# Patient Record
Sex: MALE | Race: WHITE | NOT HISPANIC OR LATINO | Employment: OTHER | URBAN - METROPOLITAN AREA
[De-identification: names, ages, dates, MRNs, and addresses within clinical notes are randomized per-mention and may not be internally consistent; named-entity substitution may affect disease eponyms.]

---

## 2017-02-09 DIAGNOSIS — E78.5 HYPERLIPIDEMIA ASSOCIATED WITH TYPE 2 DIABETES MELLITUS: ICD-10-CM

## 2017-02-09 DIAGNOSIS — E11.69 HYPERLIPIDEMIA ASSOCIATED WITH TYPE 2 DIABETES MELLITUS: ICD-10-CM

## 2017-02-09 RX ORDER — ATORVASTATIN CALCIUM 20 MG/1
TABLET, FILM COATED ORAL
Qty: 30 TABLET | Refills: 0 | Status: SHIPPED | OUTPATIENT
Start: 2017-02-09 | End: 2017-03-10 | Stop reason: SDUPTHER

## 2017-02-10 NOTE — TELEPHONE ENCOUNTER
Spoke with patient explained he needs to come in to see Dr. Rey before the month supply is out verbal understanding noted

## 2017-03-10 DIAGNOSIS — E11.69 HYPERLIPIDEMIA ASSOCIATED WITH TYPE 2 DIABETES MELLITUS: ICD-10-CM

## 2017-03-10 DIAGNOSIS — E78.5 HYPERLIPIDEMIA ASSOCIATED WITH TYPE 2 DIABETES MELLITUS: ICD-10-CM

## 2017-03-10 RX ORDER — ATORVASTATIN CALCIUM 20 MG/1
TABLET, FILM COATED ORAL
Qty: 30 TABLET | Refills: 2 | Status: SHIPPED | OUTPATIENT
Start: 2017-03-10 | End: 2017-07-14 | Stop reason: SDUPTHER

## 2017-03-27 DIAGNOSIS — L03.119 TYPE 2 DIABETES, CONTROLLED, WITH CELLULITIS OF FOOT: ICD-10-CM

## 2017-03-27 DIAGNOSIS — E11.628 TYPE 2 DIABETES, CONTROLLED, WITH CELLULITIS OF FOOT: ICD-10-CM

## 2017-03-27 RX ORDER — METFORMIN HYDROCHLORIDE 1000 MG/1
TABLET ORAL
Qty: 90 TABLET | Refills: 0 | Status: SHIPPED | OUTPATIENT
Start: 2017-03-27 | End: 2017-05-11 | Stop reason: SDUPTHER

## 2017-05-08 ENCOUNTER — APPOINTMENT (OUTPATIENT)
Dept: LAB | Facility: HOSPITAL | Age: 67
End: 2017-05-08
Attending: INTERNAL MEDICINE
Payer: MEDICARE

## 2017-05-08 ENCOUNTER — CLINICAL SUPPORT (OUTPATIENT)
Dept: FAMILY MEDICINE | Facility: CLINIC | Age: 67
End: 2017-05-08
Payer: MEDICARE

## 2017-05-08 DIAGNOSIS — E78.5 HYPERLIPIDEMIA ASSOCIATED WITH TYPE 2 DIABETES MELLITUS: ICD-10-CM

## 2017-05-08 DIAGNOSIS — E11.59 HYPERTENSION ASSOCIATED WITH DIABETES: ICD-10-CM

## 2017-05-08 DIAGNOSIS — E11.69 HYPERLIPIDEMIA ASSOCIATED WITH TYPE 2 DIABETES MELLITUS: ICD-10-CM

## 2017-05-08 DIAGNOSIS — I15.2 HYPERTENSION ASSOCIATED WITH DIABETES: ICD-10-CM

## 2017-05-08 DIAGNOSIS — E11.9 CONTROLLED TYPE 2 DIABETES MELLITUS WITHOUT COMPLICATION, WITHOUT LONG-TERM CURRENT USE OF INSULIN: ICD-10-CM

## 2017-05-08 LAB
ALBUMIN SERPL BCP-MCNC: 4.3 G/DL
ALP SERPL-CCNC: 43 U/L
ALT SERPL W/O P-5'-P-CCNC: 16 U/L
ANION GAP SERPL CALC-SCNC: 9 MMOL/L
AST SERPL-CCNC: 16 U/L
BASOPHILS # BLD AUTO: 0 K/UL
BASOPHILS NFR BLD: 0.4 %
BILIRUB SERPL-MCNC: 0.4 MG/DL
BUN SERPL-MCNC: 17 MG/DL
CALCIUM SERPL-MCNC: 10.2 MG/DL
CHLORIDE SERPL-SCNC: 101 MMOL/L
CHOLEST/HDLC SERPL: 3 {RATIO}
CO2 SERPL-SCNC: 26 MMOL/L
CREAT SERPL-MCNC: 0.8 MG/DL
DIFFERENTIAL METHOD: NORMAL
EOSINOPHIL # BLD AUTO: 0.2 K/UL
EOSINOPHIL NFR BLD: 2.3 %
ERYTHROCYTE [DISTWIDTH] IN BLOOD BY AUTOMATED COUNT: 13.7 %
EST. GFR  (AFRICAN AMERICAN): >60 ML/MIN/1.73 M^2
EST. GFR  (NON AFRICAN AMERICAN): >60 ML/MIN/1.73 M^2
GLUCOSE SERPL-MCNC: 97 MG/DL
HCT VFR BLD AUTO: 47.9 %
HDL/CHOLESTEROL RATIO: 33.1 %
HDLC SERPL-MCNC: 121 MG/DL
HDLC SERPL-MCNC: 40 MG/DL
HGB BLD-MCNC: 16 G/DL
LDLC SERPL CALC-MCNC: 59.6 MG/DL
LYMPHOCYTES # BLD AUTO: 1.8 K/UL
LYMPHOCYTES NFR BLD: 22.3 %
MCH RBC QN AUTO: 29.3 PG
MCHC RBC AUTO-ENTMCNC: 33.4 %
MCV RBC AUTO: 88 FL
MONOCYTES # BLD AUTO: 0.7 K/UL
MONOCYTES NFR BLD: 8.2 %
NEUTROPHILS # BLD AUTO: 5.4 K/UL
NEUTROPHILS NFR BLD: 66.8 %
NONHDLC SERPL-MCNC: 81 MG/DL
PLATELET # BLD AUTO: 175 K/UL
PMV BLD AUTO: 9.4 FL
POTASSIUM SERPL-SCNC: 4.7 MMOL/L
PROT SERPL-MCNC: 7.4 G/DL
RBC # BLD AUTO: 5.46 M/UL
SODIUM SERPL-SCNC: 136 MMOL/L
TRIGL SERPL-MCNC: 107 MG/DL
WBC # BLD AUTO: 8 K/UL

## 2017-05-08 PROCEDURE — 80061 LIPID PANEL: CPT

## 2017-05-08 PROCEDURE — 83036 HEMOGLOBIN GLYCOSYLATED A1C: CPT

## 2017-05-08 PROCEDURE — 80053 COMPREHEN METABOLIC PANEL: CPT

## 2017-05-08 PROCEDURE — 85025 COMPLETE CBC W/AUTO DIFF WBC: CPT

## 2017-05-09 LAB
ESTIMATED AVG GLUCOSE: 120 MG/DL
HBA1C MFR BLD HPLC: 5.8 %

## 2017-05-11 DIAGNOSIS — E11.628 TYPE 2 DIABETES, CONTROLLED, WITH CELLULITIS OF FOOT: ICD-10-CM

## 2017-05-11 DIAGNOSIS — L03.119 TYPE 2 DIABETES, CONTROLLED, WITH CELLULITIS OF FOOT: ICD-10-CM

## 2017-05-11 RX ORDER — METFORMIN HYDROCHLORIDE 1000 MG/1
TABLET ORAL
Qty: 90 TABLET | Refills: 0 | Status: SHIPPED | OUTPATIENT
Start: 2017-05-11 | End: 2017-07-22 | Stop reason: SDUPTHER

## 2017-05-15 ENCOUNTER — DOCUMENTATION ONLY (OUTPATIENT)
Dept: FAMILY MEDICINE | Facility: CLINIC | Age: 67
End: 2017-05-15

## 2017-05-15 ENCOUNTER — OFFICE VISIT (OUTPATIENT)
Dept: FAMILY MEDICINE | Facility: CLINIC | Age: 67
End: 2017-05-15
Payer: MEDICARE

## 2017-05-15 VITALS
HEART RATE: 69 BPM | DIASTOLIC BLOOD PRESSURE: 74 MMHG | SYSTOLIC BLOOD PRESSURE: 131 MMHG | TEMPERATURE: 99 F | HEIGHT: 71 IN | BODY MASS INDEX: 27.06 KG/M2 | OXYGEN SATURATION: 96 % | WEIGHT: 193.31 LBS

## 2017-05-15 DIAGNOSIS — I15.2 HYPERTENSION ASSOCIATED WITH DIABETES: ICD-10-CM

## 2017-05-15 DIAGNOSIS — E78.5 HYPERLIPIDEMIA ASSOCIATED WITH TYPE 2 DIABETES MELLITUS: ICD-10-CM

## 2017-05-15 DIAGNOSIS — E11.69 HYPERLIPIDEMIA ASSOCIATED WITH TYPE 2 DIABETES MELLITUS: ICD-10-CM

## 2017-05-15 DIAGNOSIS — M54.2 NECK PAIN: ICD-10-CM

## 2017-05-15 DIAGNOSIS — Z72.0 TOBACCO ABUSE: ICD-10-CM

## 2017-05-15 DIAGNOSIS — E11.59 HYPERTENSION ASSOCIATED WITH DIABETES: ICD-10-CM

## 2017-05-15 DIAGNOSIS — E11.9 CONTROLLED TYPE 2 DIABETES MELLITUS WITHOUT COMPLICATION, WITHOUT LONG-TERM CURRENT USE OF INSULIN: Primary | ICD-10-CM

## 2017-05-15 PROCEDURE — 3078F DIAST BP <80 MM HG: CPT | Mod: S$GLB,,, | Performed by: INTERNAL MEDICINE

## 2017-05-15 PROCEDURE — 1159F MED LIST DOCD IN RCRD: CPT | Mod: S$GLB,,, | Performed by: INTERNAL MEDICINE

## 2017-05-15 PROCEDURE — 1125F AMNT PAIN NOTED PAIN PRSNT: CPT | Mod: S$GLB,,, | Performed by: INTERNAL MEDICINE

## 2017-05-15 PROCEDURE — 3075F SYST BP GE 130 - 139MM HG: CPT | Mod: S$GLB,,, | Performed by: INTERNAL MEDICINE

## 2017-05-15 PROCEDURE — 1160F RVW MEDS BY RX/DR IN RCRD: CPT | Mod: S$GLB,,, | Performed by: INTERNAL MEDICINE

## 2017-05-15 PROCEDURE — 3044F HG A1C LEVEL LT 7.0%: CPT | Mod: S$GLB,,, | Performed by: INTERNAL MEDICINE

## 2017-05-15 PROCEDURE — 99499 UNLISTED E&M SERVICE: CPT | Mod: S$GLB,,, | Performed by: INTERNAL MEDICINE

## 2017-05-15 PROCEDURE — 3060F POS MICROALBUMINURIA REV: CPT | Mod: 8P,S$GLB,, | Performed by: INTERNAL MEDICINE

## 2017-05-15 PROCEDURE — 99214 OFFICE O/P EST MOD 30 MIN: CPT | Mod: S$GLB,,, | Performed by: INTERNAL MEDICINE

## 2017-05-15 NOTE — PROGRESS NOTES
Subjective:       Patient ID: Umang Goldberg is a 67 y.o. male.    Chief Complaint: Follow-up    HPI       CHIEF COMPLAINT: Diabetes.  HPI:     ONSET/TIMING:     QUALITY/COURSE:   unchanged..  Controlled: yes.     INTENSITY/SEVERITY: . Measures blood sugar :  3 times per wk .        Lowest recent . Average .     CONTEXT/WHEN: last HgbA1c    Lab Results   Component Value Date    HGBA1C 5.8 05/08/2017      weights:    Wt Readings from Last 1 Encounters:   05/15/17 1312 87.7 kg (193 lb 5.5 oz)         SYMPTOMS/RELATED: . . Possible medication side effects include:     The following symptoms/statements  are present IF IN BOLD, negative otherwise.         MODIFIERS/TREATMENTS: Not_taking_medications:  .  Non-compliance_with_Diabetic_diet  .  No_eye_exam_within_last_year.  Not_practicing_good_foot_care.    REVIEW OF SYMPTOMS: . Weight_gain. Weight_loss. Neuropathy. Recurrent_infections. Skin_ulcers          CHIEF COMPLAINT: Hyperlipidemia. cholesterol screening: no.   HPI:     ONSET:    MODIFIERS/TREATMENTS: . Taking medications: yes. . Non-compliance with following diet: no. .     SYMPTOMS/RELATED:Possible medication side effects include:   Myalgia: no.  .     REVIEW OF SYMPTOMS: past weights:   Wt Readings from Last 1 Encounters:   05/15/17 1312 87.7 kg (193 lb 5.5 oz)                                                     Last lipids: total   Lab Results   Component Value Date    CHOL 121 05/08/2017    CHOL 121 11/07/2016    CHOL 101 (L) 08/08/2016                                                                     HDL   Lab Results   Component Value Date    HDL 40 05/08/2017    HDL 38 (L) 11/07/2016    HDL 41 08/08/2016                                                                     LDL   Lab Results   Component Value Date    LDLCALC 59.6 (L) 05/08/2017    LDLCALC 61.8 (L) 11/07/2016    LDLCALC 51.0 (L) 08/08/2016                                                                     TRIG   Lab Results  "  Component Value Date    TRIG 107 05/08/2017    TRIG 106 11/07/2016    TRIG 45 08/08/2016                                                                           CHIEF COMPLAINT: Hypertension  HPI:     ONSET:      QUALITY/COURSE:   Controlled:  yes     INTENSITY/SEVERITY:  Average blood pressure is 130/80 .     MODIFIERS/TREATMENTS:  Taking medications: yes. .High sodium intake: no. alcohol: no      The following symptoms are positive only if BOLDED, otherwise are negative.      SYMPTOMS/RELATED: Possible medication side effects include:   Depression..  . Cough. . Constipation.    REVIEW OF SYMPTOMS: . Weight_loss . Weight_gain . Leg_cramps .Potency_problems .    TARGET ORGAN DAMAGE:: angina/ prior myocardial infarction, chronic kidney disease, heart failure, left ventricular hypertrophy, peripheral artery disease, prior coronary revascularization, retinopathy, stroke. transient ischemic attack.    Patient continues smoking pack a day.  He is willing to go to smoking cessation    Patient is learning have a nerve ablation done for his neck pain.  Was asking for referral to a neurosurgeon       Review of Systems   Constitutional: Negative for diaphoresis, fatigue and unexpected weight change.   Eyes: Negative for visual disturbance.   Respiratory: Negative for chest tightness and shortness of breath.    Cardiovascular: Negative for chest pain and leg swelling.   Endocrine: Negative for polyphagia and polyuria.   Neurological: Negative for dizziness, syncope, weakness, numbness and headaches.   Psychiatric/Behavioral: Negative for dysphoric mood. The patient is not nervous/anxious.        Objective:      Vitals:    05/15/17 1312   BP: 131/74   Pulse: 69   Temp: 98.6 °F (37 °C)   TempSrc: Oral   SpO2: 96%   Weight: 87.7 kg (193 lb 5.5 oz)   Height: 5' 11" (1.803 m)   PainSc:   4   PainLoc: Neck     Physical Exam   Constitutional: He appears well-developed and well-nourished.   Cardiovascular: Normal rate, regular " rhythm and normal heart sounds.    Pulses:       Dorsalis pedis pulses are 2+ on the right side, and 2+ on the left side.   Pulmonary/Chest: Effort normal and breath sounds normal. No respiratory distress. He has no wheezes.   Abdominal: Soft. Bowel sounds are normal. There is no tenderness.   Musculoskeletal:        Right foot: There is normal range of motion and no deformity.        Left foot: There is normal range of motion and no deformity.   Feet:   Right Foot:   Protective Sensation: 5 sites tested. 5 sites sensed.   Skin Integrity: Negative for ulcer, blister, skin breakdown, erythema, warmth, callus or dry skin.   Left Foot:   Protective Sensation: 5 sites tested. 5 sites sensed.   Skin Integrity: Negative for ulcer, blister, skin breakdown, erythema, warmth, callus or dry skin.         Assessment:       1. Controlled type 2 diabetes mellitus without complication, without long-term current use of insulin    2. Hypertension associated with diabetes    3. Hyperlipidemia associated with type 2 diabetes mellitus    4. Tobacco abuse    5. Neck pain          Plan:    discouraged the patient from going to a neurosurgeon suggested that he follow through with the pain management.    Controlled type 2 diabetes mellitus without complication, without long-term current use of insulin  -     CBC auto differential; Future; Expected date: 5/15/17  -     Hemoglobin A1c; Future; Expected date: 5/15/17  -     Lipid panel; Future; Expected date: 5/15/17    Hypertension associated with diabetes  -     Comprehensive metabolic panel; Future; Expected date: 5/15/17    Hyperlipidemia associated with type 2 diabetes mellitus  -     Microalbumin/creatinine urine ratio; Future; Expected date: 5/15/17    Tobacco abuse  -     Ambulatory referral to Smoking Cessation Program    Neck pain      Return in about 6 months (around 11/15/2017).

## 2017-05-15 NOTE — MR AVS SNAPSHOT
[facility]  [address] [address] [address]  Phone: [phone]  Fax: [phone]                  Umang Goldberg   5/15/2017 1:00 PM   Office Visit    Description:  Male : 1950   Provider:  Jc Rey MD   Department:  [facility]           Reason for Visit     Follow-up           Diagnoses this Visit        Comments    Controlled type 2 diabetes mellitus without complication, without long-term current use of insulin    -  Primary     Hypertension associated with diabetes         Hyperlipidemia associated with type 2 diabetes mellitus         Tobacco abuse         Neck pain                To Do List           Future Appointments        Provider Department Dept Phone    2017 8:10 AM LAB, [facility] [phone]    2017 8:20 AM LAB, [place] [phone]    2017 8:00 AM Jc Rey MD [facility] [phone]      Goals (5 Years of Data)     None      Follow-Up and Disposition     Return in about 6 months (around 11/15/2017).      [facility] On Call     [facility] On Call Nurse Care Line -  Assistance  Unless otherwise directed by your provider, please contact [facility] On-Call, our nurse care line that is available for  assistance.     Registered nurses in the [facility] On Call Center provide: appointment scheduling, clinical advisement, health education, and other advisory services.  Call: [phone] (toll free)               Medications           Message regarding Medications     Verify the changes and/or additions to your medication regime listed below are the same as discussed with your clinician today.  If any of these changes or additions are incorrect, please notify your healthcare provider.             Verify that the below list of medications is an accurate representation of the medications you are currently taking.  If none reported, the  "list may be blank. If incorrect, please contact your healthcare provider. Carry this list with you in case of emergency.           Current Medications     aspirin 81 MG Chew Take 1 tablet (81 mg total) by mouth once daily.    atorvastatin (LIPITOR) 20 MG tablet TAKE 1 TABLET (20 MG TOTAL) BY MOUTH ONCE DAILY.    baclofen (LIORESAL) 10 MG tablet Take 1 tablet (10 mg total) by mouth 3 (three) times daily as needed.    blood sugar diagnostic Strp 1 strip by Misc.(Non-Drug; Combo Route) route once daily.    blood-glucose meter kit Use as instructed    ELIQUIS 5 mg Tab Take 5 mg by mouth 2 (two) times daily.    hydrocodone-acetaminophen 10-325mg (NORCO)  mg Tab Take 1 tablet by mouth 2 (two) times daily.    lancets Misc 1 lancet by Misc.(Non-Drug; Combo Route) route 2 (two) times daily with meals.    lancing device Misc 1 Device by Misc.(Non-Drug; Combo Route) route 2 (two) times daily with meals.    metformin (GLUCOPHAGE) 1000 MG tablet TAKE 1 TABLET (1,000 MG TOTAL) BY MOUTH 2 (TWO) TIMES DAILY.    metoprolol succinate (TOPROL-XL) 25 MG 24 hr tablet Take 25 mg by mouth once daily.    propafenone (RHTHYMOL) 150 MG Tab Take 1 tablet by mouth 2 (two) times daily.    sildenafil (REVATIO) 20 mg Tab 3 by mouth daily as needed           Clinical Reference Information           Your Vitals Were     BP Pulse Temp Height Weight SpO2    131/74 (BP Location: Right arm, Patient Position: Sitting, BP Method: Automatic) 69 98.6 °F (37 °C) (Oral) 5' 11" (1.803 m) 87.7 kg (193 lb 5.5 oz) 96%    BMI                26.97 kg/m2          Blood Pressure          Most Recent Value    BP  131/74      Allergies as of 5/15/2017     Contrast Media      Immunizations Administered on Date of Encounter - 5/15/2017     None      Orders Placed During Today's Visit      Normal Orders This Visit    Ambulatory referral to Smoking Cessation Program     Future Labs/Procedures Expected by Expires    CBC auto differential  5/15/2017 5/16/2018    " Comprehensive metabolic panel  5/15/2017 5/16/2018    Hemoglobin A1c  5/15/2017 5/16/2018    Lipid panel  5/15/2017 5/16/2018    Microalbumin/creatinine urine ratio  5/15/2017 5/15/2018      Instructions    Avoid white carbohydrates.   Eat  a palm sized protein with each meal.       Walk for 10 minutes after you eat.  This will keep your sugars from going high at that time.    Stop smoking    For the neck, gets a smart temp cold pack or 2.  Get a Swiss ball and sit on it and slowly work and doing the exercises.       Smoking Cessation     If you would like to quit smoking:   You may be eligible for free services if you are a [place] resident and started smoking cigarettes before September 1, 1988.  Call the Smoking Cessation [employer] ([facility]) toll free at [phone] or ([phone].   Call 1-800-QUIT-NOW if you do not meet the above criteria.   Contact us via email: [email or web address]   View our website for more information: [email or web address]        Language Assistance Services     ATTENTION: Language assistance services are available, free of charge. Please call [phone].      ATENCIÓN: Si habla español, tiene a bingham disposición servicios gratuitos de asistencia lingüística. Llame al [phone].     CHÚ Ý: N?u b?n nói Ti?ng Vi?t, có các d?ch v? h? tr? ngôn ng? mi?n phí dành cho b?n. G?i s? [phone].         [facility] complies with applicable Federal civil rights laws and does not discriminate on the basis of race, color, national origin, age, disability, or sex.

## 2017-05-15 NOTE — PROGRESS NOTES
Health Maintenance Due   Topic Date Due    Colonoscopy  04/09/2001    Pneumococcal (65+) (1 of 2 - PCV13) 04/09/2016    Foot Exam  05/16/2017    Eye Exam  05/25/2017

## 2017-05-15 NOTE — PATIENT INSTRUCTIONS
Avoid white carbohydrates.   Eat  a palm sized protein with each meal.       Walk for 10 minutes after you eat.  This will keep your sugars from going high at that time.    Stop smoking    For the neck, gets a smart temp cold pack or 2.  Get a Swiss ball and sit on it and slowly work and doing the exercises.

## 2017-05-29 ENCOUNTER — CLINICAL SUPPORT (OUTPATIENT)
Dept: SMOKING CESSATION | Facility: CLINIC | Age: 67
End: 2017-05-29
Payer: COMMERCIAL

## 2017-05-29 DIAGNOSIS — F17.200 NICOTINE DEPENDENCE: Primary | ICD-10-CM

## 2017-05-29 PROCEDURE — 99404 PREV MED CNSL INDIV APPRX 60: CPT | Mod: S$GLB,,,

## 2017-05-29 PROCEDURE — 99999 PR PBB SHADOW E&M-EST. PATIENT-LVL II: CPT | Mod: PBBFAC,,,

## 2017-05-29 RX ORDER — IBUPROFEN 200 MG
1 TABLET ORAL DAILY
Qty: 14 PATCH | Refills: 0 | Status: SHIPPED | OUTPATIENT
Start: 2017-05-29 | End: 2017-10-10

## 2017-05-30 DIAGNOSIS — N52.9 ERECTILE DYSFUNCTION, UNSPECIFIED ERECTILE DYSFUNCTION TYPE: ICD-10-CM

## 2017-05-30 RX ORDER — SILDENAFIL CITRATE 20 MG/1
TABLET ORAL
Qty: 30 TABLET | Refills: 0 | Status: SHIPPED | OUTPATIENT
Start: 2017-05-30 | End: 2017-10-10

## 2017-05-31 DIAGNOSIS — N52.9 ERECTILE DYSFUNCTION, UNSPECIFIED ERECTILE DYSFUNCTION TYPE: ICD-10-CM

## 2017-05-31 RX ORDER — SILDENAFIL CITRATE 20 MG/1
TABLET ORAL
Qty: 30 TABLET | Refills: 0 | Status: SHIPPED | OUTPATIENT
Start: 2017-05-31 | End: 2017-10-10

## 2017-06-01 DIAGNOSIS — N52.9 ERECTILE DYSFUNCTION, UNSPECIFIED ERECTILE DYSFUNCTION TYPE: ICD-10-CM

## 2017-06-02 RX ORDER — SILDENAFIL CITRATE 20 MG/1
TABLET ORAL
Qty: 30 TABLET | Refills: 0 | Status: SHIPPED | OUTPATIENT
Start: 2017-06-02 | End: 2017-10-10

## 2017-07-05 ENCOUNTER — TELEPHONE (OUTPATIENT)
Dept: SMOKING CESSATION | Facility: CLINIC | Age: 67
End: 2017-07-05

## 2017-07-05 NOTE — TELEPHONE ENCOUNTER
I called patient today for tobacco cessation follow up, but I had to leave message. I did leave my name and contact number ([phone]) for a return call.

## 2017-07-12 ENCOUNTER — TELEPHONE (OUTPATIENT)
Dept: SMOKING CESSATION | Facility: CLINIC | Age: 67
End: 2017-07-12

## 2017-07-12 NOTE — TELEPHONE ENCOUNTER
I was returning a call to the patient for follow up. He had left me a voicemail stating that he was going to withdraw from the smoking cessation program at this time. He stated that he would call back to reschedule if he changed his mind in the future. I had to leave a message today and I let him know that his enrollment expiration date was 9/23/17 and offered for him to return to program should choose.

## 2017-07-14 DIAGNOSIS — E11.69 HYPERLIPIDEMIA ASSOCIATED WITH TYPE 2 DIABETES MELLITUS: ICD-10-CM

## 2017-07-14 DIAGNOSIS — E78.5 HYPERLIPIDEMIA ASSOCIATED WITH TYPE 2 DIABETES MELLITUS: ICD-10-CM

## 2017-07-14 RX ORDER — ATORVASTATIN CALCIUM 20 MG/1
20 TABLET, FILM COATED ORAL DAILY
Qty: 90 TABLET | Refills: 3 | Status: SHIPPED | OUTPATIENT
Start: 2017-07-14 | End: 2018-08-20 | Stop reason: SDUPTHER

## 2017-07-14 NOTE — TELEPHONE ENCOUNTER
----- Message from Vicenta Gallagher sent at 7/14/2017 11:11 AM CDT -----  Contact: Kristal FRIED  Patient need a refill on atorvastatin 90day supply please send to [facility], any questions please call back at [phone]    [facility]/pharmacy #[id number] - [place] - [address]  [address] [address]  Phone: [phone] Fax: [phone]

## 2017-07-22 DIAGNOSIS — E11.628 TYPE 2 DIABETES, CONTROLLED, WITH CELLULITIS OF FOOT: ICD-10-CM

## 2017-07-22 DIAGNOSIS — L03.119 TYPE 2 DIABETES, CONTROLLED, WITH CELLULITIS OF FOOT: ICD-10-CM

## 2017-07-24 RX ORDER — METFORMIN HYDROCHLORIDE 1000 MG/1
TABLET ORAL
Qty: 90 TABLET | Refills: 0 | Status: SHIPPED | OUTPATIENT
Start: 2017-07-24 | End: 2017-09-05 | Stop reason: SDUPTHER

## 2017-09-05 DIAGNOSIS — L03.119 TYPE 2 DIABETES, CONTROLLED, WITH CELLULITIS OF FOOT: ICD-10-CM

## 2017-09-05 DIAGNOSIS — E11.628 TYPE 2 DIABETES, CONTROLLED, WITH CELLULITIS OF FOOT: ICD-10-CM

## 2017-09-05 RX ORDER — METFORMIN HYDROCHLORIDE 1000 MG/1
TABLET ORAL
Qty: 90 TABLET | Refills: 0 | Status: SHIPPED | OUTPATIENT
Start: 2017-09-05 | End: 2017-10-18 | Stop reason: SDUPTHER

## 2017-09-07 DIAGNOSIS — Z12.11 COLON CANCER SCREENING: ICD-10-CM

## 2017-10-10 ENCOUNTER — TELEPHONE (OUTPATIENT)
Dept: FAMILY MEDICINE | Facility: CLINIC | Age: 67
End: 2017-10-10

## 2017-10-10 ENCOUNTER — OFFICE VISIT (OUTPATIENT)
Dept: FAMILY MEDICINE | Facility: CLINIC | Age: 67
End: 2017-10-10
Payer: MEDICARE

## 2017-10-10 ENCOUNTER — DOCUMENTATION ONLY (OUTPATIENT)
Dept: FAMILY MEDICINE | Facility: CLINIC | Age: 67
End: 2017-10-10

## 2017-10-10 VITALS
BODY MASS INDEX: 27.38 KG/M2 | WEIGHT: 195.56 LBS | HEART RATE: 71 BPM | OXYGEN SATURATION: 96 % | TEMPERATURE: 99 F | SYSTOLIC BLOOD PRESSURE: 123 MMHG | DIASTOLIC BLOOD PRESSURE: 79 MMHG | RESPIRATION RATE: 16 BRPM | HEIGHT: 71 IN

## 2017-10-10 DIAGNOSIS — C44.92 SQUAMOUS CELL CARCINOMA OF SKIN: ICD-10-CM

## 2017-10-10 DIAGNOSIS — C44.91 BASAL CELL CARCINOMA, UNSPECIFIED SITE: ICD-10-CM

## 2017-10-10 DIAGNOSIS — J20.9 ACUTE BRONCHITIS, UNSPECIFIED ORGANISM: Primary | ICD-10-CM

## 2017-10-10 DIAGNOSIS — E11.9 CONTROLLED TYPE 2 DIABETES MELLITUS WITHOUT COMPLICATION, WITHOUT LONG-TERM CURRENT USE OF INSULIN: ICD-10-CM

## 2017-10-10 DIAGNOSIS — N52.9 ERECTILE DYSFUNCTION, UNSPECIFIED ERECTILE DYSFUNCTION TYPE: ICD-10-CM

## 2017-10-10 PROCEDURE — 99213 OFFICE O/P EST LOW 20 MIN: CPT | Mod: S$GLB,,, | Performed by: INTERNAL MEDICINE

## 2017-10-10 PROCEDURE — 99499 UNLISTED E&M SERVICE: CPT | Mod: S$GLB,,, | Performed by: INTERNAL MEDICINE

## 2017-10-10 RX ORDER — SILDENAFIL CITRATE 20 MG/1
TABLET ORAL
Qty: 30 TABLET | Refills: 0 | Status: SHIPPED | OUTPATIENT
Start: 2017-10-10 | End: 2017-12-26 | Stop reason: SDUPTHER

## 2017-10-10 RX ORDER — BENZONATATE 100 MG/1
100 CAPSULE ORAL 3 TIMES DAILY PRN
Qty: 20 CAPSULE | Refills: 1 | Status: SHIPPED | OUTPATIENT
Start: 2017-10-10 | End: 2017-10-20

## 2017-10-10 RX ORDER — IPRATROPIUM BROMIDE 42 UG/1
2 SPRAY, METERED NASAL 4 TIMES DAILY
Qty: 15 ML | Refills: 0 | Status: SHIPPED | OUTPATIENT
Start: 2017-10-10 | End: 2017-11-05 | Stop reason: SDUPTHER

## 2017-10-10 NOTE — PROGRESS NOTES
Health Maintenance Due   Topic Date Due    Colonoscopy  04/09/2001    Zoster Vaccine  04/09/2011    Pneumococcal (65+) (1 of 2 - PCV13) 04/09/2016    Eye Exam  05/25/2017    Influenza Vaccine  08/01/2017    Urine Microalbumin  11/07/2017

## 2017-10-10 NOTE — PROGRESS NOTES
"Subjective:       Patient ID: Umang Goldberg is a 67 y.o. male.    Chief Complaint: Cough (mucus); Sore Throat; and Diarrhea    HPI  Review of Systems    Objective:      Vitals:    10/10/17 1447   BP: 123/79   Pulse: 71   Resp: 16   Temp: 98.6 °F (37 °C)   TempSrc: Oral   SpO2: 96%   Weight: 88.7 kg (195 lb 8.8 oz)   Height: 5' 11" (1.803 m)   PainSc: 0-No pain     Physical Exam      Assessment:       No diagnosis found.      Plan:     There are no diagnoses linked to this encounter.  No Follow-up on file.      "

## 2017-10-10 NOTE — PATIENT INSTRUCTIONS
Cool mist vaporizor.  Hot fluids. Hot tea with lemon   Check your sugars more frequently when you're sick    For best results take the Tessalon Perles with Robitussin-DM

## 2017-10-10 NOTE — PROGRESS NOTES
"Subjective:       Patient ID: Umang Goldberg is a 67 y.o. male.    Chief Complaint: Cough (mucus); Sore Throat; and Diarrhea    HPI         CHIEF COMPLAINT: Cough(+).  HPI: Only coughs when he lies flat    ONSET/TIMING: .  5 d   ago    DURATION:               Paroxysmal: no .    QUALITY/COURSE:. unchanged    INTENSITY/SEVERITY: Severity is #  2 (10 point scale)      The following symptoms/statements are positive if BOLD, negative otherwise.      CONTEXT/WHEN:  Tobacco_use. Smokers_in_home. Seasonal_pattern. Allergies/Hayfever. Sinusitis. Irritant_Exposure(smoke/dust/fumes). Exposure_to_others_with_similar_symptoms.        Similar_problems_in_past.   PAST TREATMENT OR EVALUATION:   previous PPD. Recent_previous_chest_x-ray. Recent_antibiotics.  Associated Symptoms:     sputum production: scant. copious. Hemoptysis.  Medical History: Past_pulmonary_infections.  Cardiovascular_disease.chronic_lung_disease.  tuberculosis. Asthma. AIDS. Gastroesophageal_reflux_disease .      Review of Systems   Constitutional: Negative for chills, diaphoresis, fever and unexpected weight change.   HENT: Positive for sore throat. Negative for rhinorrhea and sinus pressure.    Respiratory: Positive for cough. Negative for shortness of breath and wheezing.    Cardiovascular: Negative for chest pain.   Gastrointestinal: Positive for diarrhea (2x/d).   Musculoskeletal: Negative for myalgias.       Objective:      Vitals:    10/10/17 1447   BP: 123/79   Pulse: 71   Resp: 16   Temp: 98.6 °F (37 °C)   TempSrc: Oral   SpO2: 96%   Weight: 88.7 kg (195 lb 8.8 oz)   Height: 5' 11" (1.803 m)   PainSc: 0-No pain     Physical Exam   Constitutional: He appears well-developed and well-nourished.   HENT:   Right Ear: External ear normal.   Left Ear: External ear normal.   Mouth/Throat: Oropharynx is clear and moist. No oropharyngeal exudate.   Eyes: Pupils are equal, round, and reactive to light.   Cardiovascular: Normal rate, regular rhythm and normal " heart sounds.  Exam reveals no gallop.    No murmur heard.  Pulmonary/Chest: Effort normal and breath sounds normal. He has no wheezes. He has no rales. He exhibits no tenderness.   Abdominal: Soft. There is no tenderness.   Musculoskeletal: He exhibits no edema.   Lymphadenopathy:     He has no cervical adenopathy.   Skin:   4 mm hard scaly mass on the left scapular area.  3 mm pearly papule on the right temple   Vitals reviewed.        Assessment:       1. Acute bronchitis, unspecified organism    2. Controlled type 2 diabetes mellitus without complication, without long-term current use of insulin    3. Squamous cell carcinoma of skin    4. Basal cell carcinoma, unspecified site          Plan:     Acute bronchitis, unspecified organism  -     ipratropium (ATROVENT) 0.06 % nasal spray; 2 sprays by Nasal route 4 (four) times daily.  Dispense: 15 mL; Refill: 0  -     benzonatate (TESSALON) 100 MG capsule; Take 1 capsule (100 mg total) by mouth 3 (three) times daily as needed for Cough.  Dispense: 20 capsule; Refill: 1    Controlled type 2 diabetes mellitus without complication, without long-term current use of insulin    Squamous cell carcinoma of skin  -     Ambulatory Referral to Dermatology    Basal cell carcinoma, unspecified site  -     Ambulatory Referral to Dermatology      Return for if you are not better return in one week.

## 2017-10-10 NOTE — TELEPHONE ENCOUNTER
----- Message from Amy Huber sent at 10/10/2017  9:43 AM CDT -----  Contact: Farhadn  Patient is requesting antibiotics for a upper respiratory  infection  to [facility] below, contact patient at [phone] to advise.       [facility]/pharmacy #[id number] - [place] - [address]  [address] [address]  Phone: [phone] Fax: [phone]

## 2017-10-18 DIAGNOSIS — E11.628 TYPE 2 DIABETES, CONTROLLED, WITH CELLULITIS OF FOOT: ICD-10-CM

## 2017-10-18 DIAGNOSIS — L03.119 TYPE 2 DIABETES, CONTROLLED, WITH CELLULITIS OF FOOT: ICD-10-CM

## 2017-10-18 RX ORDER — METFORMIN HYDROCHLORIDE 1000 MG/1
TABLET ORAL
Qty: 90 TABLET | Refills: 0 | Status: SHIPPED | OUTPATIENT
Start: 2017-10-18 | End: 2017-11-25 | Stop reason: SDUPTHER

## 2017-10-23 ENCOUNTER — OFFICE VISIT (OUTPATIENT)
Dept: OPTOMETRY | Facility: CLINIC | Age: 67
End: 2017-10-23
Payer: MEDICARE

## 2017-10-23 DIAGNOSIS — H52.7 REFRACTIVE ERROR: ICD-10-CM

## 2017-10-23 DIAGNOSIS — Z96.1 PSEUDOPHAKIA: ICD-10-CM

## 2017-10-23 DIAGNOSIS — E11.9 DIABETES MELLITUS WITHOUT OPHTHALMIC MANIFESTATIONS: Primary | ICD-10-CM

## 2017-10-23 PROCEDURE — 92014 COMPRE OPH EXAM EST PT 1/>: CPT | Mod: S$GLB,,, | Performed by: OPTOMETRIST

## 2017-10-23 PROCEDURE — 99999 PR PBB SHADOW E&M-EST. PATIENT-LVL I: CPT | Mod: PBBFAC,,, | Performed by: OPTOMETRIST

## 2017-10-23 PROCEDURE — 99499 UNLISTED E&M SERVICE: CPT | Mod: S$PBB,,, | Performed by: OPTOMETRIST

## 2017-10-23 NOTE — PROGRESS NOTES
HPI     Presenting Complaint: Pt here today for yearly diabetic eye exam.     Hemoglobin A1C       Date                     Value               Ref Range             Status                05/08/2017               5.8                 4.5 - 6.2 %           Final                 11/07/2016               5.8                 4.5 - 6.2 %           Final                 08/08/2016               5.7                 4.5 - 6.2 %           Final              Pt states distance vision has been stable. Uses OTC readers for small   print     (+) Pt sates eyes are sensitive to light and night driving, Has to wear   sunglasses at night because lights from cars are to bright.     (+) Dry eyes    (-) headaches  (-) diplopia   (-) flashes / (+) hx of floaters both eyes       Last edited by Andrey Lucas, OD on 10/23/2017  2:50 PM. (History)            Assessment /Plan     For exam results, see Encounter Report.    Diabetes mellitus without ophthalmic manifestations    Pseudophakia    Refractive error      DM type 2 w/o ocular retinopathy OU. Discussed possible ocular affects of uncontrolled blood sugar with patient. Recommended continued strong blood sugar control and continued care with PCP. Monitor yearly.     S/p cataract extraction OU with good results. Pt happy with uncorrected distance vision OU, happy with near vision with OTC readers. Denies refraction. Return prn for spec Rx.     Pt sensitive to glare at night, states better with yellow tinted lenses. Return if no relief.       RTC in 1 year for comprehensive eye exam, or sooner prn.

## 2017-11-05 DIAGNOSIS — J20.9 ACUTE BRONCHITIS, UNSPECIFIED ORGANISM: ICD-10-CM

## 2017-11-06 ENCOUNTER — CLINICAL SUPPORT (OUTPATIENT)
Dept: FAMILY MEDICINE | Facility: CLINIC | Age: 67
End: 2017-11-06
Payer: MEDICARE

## 2017-11-06 DIAGNOSIS — E78.5 HYPERLIPIDEMIA ASSOCIATED WITH TYPE 2 DIABETES MELLITUS: ICD-10-CM

## 2017-11-06 DIAGNOSIS — E11.69 HYPERLIPIDEMIA ASSOCIATED WITH TYPE 2 DIABETES MELLITUS: ICD-10-CM

## 2017-11-06 DIAGNOSIS — E11.9 CONTROLLED TYPE 2 DIABETES MELLITUS WITHOUT COMPLICATION, WITHOUT LONG-TERM CURRENT USE OF INSULIN: ICD-10-CM

## 2017-11-06 DIAGNOSIS — E11.59 HYPERTENSION ASSOCIATED WITH DIABETES: ICD-10-CM

## 2017-11-06 DIAGNOSIS — I15.2 HYPERTENSION ASSOCIATED WITH DIABETES: ICD-10-CM

## 2017-11-06 LAB
ALBUMIN SERPL BCP-MCNC: 3.9 G/DL
ALP SERPL-CCNC: 51 U/L
ALT SERPL W/O P-5'-P-CCNC: 12 U/L
ANION GAP SERPL CALC-SCNC: 10 MMOL/L
AST SERPL-CCNC: 16 U/L
BASOPHILS # BLD AUTO: 0 K/UL
BASOPHILS NFR BLD: 0.6 %
BILIRUB SERPL-MCNC: 0.4 MG/DL
BUN SERPL-MCNC: 15 MG/DL
CALCIUM SERPL-MCNC: 9.4 MG/DL
CHLORIDE SERPL-SCNC: 101 MMOL/L
CHOLEST SERPL-MCNC: 114 MG/DL
CHOLEST/HDLC SERPL: 2.8 {RATIO}
CO2 SERPL-SCNC: 27 MMOL/L
CREAT SERPL-MCNC: 0.8 MG/DL
CREAT UR-MCNC: 63 MG/DL
DIFFERENTIAL METHOD: ABNORMAL
EOSINOPHIL # BLD AUTO: 0.2 K/UL
EOSINOPHIL NFR BLD: 3.1 %
ERYTHROCYTE [DISTWIDTH] IN BLOOD BY AUTOMATED COUNT: 13.4 %
EST. GFR  (AFRICAN AMERICAN): >60 ML/MIN/1.73 M^2
EST. GFR  (NON AFRICAN AMERICAN): >60 ML/MIN/1.73 M^2
ESTIMATED AVG GLUCOSE: 105 MG/DL
GLUCOSE SERPL-MCNC: 113 MG/DL
HBA1C MFR BLD HPLC: 5.3 %
HCT VFR BLD AUTO: 43.3 %
HDLC SERPL-MCNC: 41 MG/DL
HDLC SERPL: 36 %
HGB BLD-MCNC: 14.9 G/DL
LDLC SERPL CALC-MCNC: 49.4 MG/DL
LYMPHOCYTES # BLD AUTO: 1.7 K/UL
LYMPHOCYTES NFR BLD: 25 %
MCH RBC QN AUTO: 30.1 PG
MCHC RBC AUTO-ENTMCNC: 34.4 G/DL
MCV RBC AUTO: 87 FL
MICROALBUMIN UR DL<=1MG/L-MCNC: 9 UG/ML
MICROALBUMIN/CREATININE RATIO: 14.3 UG/MG
MONOCYTES # BLD AUTO: 0.7 K/UL
MONOCYTES NFR BLD: 9.5 %
NEUTROPHILS # BLD AUTO: 4.3 K/UL
NEUTROPHILS NFR BLD: 61.8 %
NONHDLC SERPL-MCNC: 73 MG/DL
PLATELET # BLD AUTO: 164 K/UL
PMV BLD AUTO: 8.9 FL
POTASSIUM SERPL-SCNC: 4.6 MMOL/L
PROT SERPL-MCNC: 6.9 G/DL
RBC # BLD AUTO: 4.95 M/UL
SODIUM SERPL-SCNC: 138 MMOL/L
TRIGL SERPL-MCNC: 118 MG/DL
WBC # BLD AUTO: 6.9 K/UL

## 2017-11-06 PROCEDURE — 80061 LIPID PANEL: CPT

## 2017-11-06 PROCEDURE — 82570 ASSAY OF URINE CREATININE: CPT

## 2017-11-06 PROCEDURE — 80053 COMPREHEN METABOLIC PANEL: CPT

## 2017-11-06 PROCEDURE — 83036 HEMOGLOBIN GLYCOSYLATED A1C: CPT

## 2017-11-06 PROCEDURE — 85025 COMPLETE CBC W/AUTO DIFF WBC: CPT

## 2017-11-06 RX ORDER — IPRATROPIUM BROMIDE 42 UG/1
2 SPRAY, METERED NASAL 4 TIMES DAILY
Qty: 15 ML | Refills: 3 | Status: SHIPPED | OUTPATIENT
Start: 2017-11-06 | End: 2022-11-04 | Stop reason: ALTCHOICE

## 2017-11-13 ENCOUNTER — OFFICE VISIT (OUTPATIENT)
Dept: FAMILY MEDICINE | Facility: CLINIC | Age: 67
End: 2017-11-13
Payer: MEDICARE

## 2017-11-13 VITALS
HEART RATE: 91 BPM | HEIGHT: 71 IN | WEIGHT: 197.06 LBS | OXYGEN SATURATION: 96 % | RESPIRATION RATE: 16 BRPM | DIASTOLIC BLOOD PRESSURE: 79 MMHG | TEMPERATURE: 98 F | BODY MASS INDEX: 27.59 KG/M2 | SYSTOLIC BLOOD PRESSURE: 129 MMHG

## 2017-11-13 DIAGNOSIS — R53.83 MALAISE AND FATIGUE: ICD-10-CM

## 2017-11-13 DIAGNOSIS — E11.9 CONTROLLED TYPE 2 DIABETES MELLITUS WITHOUT COMPLICATION, WITHOUT LONG-TERM CURRENT USE OF INSULIN: ICD-10-CM

## 2017-11-13 DIAGNOSIS — E78.5 HYPERLIPIDEMIA, UNSPECIFIED HYPERLIPIDEMIA TYPE: ICD-10-CM

## 2017-11-13 DIAGNOSIS — F32.9 REACTIVE DEPRESSION: Primary | ICD-10-CM

## 2017-11-13 DIAGNOSIS — Z23 NEEDS FLU SHOT: ICD-10-CM

## 2017-11-13 DIAGNOSIS — R53.81 MALAISE AND FATIGUE: ICD-10-CM

## 2017-11-13 PROCEDURE — 90662 IIV NO PRSV INCREASED AG IM: CPT | Mod: S$GLB,,, | Performed by: INTERNAL MEDICINE

## 2017-11-13 PROCEDURE — 99214 OFFICE O/P EST MOD 30 MIN: CPT | Mod: S$GLB,,, | Performed by: INTERNAL MEDICINE

## 2017-11-13 PROCEDURE — G0008 ADMIN INFLUENZA VIRUS VAC: HCPCS | Mod: S$GLB,,, | Performed by: INTERNAL MEDICINE

## 2017-11-13 PROCEDURE — 99499 UNLISTED E&M SERVICE: CPT | Mod: S$GLB,,, | Performed by: INTERNAL MEDICINE

## 2017-11-13 RX ORDER — FLUOXETINE HYDROCHLORIDE 20 MG/1
20 CAPSULE ORAL DAILY
Qty: 30 CAPSULE | Refills: 11 | Status: SHIPPED | OUTPATIENT
Start: 2017-11-13 | End: 2018-12-21

## 2017-11-13 NOTE — PROGRESS NOTES
Subjective:       Patient ID: Umang Goldberg is a 67 y.o. male.    Chief Complaint: Diabetes (labs) and Fatigue    HPI         CHIEF COMPLAINT: Fatigue(+).  HPI: grieving his mother.     ONSET/TIMING: Onset    2 wks  ago. Sudden: .no . .Similar problems in past? yes     DURATION:       QUALITY/COURSE:  worse    INTENSITY/SEVERITY:.Severity is #      5  (10 point scale)    SYMPTOMS/RELATED:  Possible medication side effects include:     The following symptoms/statements are positive if BOLD, negative otherwise.        CONTEXT/WHEN:.--Fatigued_after_good_night's_rest. Worse_in_evenings.  Worse_after_taking_a_medication.. Similar_problems.    Exposure_to_others_with_similar_symptoms.    MODIFIERS/TREATMENTS:    Exercise.    meds:     REVIEW OF SYMPTOMS:  Anorexia. Depression. Stress. Fever. Headache. Neck_Pain. Lymphadenopathy. Sore_Throat. Rhinorrhea. Coryza. Cough. Chest_Pain. Abdomen_Pain. Nausea. Diarrhea. Urinary_Problems. Rash. Tick_Bites. Weight-gain.  Weight-loss.   Arthralgias . Loss-of-concentration. Loss-of-interests. Disordered-sleep. Cold-intolerance.  Heat-intolerance.  polyuria. polydipsia.          CHIEF COMPLAINT: Diabetes.  HPI:     ONSET/TIMING:     QUALITY/COURSE:   unchanged..      INTENSITY/SEVERITY: . Measures blood sugar :1times per wk.        Lowest recent BS 88. Average .     CONTEXT/WHEN: last HgbA1c    Lab Results   Component Value Date    HGBA1C 5.3 11/06/2017      weights:    Wt Readings from Last 1 Encounters:   11/13/17 0810 89.4 kg (197 lb 1.5 oz)         SYMPTOMS/RELATED: . . Possible medication side effects include:     The following symptoms/statements  are present IF IN BOLD, negative otherwise.         MODIFIERS/TREATMENTS: Not_taking_medications:  .  Non-compliance_with_Diabetic_diet  .  No_eye_exam_within_last_year.  Not_practicing_good_foot_care.    REVIEW OF SYMPTOMS: . Weight_gain. Weight_loss. Neuropathy. Recurrent_infections. Skin_ulcers      CHIEF COMPLAINT:  "Hyperlipidemia. cholesterol screening: no.   HPI:     ONSET:    MODIFIERS/TREATMENTS: . Taking medications: yes. . Non-compliance with following diet: no. .     SYMPTOMS/RELATED:Possible medication side effects include:   Myalgia: no.  .     REVIEW OF SYMPTOMS: past weights:   Wt Readings from Last 1 Encounters:   11/13/17 0810 89.4 kg (197 lb 1.5 oz)                                                     Last lipids: total   Lab Results   Component Value Date    CHOL 114 (L) 11/06/2017    CHOL 121 05/08/2017    CHOL 121 11/07/2016                                                                     HDL   Lab Results   Component Value Date    HDL 41 11/06/2017    HDL 40 05/08/2017    HDL 38 (L) 11/07/2016                                                                     LDL   Lab Results   Component Value Date    LDLCALC 49.4 (L) 11/06/2017    LDLCALC 59.6 (L) 05/08/2017    LDLCALC 61.8 (L) 11/07/2016                                                                     TRIG   Lab Results   Component Value Date    TRIG 118 11/06/2017    TRIG 107 05/08/2017    TRIG 106 11/07/2016                                                                         Review of Systems   Constitutional: Negative for diaphoresis, fatigue and unexpected weight change.   Eyes: Negative for visual disturbance.   Respiratory: Negative for chest tightness and shortness of breath.    Cardiovascular: Negative for chest pain and leg swelling.   Gastrointestinal: Positive for diarrhea.   Endocrine: Negative for polyphagia and polyuria.   Neurological: Negative for dizziness, syncope, weakness, numbness and headaches.   Psychiatric/Behavioral: Negative for dysphoric mood. The patient is not nervous/anxious.        Objective:      Vitals:    11/13/17 0810   BP: 129/79   Pulse: 91   Resp: 16   Temp: 98.2 °F (36.8 °C)   TempSrc: Oral   SpO2: 96%   Weight: 89.4 kg (197 lb 1.5 oz)   Height: 5' 11" (1.803 m)   PainSc: 0-No pain     Physical Exam "   Constitutional: He appears well-developed and well-nourished.   Eyes: Pupils are equal, round, and reactive to light.   Cardiovascular: Normal rate, regular rhythm and normal heart sounds.    Pulmonary/Chest: Effort normal. He has wheezes (Right lung only).   Abdominal: Soft. There is no tenderness.   Neurological: He is alert.   Psychiatric: He has a normal mood and affect. His behavior is normal. Thought content normal.   Nursing note and vitals reviewed.        Assessment:       1. Reactive depression    2. Controlled type 2 diabetes mellitus without complication, without long-term current use of insulin    3. Hyperlipidemia, unspecified hyperlipidemia type    4. Malaise and fatigue          Plan:     Reactive depression  -     FLUoxetine (PROZAC) 20 MG capsule; Take 1 capsule (20 mg total) by mouth once daily.  Dispense: 30 capsule; Refill: 11    Controlled type 2 diabetes mellitus without complication, without long-term current use of insulin    Hyperlipidemia, unspecified hyperlipidemia type    Malaise and fatigue  -     TSH; Future; Expected date: 11/13/2017  -     X-Ray Chest PA And Lateral; Future; Expected date: 11/13/2017  -     US Abdomen Complete; Future; Expected date: 11/13/2017      Return in about 6 weeks (around 12/25/2017).

## 2017-11-13 NOTE — PATIENT INSTRUCTIONS
Exercise regularly will help your  Depression.    Counseling from friends or your /.      Avoid white carbohydrates.   Eat  a palm sized protein with each meal.       Walk for 10 minutes after you eat.  This will keep your sugars from going high at that time.    Call us if you have any suicidal thoughts

## 2017-11-14 ENCOUNTER — HOSPITAL ENCOUNTER (OUTPATIENT)
Dept: RADIOLOGY | Facility: CLINIC | Age: 67
Discharge: HOME | End: 2017-11-14
Attending: INTERNAL MEDICINE
Payer: MEDICARE

## 2017-11-14 DIAGNOSIS — R53.83 MALAISE AND FATIGUE: ICD-10-CM

## 2017-11-14 DIAGNOSIS — R53.81 MALAISE AND FATIGUE: ICD-10-CM

## 2017-11-14 PROCEDURE — 71020 XR CHEST PA AND LATERAL: CPT | Mod: 26,,, | Performed by: RADIOLOGY

## 2017-11-14 PROCEDURE — 76700 US EXAM ABDOM COMPLETE: CPT | Mod: 26,,, | Performed by: RADIOLOGY

## 2017-11-14 PROCEDURE — 76700 US EXAM ABDOM COMPLETE: CPT | Mod: TC,PO

## 2017-11-14 PROCEDURE — 71020 XR CHEST PA AND LATERAL: CPT | Mod: TC,PO

## 2017-11-17 ENCOUNTER — LAB VISIT (OUTPATIENT)
Dept: LAB | Facility: HOSPITAL | Age: 67
End: 2017-11-17
Attending: INTERNAL MEDICINE
Payer: MEDICARE

## 2017-11-17 DIAGNOSIS — Z12.11 COLON CANCER SCREENING: ICD-10-CM

## 2017-11-17 PROCEDURE — 82274 ASSAY TEST FOR BLOOD FECAL: CPT

## 2017-11-20 LAB — HEMOCCULT STL QL IA: NEGATIVE

## 2017-11-25 DIAGNOSIS — E11.628 TYPE 2 DIABETES, CONTROLLED, WITH CELLULITIS OF FOOT: ICD-10-CM

## 2017-11-25 DIAGNOSIS — L03.119 TYPE 2 DIABETES, CONTROLLED, WITH CELLULITIS OF FOOT: ICD-10-CM

## 2017-11-27 RX ORDER — METFORMIN HYDROCHLORIDE 1000 MG/1
TABLET ORAL
Qty: 90 TABLET | Refills: 0 | Status: SHIPPED | OUTPATIENT
Start: 2017-11-27 | End: 2017-12-28 | Stop reason: SDUPTHER

## 2017-11-30 ENCOUNTER — TELEPHONE (OUTPATIENT)
Dept: FAMILY MEDICINE | Facility: CLINIC | Age: 67
End: 2017-11-30

## 2017-11-30 NOTE — TELEPHONE ENCOUNTER
----- Message from Jaleesa Cavazos sent at 11/30/2017  9:38 AM CST -----  Contact: self  Missed a call from office   Please call back [phone] ([email or web address])

## 2017-12-14 ENCOUNTER — TELEPHONE (OUTPATIENT)
Dept: FAMILY MEDICINE | Facility: CLINIC | Age: 67
End: 2017-12-14

## 2017-12-14 NOTE — TELEPHONE ENCOUNTER
----- Message from Foreign Abbasi sent at 12/14/2017 10:24 AM CST -----  Contact: same  Patient called in and stated he thinks he missed a call from office but not sure why they were calling??  Patient call back number is [phone]

## 2017-12-15 ENCOUNTER — INITIAL CONSULT (OUTPATIENT)
Dept: DERMATOLOGY | Facility: CLINIC | Age: 67
End: 2017-12-15
Payer: MEDICARE

## 2017-12-15 VITALS — BODY MASS INDEX: 27.58 KG/M2 | WEIGHT: 197 LBS | HEIGHT: 71 IN

## 2017-12-15 DIAGNOSIS — L72.0 MILIA: ICD-10-CM

## 2017-12-15 DIAGNOSIS — D48.9 NEOPLASM OF UNCERTAIN BEHAVIOR: Primary | ICD-10-CM

## 2017-12-15 DIAGNOSIS — L57.0 ACTINIC KERATOSES: ICD-10-CM

## 2017-12-15 DIAGNOSIS — L82.1 SEBORRHEIC KERATOSES: ICD-10-CM

## 2017-12-15 DIAGNOSIS — L81.4 SOLAR LENTIGO: ICD-10-CM

## 2017-12-15 PROCEDURE — 99202 OFFICE O/P NEW SF 15 MIN: CPT | Mod: 25,S$GLB,, | Performed by: DERMATOLOGY

## 2017-12-15 PROCEDURE — 17003 DESTRUCT PREMALG LES 2-14: CPT | Mod: S$GLB,,, | Performed by: DERMATOLOGY

## 2017-12-15 PROCEDURE — 88305 TISSUE EXAM BY PATHOLOGIST: CPT | Performed by: PATHOLOGY

## 2017-12-15 PROCEDURE — 17000 DESTRUCT PREMALG LESION: CPT | Mod: S$GLB,,, | Performed by: DERMATOLOGY

## 2017-12-15 PROCEDURE — 11100 PR BIOPSY OF SKIN LESION: CPT | Mod: 59,S$GLB,, | Performed by: DERMATOLOGY

## 2017-12-15 PROCEDURE — 99999 PR PBB SHADOW E&M-EST. PATIENT-LVL III: CPT | Mod: PBBFAC,,, | Performed by: DERMATOLOGY

## 2017-12-15 NOTE — LETTER
December 15, 2017      Jc Rey MD  [address]  [place] [address]           [place] [facility]  [address]  [place] [address]  Phone: [phone]          Patient: Umang Goldberg   MR Number: [id number]   YOB: 1950   Date of Visit: 12/15/2017       Dear Dr. Jc Rey:    Thank you for referring Umang Goldberg to me for evaluation. Attached you will find relevant portions of my assessment and plan of care.    If you have questions, please do not hesitate to call me. I look forward to following Umang Goldberg along with you.    Sincerely,    Africa Contreras MD    Enclosure  CC:  No Recipients    If you would like to receive this communication electronically, please contact [email or web address] or [phone] to request more information on [employer] Link access.    For providers and/or their staff who would like to refer a patient to [facility], please contact us through our one-stop-shop provider referral line, [facility], at [phone].    If you feel you have received this communication in error or would no longer like to receive these types of communications, please e-mail [email or web address]

## 2017-12-15 NOTE — PROGRESS NOTES
Subjective:       Patient ID:  Umang Goldberg is a 67 y.o. male who presents for   Chief Complaint   Patient presents with    Spot     Back     Lesion     R temple     Initial visit  No personal h/o skin cancer  + FH psoriasis  Outdoor employment , now retired        Spot  - Initial  Affected locations: back  Duration: 3 months  Signs and Symptoms: sore, raised.  Severity: mild  Timing: constant  Aggravated by: nothing  Relieving factors/Treatments tried: nothing    Lesion  - Initial  Affected locations: R temple.  Duration: years.  Signs / symptoms: growing (raised)  Severity: mild  Timing: constant  Aggravated by: nothing        Review of Systems   Constitutional: Negative for fever, chills, weight loss, weight gain, fatigue, night sweats and malaise.   Skin: Positive for wears hat. Negative for daily sunscreen use and activity-related sunscreen use.   Hematologic/Lymphatic: Does not bruise/bleed easily.        Objective:    Physical Exam   Constitutional: He appears well-developed and well-nourished. No distress.   Neurological: He is alert and oriented to person, place, and time. He is not disoriented.   Psychiatric: He has a normal mood and affect.   Skin:   Areas Examined (abnormalities noted in diagram):   Scalp / Hair Palpated and Inspected  Head / Face Inspection Performed  Neck Inspection Performed  Chest / Axilla Inspection Performed  Back Inspection Performed  RUE Inspected  LUE Inspection Performed                   Diagram Legend     Erythematous scaling macule/papule c/w actinic keratosis       Vascular papule c/w angioma      Pigmented verrucoid papule/plaque c/w seborrheic keratosis      Yellow umbilicated papule c/w sebaceous hyperplasia      Irregularly shaped tan macule c/w lentigo     1-2 mm smooth white papules consistent with Milia      Movable subcutaneous cyst with punctum c/w epidermal inclusion cyst      Subcutaneous movable cyst c/w pilar cyst      Firm pink to brown papule c/w  dermatofibroma      Pedunculated fleshy papule(s) c/w skin tag(s)      Evenly pigmented macule c/w junctional nevus     Mildly variegated pigmented, slightly irregular-bordered macule c/w mildly atypical nevus      Flesh colored to evenly pigmented papule c/w intradermal nevus       Pink pearly papule/plaque c/w basal cell carcinoma      Erythematous hyperkeratotic cursted plaque c/w SCC      Surgical scar with no sign of skin cancer recurrence      Open and closed comedones      Inflammatory papules and pustules      Verrucoid papule consistent consistent with wart     Erythematous eczematous patches and plaques     Dystrophic onycholytic nail with subungual debris c/w onychomycosis     Umbilicated papule    Erythematous-base heme-crusted tan verrucoid plaque consistent with inflamed seborrheic keratosis     Erythematous Silvery Scaling Plaque c/w Psoriasis     See annotation          Assessment / Plan:      Pathology Orders:     Normal Orders This Visit    Tissue Specimen To Pathology, Dermatology     Questions:    Directional Terms:  Other(comment)    Clinical information:  Cutaneous horn overlyingl pink scaly papule, r/o SCC    Specific Site:  left upper back        Neoplasm of uncertain behavior  -     Tissue Specimen To Pathology, Dermatology  Shave biopsy procedure note:    Shave biopsy performed after verbal consent including risk of infection, scar, recurrence, need for additional treatment of site. Area prepped with alcohol, anesthetized with approximately 1.0cc of 1% lidocaine with epinephrine. Lesional tissue shaved with razor blade. Hemostasis achieved with application of aluminum chloride followed by hyfrecation. No complications. Dressing applied. Wound care explained.    Actinic keratoses  Cryosurgery Procedure Note    Verbal consent from the patient is obtained and the patient is aware of the precancerous quality and need for treatment of these lesions. Liquid nitrogen cryosurgery is applied to the  2 actinic keratoses, as detailed in the physical exam, to produce a freeze injury. The patient is aware that blisters may form and is instructed on wound care with gentle cleansing and use of vaseline ointment to keep moist until healed. The patient is supplied a handout on cryosurgery and is instructed to call if lesions do not completely resolve.    Seborrheic keratoses  These are benign inherited growths without a malignant potential. Reassurance given to patient. No treatment is necessary.     Solar lentigo  This is a benign hyperpigmented sun induced lesion. Daily sun protection will reduce the number of new lesions. Treatment of these benign lesions are considered cosmetic.    Milia/superficial EIC, R temple  Reassurance    Patient instructed in importance in daily sun protection of at least spf 30. Sun avoidance and topical protection discussed.   Recommend Elta MD (physician office) COTZ sensitive (available online) for daily use on face and neck.  Patient encouraged to wear hat for all outdoor exposure.   Also discussed sun protective clothing.             Return in about 1 year (around 12/15/2018), or if symptoms worsen or fail to improve.

## 2017-12-15 NOTE — PATIENT INSTRUCTIONS
Shave Biopsy Wound Care    Your doctor has performed a shave biopsy today.  A band aid and vaseline ointment has been placed over the site.  This should remain in place for 24 hours.  It is recommended that you keep the area dry for the first 24 hours.  After 24 hours, you may remove the band aid and wash the area with warm soap and water and apply Vaseline jelly.  Many patients prefer to use Neosporin or Bacitracin ointment.  This is acceptable; however, know that you can develop an allergy to this medication even if you have used it safely for years.  It is important to keep the area moist.  Letting it dry out and get air slows healing time, and will worsen the scar.  Band aid is optional after first 24 hours.      If you notice increasing redness, tenderness, pain, or yellow drainage at the biopsy site, please notify your doctor.  These are signs of an infection.    If your biopsy site is bleeding, apply firm pressure for 15 minutes straight.  Repeat for another 15 minutes, if it is still bleeding.   If the surgical site continues to bleed, then please contact your doctor.       [facility]  [place] [facility]  [address] [address]  Dept: [phone]

## 2017-12-22 ENCOUNTER — DOCUMENTATION ONLY (OUTPATIENT)
Dept: FAMILY MEDICINE | Facility: CLINIC | Age: 67
End: 2017-12-22

## 2017-12-22 NOTE — PROGRESS NOTES
Health Maintenance Due   Topic Date Due    Zoster Vaccine  04/09/2011    Pneumococcal (65+) (1 of 2 - PCV13) 04/09/2016

## 2017-12-26 ENCOUNTER — OFFICE VISIT (OUTPATIENT)
Dept: FAMILY MEDICINE | Facility: CLINIC | Age: 67
End: 2017-12-26
Payer: MEDICARE

## 2017-12-26 VITALS
WEIGHT: 199.06 LBS | OXYGEN SATURATION: 94 % | SYSTOLIC BLOOD PRESSURE: 123 MMHG | HEART RATE: 72 BPM | DIASTOLIC BLOOD PRESSURE: 79 MMHG | HEIGHT: 71 IN | BODY MASS INDEX: 27.87 KG/M2 | TEMPERATURE: 99 F | RESPIRATION RATE: 16 BRPM

## 2017-12-26 DIAGNOSIS — E11.9 CONTROLLED TYPE 2 DIABETES MELLITUS WITHOUT COMPLICATION, WITHOUT LONG-TERM CURRENT USE OF INSULIN: ICD-10-CM

## 2017-12-26 DIAGNOSIS — I48.0 PAROXYSMAL ATRIAL FIBRILLATION: ICD-10-CM

## 2017-12-26 DIAGNOSIS — N52.9 ERECTILE DYSFUNCTION, UNSPECIFIED ERECTILE DYSFUNCTION TYPE: ICD-10-CM

## 2017-12-26 DIAGNOSIS — Z72.0 TOBACCO ABUSE: ICD-10-CM

## 2017-12-26 DIAGNOSIS — F41.8 ANXIOUS DEPRESSION: Primary | ICD-10-CM

## 2017-12-26 PROCEDURE — 99499 UNLISTED E&M SERVICE: CPT | Mod: S$GLB,,, | Performed by: INTERNAL MEDICINE

## 2017-12-26 PROCEDURE — 99214 OFFICE O/P EST MOD 30 MIN: CPT | Mod: S$GLB,,, | Performed by: INTERNAL MEDICINE

## 2017-12-26 RX ORDER — SILDENAFIL CITRATE 20 MG/1
TABLET ORAL
Qty: 30 TABLET | Refills: 11 | Status: SHIPPED | OUTPATIENT
Start: 2017-12-26 | End: 2018-12-21 | Stop reason: SDUPTHER

## 2017-12-26 RX ORDER — SILDENAFIL CITRATE 20 MG/1
TABLET ORAL
Qty: 30 TABLET | Refills: 11 | Status: SHIPPED | OUTPATIENT
Start: 2017-12-26 | End: 2017-12-26 | Stop reason: SDUPTHER

## 2017-12-26 NOTE — PATIENT INSTRUCTIONS
Await the right spot the sildenafil check the right spot with a sildenafil use for everything I dysthymia just in place

## 2017-12-26 NOTE — PROGRESS NOTES
"Subjective:       Patient ID: Uamng Goldberg is a 67 y.o. male.    Chief Complaint: Follow-up (labs and test) and Fatigue    HPI         CHIEF COMPLAINT: Depression: yes.  . Anxiety: yes.   HPI:     ONSET/TIMIN mo  ago.  Similar problems in past: .yes.   . # of episodes  of panic per week      0. .    DURATION:  constant    QUALITY/COURSE: better    INTENSITY/SEVERITY: .Severity is # 2 (10 point scale)    CONTEXT/WHEN:  Postpartum: no..  Personal loss: mother . ..  Stress: yes..    MODIFIERS/TREATMENTS: . Taking medications: no.  Compliance with taking prescribed medications: no.  alcohol abuse: no ...  Drug abuse: no .  Chronic disease: no.      The following symptoms are positive if BOLD, negative otherwise.        SYMPTOMS/RELATED: Possible medication side effects include:  dry mouth, decreased libido, impotence, GI disturbance, headache, insomnia, weight gain and weight loss.    REVIEW OF SYSTEMS: Sadness . Weakness . Fatigue . Lack_of _enjoyment_in_life . Sleep_disturbances . Anorexia .  Increased_appetite .  Irritability . Crying_spells .  Guilt .  Lost_concentration . Suicidal_ideation .   During panic attacks:  Chest_pain  . Shortness_of_breath  . dizziness . tingling . palpitations .    Patient has atrial fibrillation is well controlled.  He wishes to switch to an [facility] cardiologist.    Patient says sildenafil is working well for his erectile dysfunction.    Patient is tried to smoking cessation program and failed.  He complained of too much irritability.  He's not ready to quit smoking now.  Review of Systems    Objective:      Vitals:    17 0805   BP: 123/79   Pulse: 72   Resp: 16   Temp: 98.6 °F (37 °C)   TempSrc: Oral   SpO2: (!) 94%   Weight: 90.3 kg (199 lb 1.2 oz)   Height: 5' 11" (1.803 m)   PainSc: 0-No pain     Physical Exam   Constitutional: He appears well-developed and well-nourished.   Eyes: Pupils are equal, round, and reactive to light.   Cardiovascular: Normal rate, regular " rhythm and normal heart sounds.    Pulmonary/Chest: Effort normal and breath sounds normal.   Abdominal: Soft. There is no tenderness.   Neurological: He is alert.   Psychiatric: He has a normal mood and affect. His behavior is normal. Thought content normal.   Nursing note and vitals reviewed.        Assessment:       1. Anxious depression    2. Paroxysmal atrial fibrillation    3. Erectile dysfunction, unspecified erectile dysfunction type    4. Tobacco abuse    5. Controlled type 2 diabetes mellitus without complication, without long-term current use of insulin          Plan:     Anxious depression    Paroxysmal atrial fibrillation  -     Ambulatory Referral to Cardiology    Erectile dysfunction, unspecified erectile dysfunction type  -     Discontinue: sildenafil, antihypertensive, (REVATIO) 20 mg Tab; TAKE THREE TABLETS BY MOUTH ONCE DAILY AS NEEDED  Dispense: 30 tablet; Refill: 11  -     sildenafil, antihypertensive, (REVATIO) 20 mg Tab; TAKE THREE TABLETS BY MOUTH ONCE DAILY AS NEEDED  Dispense: 30 tablet; Refill: 11    Tobacco abuse    Controlled type 2 diabetes mellitus without complication, without long-term current use of insulin  -     Comprehensive metabolic panel; Future; Expected date: 12/26/2017  -     Hemoglobin A1c; Future; Expected date: 12/26/2017  -     Lipid panel; Future; Expected date: 12/26/2017  -     Microalbumin/creatinine urine ratio; Future      Return in about 3 months (around 3/26/2018).

## 2017-12-28 DIAGNOSIS — E11.628 TYPE 2 DIABETES, CONTROLLED, WITH CELLULITIS OF FOOT: ICD-10-CM

## 2017-12-28 DIAGNOSIS — L03.119 TYPE 2 DIABETES, CONTROLLED, WITH CELLULITIS OF FOOT: ICD-10-CM

## 2017-12-28 RX ORDER — METFORMIN HYDROCHLORIDE 1000 MG/1
TABLET ORAL
Qty: 90 TABLET | Refills: 0 | Status: SHIPPED | OUTPATIENT
Start: 2017-12-28 | End: 2018-04-04 | Stop reason: SDUPTHER

## 2018-02-06 ENCOUNTER — OFFICE VISIT (OUTPATIENT)
Dept: CARDIOLOGY | Facility: CLINIC | Age: 68
End: 2018-02-06
Payer: MEDICARE

## 2018-02-06 VITALS
OXYGEN SATURATION: 95 % | DIASTOLIC BLOOD PRESSURE: 74 MMHG | BODY MASS INDEX: 27.78 KG/M2 | WEIGHT: 198.44 LBS | HEART RATE: 67 BPM | HEIGHT: 71 IN | SYSTOLIC BLOOD PRESSURE: 150 MMHG

## 2018-02-06 DIAGNOSIS — I48.0 PAROXYSMAL ATRIAL FIBRILLATION: ICD-10-CM

## 2018-02-06 DIAGNOSIS — I48.0 PAROXYSMAL ATRIAL FIBRILLATION: Primary | ICD-10-CM

## 2018-02-06 DIAGNOSIS — Z72.0 TOBACCO ABUSE: Primary | ICD-10-CM

## 2018-02-06 PROCEDURE — 93000 ELECTROCARDIOGRAM COMPLETE: CPT | Mod: S$GLB,,, | Performed by: INTERNAL MEDICINE

## 2018-02-06 PROCEDURE — 99204 OFFICE O/P NEW MOD 45 MIN: CPT | Mod: S$GLB,,, | Performed by: INTERNAL MEDICINE

## 2018-02-06 PROCEDURE — 3008F BODY MASS INDEX DOCD: CPT | Mod: S$GLB,,, | Performed by: INTERNAL MEDICINE

## 2018-02-06 PROCEDURE — 1126F AMNT PAIN NOTED NONE PRSNT: CPT | Mod: S$GLB,,, | Performed by: INTERNAL MEDICINE

## 2018-02-06 PROCEDURE — 99999 PR PBB SHADOW E&M-EST. PATIENT-LVL III: CPT | Mod: PBBFAC,,, | Performed by: INTERNAL MEDICINE

## 2018-02-06 PROCEDURE — 1159F MED LIST DOCD IN RCRD: CPT | Mod: S$GLB,,, | Performed by: INTERNAL MEDICINE

## 2018-02-06 RX ORDER — PROPAFENONE HYDROCHLORIDE 150 MG/1
150 TABLET, COATED ORAL 2 TIMES DAILY
Qty: 180 TABLET | Refills: 3 | Status: SHIPPED | OUTPATIENT
Start: 2018-02-06 | End: 2018-05-10 | Stop reason: ALTCHOICE

## 2018-02-06 RX ORDER — METOPROLOL SUCCINATE 25 MG/1
25 TABLET, EXTENDED RELEASE ORAL DAILY
Qty: 90 TABLET | Refills: 3 | Status: SHIPPED | OUTPATIENT
Start: 2018-02-06 | End: 2018-12-19 | Stop reason: SDUPTHER

## 2018-02-06 RX ORDER — APIXABAN 5 MG/1
5 TABLET, FILM COATED ORAL 2 TIMES DAILY
Qty: 180 TABLET | Refills: 3 | Status: SHIPPED | OUTPATIENT
Start: 2018-02-06 | End: 2018-12-14 | Stop reason: SDUPTHER

## 2018-02-06 NOTE — LETTER
February 6, 2018      Jc Rey MD  [address]  [place] [address]           [place]  [address]  [place] [address]  Phone: [phone]          Patient: Umang Goldberg   MR Number: [id number]   YOB: 1950   Date of Visit: 2/6/2018       Dear Dr. Jc Rey:    Thank you for referring Umang Goldberg to me for evaluation. Attached you will find relevant portions of my assessment and plan of care.    If you have questions, please do not hesitate to call me. I look forward to following Umang Goldberg along with you.    Sincerely,    Skip Velazquez MD    Enclosure  CC:  No Recipients    If you would like to receive this communication electronically, please contact [email or web address] or [phone] to request more information on [employer] Link access.    For providers and/or their staff who would like to refer a patient to [facility], please contact us through our one-stop-shop provider referral line, [facility], at [phone].    If you feel you have received this communication in error or would no longer like to receive these types of communications, please e-mail [email or web address]

## 2018-02-06 NOTE — PROGRESS NOTES
[facility]    CC: Establish care  Chief Complaint   Patient presents with    Consult    Establish Care       Patient ID: Umang Goldberg is a 67 y.o. male with a past medical history of paroxysmal atrial fibrillation, diabetes   HPI  The patient was under care of another provider.  He is known to have paroxysmal atrial fibrillation currently on Rythmol and metoprolol.  Denies any chest pain, shortness of breath, orthopnea, PND, palpitation, syncope or presyncope  He recently had an echo and possibly a stress test at his prior cardiologists office.    Past Medical History:   Diagnosis Date    A-fib 1 year    Diabetes mellitus      Past Surgical History:   Procedure Laterality Date    BACK SURGERY      EYE SURGERY      HERNIA REPAIR       Social History     Social History    Marital status:      Spouse name: N/A    Number of children: N/A    Years of education: N/A     Occupational History    Not on file.     Social History Main Topics    Smoking status: Current Every Day Smoker    Smokeless tobacco: Never Used    Alcohol use Yes    Drug use: Yes     Types: Hydrocodone    Sexual activity: Not on file     Other Topics Concern    Not on file     Social History Narrative    No narrative on file     Family History   Problem Relation Age of Onset    Skin cancer Father     Psoriasis Daughter     Glaucoma Neg Hx     Macular degeneration Neg Hx     Retinal detachment Neg Hx     Melanoma Neg Hx     Lupus Neg Hx     Eczema Neg Hx        Review of patient's allergies indicates:   Allergen Reactions    Contrast media        Medication List with Changes/Refills   Current Medications    ASPIRIN 81 MG CHEW    Take 1 tablet (81 mg total) by mouth once daily.    ATORVASTATIN (LIPITOR) 20 MG TABLET    Take 1 tablet (20 mg total) by mouth once daily.    BLOOD SUGAR DIAGNOSTIC STRP    1 strip by Misc.(Non-Drug; Combo Route) route once daily.    BLOOD-GLUCOSE METER KIT    Use as instructed     "FLUOXETINE (PROZAC) 20 MG CAPSULE    Take 1 capsule (20 mg total) by mouth once daily.    IPRATROPIUM (ATROVENT) 0.06 % NASAL SPRAY    2 SPRAYS BY NASAL ROUTE 4 (FOUR) TIMES DAILY.    LANCETS MISC    1 lancet by Misc.(Non-Drug; Combo Route) route 2 (two) times daily with meals.    LANCING DEVICE MISC    1 Device by Misc.(Non-Drug; Combo Route) route 2 (two) times daily with meals.    METFORMIN (GLUCOPHAGE) 1000 MG TABLET    TAKE 1 TABLET (1,000 MG TOTAL) BY MOUTH 2 (TWO) TIMES DAILY.    SILDENAFIL, ANTIHYPERTENSIVE, (REVATIO) 20 MG TAB    TAKE THREE TABLETS BY MOUTH ONCE DAILY AS NEEDED   Changed and/or Refilled Medications    Modified Medication Previous Medication    ELIQUIS 5 MG TAB ELIQUIS 5 mg Tab       Take 1 tablet (5 mg total) by mouth 2 (two) times daily.    Take 5 mg by mouth 2 (two) times daily.    METOPROLOL SUCCINATE (TOPROL-XL) 25 MG 24 HR TABLET metoprolol succinate (TOPROL-XL) 25 MG 24 hr tablet       Take 1 tablet (25 mg total) by mouth once daily.    Take 25 mg by mouth once daily.    PROPAFENONE (RHTHYMOL) 150 MG TAB propafenone (RHTHYMOL) 150 MG Tab       Take 1 tablet (150 mg total) by mouth 2 (two) times daily.    Take 1 tablet by mouth 2 (two) times daily.       Review of Systems  Constitution: Denies chills, fever, and sweats.  HENT: Denies headaches or blurry vision.  Cardiovascular: Denies chest pain or irregular heart beat.  Respiratory: Denies cough or shortness of breath.  Gastrointestinal: Denies abdominal pain, nausea, or vomiting.  Musculoskeletal: Denies muscle cramps.  Neurological: Denies dizziness or focal weakness.  Psychiatric/Behavioral: Normal mental status.  Hematologic/Lymphatic: Denies bleeding problem or easy bruising/bleeding.  Skin: Denies rash or suspicious lesions    Physical Examination  BP (!) 150/74 (BP Location: Left arm)   Pulse 67   Ht 5' 11" (1.803 m)   Wt 90 kg (198 lb 6.6 oz)   SpO2 95%   BMI 27.67 kg/m²     Constitutional: No acute distress, " conversant  HEENT: Sclera anicteric, Pupils equal, round and reactive to light, extraocular motions intact, Oropharynx clear  Neck: No JVD, no carotid bruits  Cardiovascular: regular rate and rhythm, no murmur, rubs or gallops, normal S1/S2  Pulmonary: Clear to auscultation bilaterally  Abdominal: Abdomen soft, nontender, nondistended, positive bowel sounds  Extremities: No lower extremity edema,   Pulses:  Carotid pulses are 2+ on the right side, and 2+ on the left side.  Radial pulses are 2+ on the right side, and 2+ on the left side.     Skin: No ecchymosis, erythema, or ulcers  Psych: Alert and oriented x 3, appropriate affect  Neuro: CNII-XII intact, no focal deficits    Labs:  Most Recent Data  CBC:   Lab Results   Component Value Date    WBC 6.90 11/06/2017    HGB 14.9 11/06/2017    HCT 43.3 11/06/2017     11/06/2017    MCV 87 11/06/2017    RDW 13.4 11/06/2017     BMP:   Lab Results   Component Value Date     11/06/2017    K 4.6 11/06/2017     11/06/2017    CO2 27 11/06/2017    BUN 15 11/06/2017    CREATININE 0.8 11/06/2017     (H) 11/06/2017    CALCIUM 9.4 11/06/2017     LFTS;   Lab Results   Component Value Date    PROT 6.9 11/06/2017    ALBUMIN 3.9 11/06/2017    BILITOT 0.4 11/06/2017    AST 16 11/06/2017    ALKPHOS 51 (L) 11/06/2017    ALT 12 11/06/2017     COAGS: No results found for: INR, PROTIME, PTT  FLP:   Lab Results   Component Value Date    CHOL 114 (L) 11/06/2017    HDL 41 11/06/2017    LDLCALC 49.4 (L) 11/06/2017    TRIG 118 11/06/2017    CHOLHDL 36.0 11/06/2017     CARDIAC: No results found for: TROPONINI, CKMB, BNP    Imaging:    EKG: Normal sinus rhythm.      I have personally reviewed these images and echo data    Assessment/Plan:  Umang was seen today for consult and establish care.    Diagnoses and all orders for this visit:    Tobacco abuse    Paroxysmal atrial fibrillation  -     EKG 12-lead    Other orders  -     propafenone (RHTHYMOL) 150 MG Tab; Take 1 tablet  (150 mg total) by mouth 2 (two) times daily.  -     metoprolol succinate (TOPROL-XL) 25 MG 24 hr tablet; Take 1 tablet (25 mg total) by mouth once daily.  -     ELIQUIS 5 mg Tab; Take 1 tablet (5 mg total) by mouth 2 (two) times daily.      CHADSVAS- 3. HASBLED-1  I have requested all his medical records from his prior cardiologist'S office.  No change in his current medications were done.  We will continue him on his current anticoagulation.    Emphasized on the need of quitting smoking and I offered him smoking cessation.  Patient currently is trying to quit and will let me know if any further help as needed    Follow-up in about 6 months (around 8/6/2018).      Total duration of face to face visit time 45 minutes.  Total time spent counseling greater than fifty percent of total visit time.  Counseling included discussion regarding imaging findings, diagnosis, possibilities, treatment options, risks and benefits.  The patient had many questions regarding the options and long-term effect which were all answered to my best ability.      Skip Velazquez MD,MRCP,RPVI,FACC,FSCAI.  Interventional Cardiology   Phone [phone]

## 2018-03-20 ENCOUNTER — CLINICAL SUPPORT (OUTPATIENT)
Dept: FAMILY MEDICINE | Facility: CLINIC | Age: 68
End: 2018-03-20
Payer: MEDICARE

## 2018-03-20 DIAGNOSIS — E11.9 CONTROLLED TYPE 2 DIABETES MELLITUS WITHOUT COMPLICATION, WITHOUT LONG-TERM CURRENT USE OF INSULIN: ICD-10-CM

## 2018-03-20 LAB
CHOLEST SERPL-MCNC: 124 MG/DL
CHOLEST/HDLC SERPL: 3.4 {RATIO}
CREAT UR-MCNC: 90 MG/DL
ESTIMATED AVG GLUCOSE: 105 MG/DL
HBA1C MFR BLD HPLC: 5.3 %
HDLC SERPL-MCNC: 37 MG/DL
HDLC SERPL: 29.8 %
LDLC SERPL CALC-MCNC: 64.6 MG/DL
MICROALBUMIN UR DL<=1MG/L-MCNC: 14 UG/ML
MICROALBUMIN/CREATININE RATIO: 15.6 UG/MG
NONHDLC SERPL-MCNC: 87 MG/DL
TRIGL SERPL-MCNC: 112 MG/DL

## 2018-03-20 PROCEDURE — 83036 HEMOGLOBIN GLYCOSYLATED A1C: CPT

## 2018-03-20 PROCEDURE — 82043 UR ALBUMIN QUANTITATIVE: CPT

## 2018-03-20 PROCEDURE — 80061 LIPID PANEL: CPT

## 2018-03-26 ENCOUNTER — DOCUMENTATION ONLY (OUTPATIENT)
Dept: FAMILY MEDICINE | Facility: CLINIC | Age: 68
End: 2018-03-26

## 2018-03-26 NOTE — PROGRESS NOTES
Health Maintenance Due   Topic Date Due    Zoster Vaccine  04/09/2011    Pneumococcal (65+) (1 of 2 - PCV13) 04/09/2016    Foot Exam  05/15/2018

## 2018-03-27 ENCOUNTER — OFFICE VISIT (OUTPATIENT)
Dept: FAMILY MEDICINE | Facility: CLINIC | Age: 68
End: 2018-03-27
Payer: MEDICARE

## 2018-03-27 VITALS
BODY MASS INDEX: 27.69 KG/M2 | OXYGEN SATURATION: 96 % | WEIGHT: 197.75 LBS | DIASTOLIC BLOOD PRESSURE: 70 MMHG | HEIGHT: 71 IN | RESPIRATION RATE: 16 BRPM | TEMPERATURE: 98 F | HEART RATE: 69 BPM | SYSTOLIC BLOOD PRESSURE: 124 MMHG

## 2018-03-27 DIAGNOSIS — E11.8 CONTROLLED TYPE 2 DIABETES MELLITUS WITH COMPLICATION, WITHOUT LONG-TERM CURRENT USE OF INSULIN: Primary | ICD-10-CM

## 2018-03-27 DIAGNOSIS — E11.69 HYPERLIPIDEMIA ASSOCIATED WITH TYPE 2 DIABETES MELLITUS: ICD-10-CM

## 2018-03-27 DIAGNOSIS — I48.0 PAROXYSMAL ATRIAL FIBRILLATION: ICD-10-CM

## 2018-03-27 DIAGNOSIS — E78.5 HYPERLIPIDEMIA ASSOCIATED WITH TYPE 2 DIABETES MELLITUS: ICD-10-CM

## 2018-03-27 PROCEDURE — 3074F SYST BP LT 130 MM HG: CPT | Mod: CPTII,S$GLB,, | Performed by: INTERNAL MEDICINE

## 2018-03-27 PROCEDURE — 99214 OFFICE O/P EST MOD 30 MIN: CPT | Mod: S$GLB,,, | Performed by: INTERNAL MEDICINE

## 2018-03-27 PROCEDURE — 99499 UNLISTED E&M SERVICE: CPT | Mod: S$GLB,,, | Performed by: INTERNAL MEDICINE

## 2018-03-27 PROCEDURE — 3078F DIAST BP <80 MM HG: CPT | Mod: CPTII,S$GLB,, | Performed by: INTERNAL MEDICINE

## 2018-03-27 PROCEDURE — 3044F HG A1C LEVEL LT 7.0%: CPT | Mod: CPTII,S$GLB,, | Performed by: INTERNAL MEDICINE

## 2018-03-27 NOTE — PROGRESS NOTES
Subjective:       Patient ID: Umang Goldberg is a 67 y.o. male.    Chief Complaint: Hyperlipidemia (labs)    HPI       CHIEF COMPLAINT: Hyperlipidemia. cholesterol screening: no.   HPI:     ONSET:    MODIFIERS/TREATMENTS: . Taking medications: yes. . Non-compliance with following diet: no. .     SYMPTOMS/RELATED:Possible medication side effects include:   Myalgia: no.  .     REVIEW OF SYMPTOMS: past weights:   Wt Readings from Last 1 Encounters:   03/27/18 0808 89.7 kg (197 lb 12 oz)                                                     Last lipids: total   Lab Results   Component Value Date    CHOL 124 03/20/2018    CHOL 114 (L) 11/06/2017    CHOL 121 05/08/2017                                                                     HDL   Lab Results   Component Value Date    HDL 37 (L) 03/20/2018    HDL 41 11/06/2017    HDL 40 05/08/2017                                                                     LDL   Lab Results   Component Value Date    LDLCALC 64.6 03/20/2018    LDLCALC 49.4 (L) 11/06/2017    LDLCALC 59.6 (L) 05/08/2017                                                                     TRIG   Lab Results   Component Value Date    TRIG 112 03/20/2018    TRIG 118 11/06/2017    TRIG 107 05/08/2017     Atrial Fibrillation         No palpitations.  He is on Eliquis.  Recently his cardiologist took him off aspirin.  He's had no bleeding.      CHIEF COMPLAINT: Diabetes.  HPI:     ONSET/TIMING:     QUALITY/COURSE:   unchanged..      INTENSITY/SEVERITY: . Measures blood sugar :  1x/wk       Lowest recent . Average .     CONTEXT/WHEN: last HgbA1c    Lab Results   Component Value Date    HGBA1C 5.3 03/20/2018      weights:    Wt Readings from Last 1 Encounters:   03/27/18 0808 89.7 kg (197 lb 12 oz)         SYMPTOMS/RELATED: . . Possible medication side effects include:     The following symptoms/statements  are present IF IN BOLD, negative otherwise.         MODIFIERS/TREATMENTS: Not_taking_medications:  .   "Non-compliance_with_Diabetic_diet  .  No_eye_exam_within_last_year.  Not_practicing_good_foot_care.    REVIEW OF SYMPTOMS: . Weight_gain. Weight_loss. Neuropathy. Recurrent_infections. Skin_ulcers                                                               Review of Systems    Objective:      Vitals:    03/27/18 0808   BP: 124/70   Pulse: 69   Resp: 16   Temp: 98.2 °F (36.8 °C)   TempSrc: Oral   SpO2: 96%   Weight: 89.7 kg (197 lb 12 oz)   Height: 5' 11" (1.803 m)   PainSc: 0-No pain     Physical Exam   Constitutional: He appears well-developed and well-nourished.   Cardiovascular: Normal rate, regular rhythm and normal heart sounds.    Pulmonary/Chest: Effort normal and breath sounds normal.   Abdominal: Soft. There is no tenderness.   Neurological: He is alert.   Skin:   Feet okay   Psychiatric: He has a normal mood and affect. His behavior is normal. Thought content normal.   Nursing note and vitals reviewed.        Assessment:       No diagnosis found.      Plan:     There are no diagnoses linked to this encounter.  No Follow-up on file.      "

## 2018-03-27 NOTE — PATIENT INSTRUCTIONS
Avoid white carbohydrates.   Eat  a palm sized protein with each meal.       Walk for 10 minutes after you eat.  This will keep your sugars from going high at that time.    You do not need to check your sugars.

## 2018-04-04 DIAGNOSIS — L03.119 TYPE 2 DIABETES, CONTROLLED, WITH CELLULITIS OF FOOT: ICD-10-CM

## 2018-04-04 DIAGNOSIS — E11.628 TYPE 2 DIABETES, CONTROLLED, WITH CELLULITIS OF FOOT: ICD-10-CM

## 2018-04-04 RX ORDER — METFORMIN HYDROCHLORIDE 1000 MG/1
TABLET ORAL
Qty: 90 TABLET | Refills: 1 | Status: SHIPPED | OUTPATIENT
Start: 2018-04-04 | End: 2018-06-30 | Stop reason: SDUPTHER

## 2018-04-25 ENCOUNTER — TELEPHONE (OUTPATIENT)
Dept: SMOKING CESSATION | Facility: CLINIC | Age: 68
End: 2018-04-25

## 2018-05-01 ENCOUNTER — TELEPHONE (OUTPATIENT)
Dept: SMOKING CESSATION | Facility: CLINIC | Age: 68
End: 2018-05-01

## 2018-05-10 ENCOUNTER — TELEPHONE (OUTPATIENT)
Dept: CARDIOLOGY | Facility: CLINIC | Age: 68
End: 2018-05-10

## 2018-05-10 RX ORDER — FLECAINIDE ACETATE 50 MG/1
100 TABLET ORAL EVERY 12 HOURS
Qty: 120 TABLET | Refills: 11 | Status: SHIPPED | OUTPATIENT
Start: 2018-05-10 | End: 2018-08-10 | Stop reason: ALTCHOICE

## 2018-05-10 NOTE — TELEPHONE ENCOUNTER
Mr. Goldberg came into the office to let Dr. Velazquez know that his pharmacy told him his propafenone HCL 150mg is on back order until late June, and he only has one pill left.

## 2018-05-29 ENCOUNTER — TELEPHONE (OUTPATIENT)
Dept: FAMILY MEDICINE | Facility: CLINIC | Age: 68
End: 2018-05-29

## 2018-05-29 NOTE — TELEPHONE ENCOUNTER
----- Message from Rad Champion sent at 5/29/2018  8:46 AM CDT -----  Contact: Patient  Type:  Patient Returning Call    Who Called:  Umang Santana Left Message for Patient:  Not sure  Does the patient know what this is regarding?:  No voicemail left  Best Call Back Number:  [phone]  Additional Information:  n/a

## 2018-06-20 ENCOUNTER — TELEPHONE (OUTPATIENT)
Dept: SMOKING CESSATION | Facility: CLINIC | Age: 68
End: 2018-06-20

## 2018-06-20 NOTE — TELEPHONE ENCOUNTER
3rd attempt left message regarding smoking cessation quit 1 episode phone follow up. Will complete and resolve smart form for 3 attempts.

## 2018-06-21 ENCOUNTER — OFFICE VISIT (OUTPATIENT)
Dept: DERMATOLOGY | Facility: CLINIC | Age: 68
End: 2018-06-21
Payer: MEDICARE

## 2018-06-21 VITALS — HEIGHT: 71 IN | BODY MASS INDEX: 27.58 KG/M2 | WEIGHT: 197 LBS

## 2018-06-21 DIAGNOSIS — L82.1 SEBORRHEIC KERATOSES: ICD-10-CM

## 2018-06-21 DIAGNOSIS — Z85.828 HISTORY OF SKIN CANCER: ICD-10-CM

## 2018-06-21 DIAGNOSIS — L81.4 SOLAR LENTIGO: ICD-10-CM

## 2018-06-21 DIAGNOSIS — L57.0 ACTINIC KERATOSES: Primary | ICD-10-CM

## 2018-06-21 DIAGNOSIS — D18.01 CHERRY ANGIOMA: ICD-10-CM

## 2018-06-21 PROCEDURE — 99213 OFFICE O/P EST LOW 20 MIN: CPT | Mod: 25,S$GLB,, | Performed by: DERMATOLOGY

## 2018-06-21 PROCEDURE — 99999 PR PBB SHADOW E&M-EST. PATIENT-LVL III: CPT | Mod: PBBFAC,,, | Performed by: DERMATOLOGY

## 2018-06-21 PROCEDURE — 17000 DESTRUCT PREMALG LESION: CPT | Mod: S$GLB,,, | Performed by: DERMATOLOGY

## 2018-06-21 PROCEDURE — 17003 DESTRUCT PREMALG LES 2-14: CPT | Mod: S$GLB,,, | Performed by: DERMATOLOGY

## 2018-06-21 RX ORDER — PROPAFENONE HYDROCHLORIDE 150 MG/1
TABLET, COATED ORAL
COMMUNITY
Start: 2018-06-16 | End: 2018-08-10 | Stop reason: SDUPTHER

## 2018-06-21 NOTE — PROGRESS NOTES
Subjective:       Patient ID:  Umang Goldberg is a 68 y.o. male who presents for   Chief Complaint   Patient presents with    Spot     back, L arm     Patient last seen 12/2017 ; bx lesion on back  FINAL PATHOLOGIC DIAGNOSIS  1. Skin, left back, shave biopsy:  - HYPERTROPHIC ACTINIC KERATOSIS WITH FOCAL TRANSITION TO SQUAMOUS CELL CARCINOMA IN  SITU.  - MARGINS ARE NEGATIVE FOR FULL THICKNESS SQUAMOUS ATYPIA IN THE PLANES OF SECTION.  Here for reevaluation and new complaint    + FH psoriasis  Outdoor employment , now retired  Recreational exposure, cuts his grass          Spot  - Initial  Affected locations: left arm and back  Duration: months.  Signs / symptoms: itching and scaling  Severity: mild  Timing: constant  Aggravated by: nothing  Relieving factors/Treatments tried: nothing        Review of Systems   Constitutional: Negative for fever, chills, weight loss, weight gain, fatigue, night sweats and malaise.   Skin: Positive for wears hat. Negative for daily sunscreen use and activity-related sunscreen use.   Hematologic/Lymphatic: Does not bruise/bleed easily.        Objective:    Physical Exam   Constitutional: He appears well-developed and well-nourished. No distress.   Neurological: He is alert and oriented to person, place, and time. He is not disoriented.   Psychiatric: He has a normal mood and affect.   Skin:   Areas Examined (abnormalities noted in diagram):   Scalp / Hair Palpated and Inspected  Head / Face Inspection Performed  Neck Inspection Performed  Chest / Axilla Inspection Performed  Back Inspection Performed  RUE Inspected  LUE Inspection Performed                       Diagram Legend     Erythematous scaling macule/papule c/w actinic keratosis       Vascular papule c/w angioma      Pigmented verrucoid papule/plaque c/w seborrheic keratosis      Yellow umbilicated papule c/w sebaceous hyperplasia      Irregularly shaped tan macule c/w lentigo     1-2 mm smooth white papules consistent  with Milia      Movable subcutaneous cyst with punctum c/w epidermal inclusion cyst      Subcutaneous movable cyst c/w pilar cyst      Firm pink to brown papule c/w dermatofibroma      Pedunculated fleshy papule(s) c/w skin tag(s)      Evenly pigmented macule c/w junctional nevus     Mildly variegated pigmented, slightly irregular-bordered macule c/w mildly atypical nevus      Flesh colored to evenly pigmented papule c/w intradermal nevus       Pink pearly papule/plaque c/w basal cell carcinoma      Erythematous hyperkeratotic cursted plaque c/w SCC      Surgical scar with no sign of skin cancer recurrence      Open and closed comedones      Inflammatory papules and pustules      Verrucoid papule consistent consistent with wart     Erythematous eczematous patches and plaques     Dystrophic onycholytic nail with subungual debris c/w onychomycosis     Umbilicated papule    Erythematous-base heme-crusted tan verrucoid plaque consistent with inflamed seborrheic keratosis     Erythematous Silvery Scaling Plaque c/w Psoriasis     See annotation      Assessment / Plan:        Actinic keratoses  Cryosurgery Procedure Note    Verbal consent from the patient is obtained and the patient is aware of the precancerous quality and need for treatment of these lesions. Liquid nitrogen cryosurgery is applied to the 3 actinic keratoses, as detailed in the physical exam, to produce a freeze injury. The patient is aware that blisters may form and is instructed on wound care with gentle cleansing and use of vaseline ointment to keep moist until healed. The patient is supplied a handout on cryosurgery and is instructed to call if lesions do not completely resolve.    Seborrheic keratoses  These are benign inherited growths without a malignant potential. Reassurance given to patient. No treatment is necessary.     On lesion inflamed on midline back, patient declines cryotherapy    Solar lentigo  This is a benign hyperpigmented sun induced  lesion. Daily sun protection will reduce the number of new lesions. Treatment of these benign lesions are considered cosmetic.    Cherry angioma  This is a benign vascular lesion. Reassurance given. No treatment required.     History of skin cancer  Area of previous NMSC (AK/SCCIS transition R upper back) examined. Site well healed with no signs of recurrence.  Upper body skin examination performed today including at least 9 points as noted in physical examination. No lesions suspicious for malignancy noted.    Patient instructed in importance in daily sun protection of at least spf 30. Sun avoidance and topical protection discussed.   Recommend Elta MD (physician office) COTZ sensitive (available online) for daily use on face and neck.  Patient encouraged to wear hat for all outdoor exposure.   Also discussed sun protective clothing.             No Follow-up on file.

## 2018-06-21 NOTE — PATIENT INSTRUCTIONS

## 2018-06-30 DIAGNOSIS — E11.628 TYPE 2 DIABETES, CONTROLLED, WITH CELLULITIS OF FOOT: ICD-10-CM

## 2018-06-30 DIAGNOSIS — L03.119 TYPE 2 DIABETES, CONTROLLED, WITH CELLULITIS OF FOOT: ICD-10-CM

## 2018-07-02 RX ORDER — METFORMIN HYDROCHLORIDE 1000 MG/1
TABLET ORAL
Qty: 90 TABLET | Refills: 0 | Status: SHIPPED | OUTPATIENT
Start: 2018-07-02 | End: 2018-08-13 | Stop reason: SDUPTHER

## 2018-07-17 ENCOUNTER — TELEPHONE (OUTPATIENT)
Dept: FAMILY MEDICINE | Facility: CLINIC | Age: 68
End: 2018-07-17

## 2018-07-17 DIAGNOSIS — Z72.0 TOBACCO ABUSE: Primary | ICD-10-CM

## 2018-07-17 NOTE — TELEPHONE ENCOUNTER
----- Message from Erica Kaur sent at 7/17/2018 12:52 PM CDT -----  Contact: self   Patient need to speak with a nurse regarding getting into a smoking cessation class, please call back at [phone] (home)

## 2018-07-30 ENCOUNTER — CLINICAL SUPPORT (OUTPATIENT)
Dept: SMOKING CESSATION | Facility: CLINIC | Age: 68
End: 2018-07-30
Payer: COMMERCIAL

## 2018-07-30 DIAGNOSIS — F17.200 NICOTINE DEPENDENCE: Primary | ICD-10-CM

## 2018-07-30 PROCEDURE — 99404 PREV MED CNSL INDIV APPRX 60: CPT | Mod: S$GLB,,,

## 2018-07-30 PROCEDURE — 99999 PR PBB SHADOW E&M-EST. PATIENT-LVL I: CPT | Mod: PBBFAC,,,

## 2018-07-30 NOTE — Clinical Note
Patient returns to program for a Quit 2.  He is smokingabout 20-30 cigarettes per day. He is uncertain at this time of his confidence to quit.  He wanted to try Chantix to aid his quit and when I informed him of the $80 co-pay he said never mind, I will try to quit on my own. I offered the information for the [employer] Patient assistant Program and he stood up and said no thank you I don't want to waste your time., I'll just do this on my own. I offered counseling and coaching as well and he declined.

## 2018-08-10 ENCOUNTER — OFFICE VISIT (OUTPATIENT)
Dept: CARDIOLOGY | Facility: CLINIC | Age: 68
End: 2018-08-10
Payer: MEDICARE

## 2018-08-10 VITALS
HEIGHT: 71 IN | DIASTOLIC BLOOD PRESSURE: 76 MMHG | BODY MASS INDEX: 28.77 KG/M2 | HEART RATE: 70 BPM | SYSTOLIC BLOOD PRESSURE: 140 MMHG | WEIGHT: 205.5 LBS | OXYGEN SATURATION: 93 %

## 2018-08-10 DIAGNOSIS — I10 ESSENTIAL HYPERTENSION: ICD-10-CM

## 2018-08-10 DIAGNOSIS — E11.9 CONTROLLED TYPE 2 DIABETES MELLITUS WITHOUT COMPLICATION, WITHOUT LONG-TERM CURRENT USE OF INSULIN: Primary | ICD-10-CM

## 2018-08-10 DIAGNOSIS — I48.0 PAROXYSMAL ATRIAL FIBRILLATION: ICD-10-CM

## 2018-08-10 DIAGNOSIS — Z72.0 TOBACCO ABUSE: ICD-10-CM

## 2018-08-10 PROCEDURE — 93000 ELECTROCARDIOGRAM COMPLETE: CPT | Mod: S$GLB,,, | Performed by: INTERNAL MEDICINE

## 2018-08-10 PROCEDURE — 3078F DIAST BP <80 MM HG: CPT | Mod: CPTII,S$GLB,, | Performed by: INTERNAL MEDICINE

## 2018-08-10 PROCEDURE — 99499 UNLISTED E&M SERVICE: CPT | Mod: S$GLB,,, | Performed by: INTERNAL MEDICINE

## 2018-08-10 PROCEDURE — 99214 OFFICE O/P EST MOD 30 MIN: CPT | Mod: S$GLB,,, | Performed by: INTERNAL MEDICINE

## 2018-08-10 PROCEDURE — 3044F HG A1C LEVEL LT 7.0%: CPT | Mod: CPTII,S$GLB,, | Performed by: INTERNAL MEDICINE

## 2018-08-10 PROCEDURE — 3077F SYST BP >= 140 MM HG: CPT | Mod: CPTII,S$GLB,, | Performed by: INTERNAL MEDICINE

## 2018-08-10 PROCEDURE — 99999 PR PBB SHADOW E&M-EST. PATIENT-LVL III: CPT | Mod: PBBFAC,,, | Performed by: INTERNAL MEDICINE

## 2018-08-10 RX ORDER — PROPAFENONE HYDROCHLORIDE 150 MG/1
150 TABLET, COATED ORAL 2 TIMES DAILY
Qty: 90 TABLET | Refills: 3 | Status: SHIPPED | OUTPATIENT
Start: 2018-08-10 | End: 2019-05-08 | Stop reason: SDUPTHER

## 2018-08-10 NOTE — PROGRESS NOTES
[facility]    CC:  Follow-up appointment  Chief Complaint   Patient presents with    Follow-up     6 mon       Patient ID: Umang Goldberg is a 68 y.o. male with a past medical history of paroxysmal atrial fibrillation, diabetes, hypertension  HPI  Patient is feeling well.  Denies any excessive shortness of breath or chest pains.  I got the records of his stress test and echocardiography from the outside hospital    Past Medical History:   Diagnosis Date    A-fib 1 year    Diabetes mellitus      Past Surgical History:   Procedure Laterality Date    BACK SURGERY      EYE SURGERY      HERNIA REPAIR       Social History     Social History    Marital status:      Spouse name: N/A    Number of children: N/A    Years of education: N/A     Occupational History    Not on file.     Social History Main Topics    Smoking status: Current Every Day Smoker    Smokeless tobacco: Never Used    Alcohol use Yes    Drug use: Yes     Types: Hydrocodone    Sexual activity: Not on file     Other Topics Concern    Not on file     Social History Narrative    No narrative on file     Family History   Problem Relation Age of Onset    Skin cancer Father     Psoriasis Daughter     Glaucoma Neg Hx     Macular degeneration Neg Hx     Retinal detachment Neg Hx     Melanoma Neg Hx     Lupus Neg Hx     Eczema Neg Hx        Review of patient's allergies indicates:   Allergen Reactions    Contrast media        Medication List with Changes/Refills   Current Medications    ATORVASTATIN (LIPITOR) 20 MG TABLET    Take 1 tablet (20 mg total) by mouth once daily.    BLOOD SUGAR DIAGNOSTIC STRP    1 strip by Misc.(Non-Drug; Combo Route) route once daily.    BLOOD-GLUCOSE METER KIT    Use as instructed    ELIQUIS 5 MG TAB    Take 1 tablet (5 mg total) by mouth 2 (two) times daily.    FLUOXETINE (PROZAC) 20 MG CAPSULE    Take 1 capsule (20 mg total) by mouth once daily.    IPRATROPIUM (ATROVENT) 0.06 % NASAL SPRAY   "  2 SPRAYS BY NASAL ROUTE 4 (FOUR) TIMES DAILY.    LANCETS MISC    1 lancet by Misc.(Non-Drug; Combo Route) route 2 (two) times daily with meals.    LANCING DEVICE MISC    1 Device by Misc.(Non-Drug; Combo Route) route 2 (two) times daily with meals.    METFORMIN (GLUCOPHAGE) 1000 MG TABLET    TAKE 1 TABLET BY MOUTH TWICE A DAY    METOPROLOL SUCCINATE (TOPROL-XL) 25 MG 24 HR TABLET    Take 1 tablet (25 mg total) by mouth once daily.    SILDENAFIL, ANTIHYPERTENSIVE, (REVATIO) 20 MG TAB    TAKE THREE TABLETS BY MOUTH ONCE DAILY AS NEEDED   Changed and/or Refilled Medications    Modified Medication Previous Medication    PROPAFENONE (RHTHYMOL) 150 MG TAB propafenone (RHTHYMOL) 150 MG Tab       Take 1 tablet (150 mg total) by mouth 2 (two) times daily.       Discontinued Medications    FLECAINIDE (TAMBOCOR) 50 MG TAB    Take 2 tablets (100 mg total) by mouth every 12 (twelve) hours.       Review of Systems  Constitution: Denies chills, fever, and sweats.  HENT: Denies headaches or blurry vision.  Cardiovascular: Denies chest pain or irregular heart beat.  Respiratory: Denies cough or shortness of breath.  Gastrointestinal: Denies abdominal pain, nausea, or vomiting.  Musculoskeletal: Denies muscle cramps.  Neurological: Denies dizziness or focal weakness.  Psychiatric/Behavioral: Normal mental status.  Hematologic/Lymphatic: Denies bleeding problem or easy bruising/bleeding.  Skin: Denies rash or suspicious lesions    Physical Examination  BP (!) 140/76 (BP Location: Right arm, Patient Position: Sitting)   Pulse 70   Ht 5' 11" (1.803 m)   Wt 93.2 kg (205 lb 7.5 oz)   SpO2 (!) 93%   BMI 28.66 kg/m²     Constitutional: No acute distress, conversant  HEENT: Sclera anicteric, Pupils equal, round and reactive to light, extraocular motions intact, Oropharynx clear  Neck: No JVD, no carotid bruits  Cardiovascular: regular rate and rhythm, no murmur, rubs or gallops, normal S1/S2  Pulmonary: Clear to auscultation " bilaterally  Abdominal: Abdomen soft, nontender, nondistended, positive bowel sounds  Extremities: No lower extremity edema,   Pulses:  Carotid pulses are 2+ on the right side, and 2+ on the left side.  Radial pulses are 2+ on the right side, and 2+ on the left side.     Skin: No ecchymosis, erythema, or ulcers  Psych: Alert and oriented x 3, appropriate affect  Neuro: CNII-XII intact, no focal deficits    Labs:  Most Recent Data  CBC:   Lab Results   Component Value Date    WBC 6.90 11/06/2017    HGB 14.9 11/06/2017    HCT 43.3 11/06/2017     11/06/2017    MCV 87 11/06/2017    RDW 13.4 11/06/2017     BMP:   Lab Results   Component Value Date     11/06/2017    K 4.6 11/06/2017     11/06/2017    CO2 27 11/06/2017    BUN 15 11/06/2017    CREATININE 0.8 11/06/2017     (H) 11/06/2017    CALCIUM 9.4 11/06/2017     LFTS;   Lab Results   Component Value Date    PROT 6.9 11/06/2017    ALBUMIN 3.9 11/06/2017    BILITOT 0.4 11/06/2017    AST 16 11/06/2017    ALKPHOS 51 (L) 11/06/2017    ALT 12 11/06/2017     COAGS: No results found for: INR, PROTIME, PTT  FLP:   Lab Results   Component Value Date    CHOL 124 03/20/2018    HDL 37 (L) 03/20/2018    LDLCALC 64.6 03/20/2018    TRIG 112 03/20/2018    CHOLHDL 29.8 03/20/2018     CARDIAC: No results found for: TROPONINI, CKMB, BNP    Imaging:    EKG:  Sinus rhythm.    Echo:  Mild concentric LVH.  Normal ejection fraction.  Mildly dilated right ventricle trace MR Trace AI    Stress Testing:  Regadenoson nuclear stress test done in September 2016- negative for ischemia.  I have personally reviewed these images and echo data    Assessment/Plan:  Umang was seen today for follow-up.    Diagnoses and all orders for this visit:    Controlled type 2 diabetes mellitus without complication, without long-term current use of insulin    Paroxysmal atrial fibrillation  -     EKG 12-lead  -     CBC Without Differential; Future    Tobacco abuse    Essential  hypertension    Other orders  -     propafenone (RHTHYMOL) 150 MG Tab; Take 1 tablet (150 mg total) by mouth 2 (two) times daily.        Patient continues to be on Eliquis.  He is on anti rhythmic along with a beta-blocker.  Recommendation regarding smoking cessation provided.  Recommendation regarding healthy lifestyle, diet and exercise given to the patient    Follow-up in about 1 year (around 8/10/2019).          Total duration of face to face visit time 30 minutes.  Total time spent counseling greater than fifty percent of total visit time.  Counseling included discussion regarding imaging findings, diagnosis, possibilities, treatment options, risks and benefits.  The patient had many questions regarding the options and long-term effect which were all answered to my best ability.      Skip Velazquez MD,MRCP,RPVI,FACC,FSCAI.  Interventional Cardiology   Phone [phone]

## 2018-08-13 DIAGNOSIS — L03.119 TYPE 2 DIABETES, CONTROLLED, WITH CELLULITIS OF FOOT: ICD-10-CM

## 2018-08-13 DIAGNOSIS — E11.628 TYPE 2 DIABETES, CONTROLLED, WITH CELLULITIS OF FOOT: ICD-10-CM

## 2018-08-13 RX ORDER — METFORMIN HYDROCHLORIDE 1000 MG/1
TABLET ORAL
Qty: 90 TABLET | Refills: 0 | Status: SHIPPED | OUTPATIENT
Start: 2018-08-13 | End: 2018-10-01 | Stop reason: SDUPTHER

## 2018-08-20 DIAGNOSIS — E11.69 HYPERLIPIDEMIA ASSOCIATED WITH TYPE 2 DIABETES MELLITUS: ICD-10-CM

## 2018-08-20 DIAGNOSIS — E78.5 HYPERLIPIDEMIA ASSOCIATED WITH TYPE 2 DIABETES MELLITUS: ICD-10-CM

## 2018-08-20 RX ORDER — ATORVASTATIN CALCIUM 20 MG/1
20 TABLET, FILM COATED ORAL DAILY
Qty: 90 TABLET | Refills: 3 | Status: SHIPPED | OUTPATIENT
Start: 2018-08-20 | End: 2019-08-01 | Stop reason: SDUPTHER

## 2018-09-17 ENCOUNTER — CLINICAL SUPPORT (OUTPATIENT)
Dept: FAMILY MEDICINE | Facility: CLINIC | Age: 68
End: 2018-09-17
Payer: MEDICARE

## 2018-09-17 DIAGNOSIS — E78.5 HYPERLIPIDEMIA ASSOCIATED WITH TYPE 2 DIABETES MELLITUS: ICD-10-CM

## 2018-09-17 DIAGNOSIS — E11.8 CONTROLLED TYPE 2 DIABETES MELLITUS WITH COMPLICATION, WITHOUT LONG-TERM CURRENT USE OF INSULIN: ICD-10-CM

## 2018-09-17 DIAGNOSIS — E11.69 HYPERLIPIDEMIA ASSOCIATED WITH TYPE 2 DIABETES MELLITUS: ICD-10-CM

## 2018-09-17 LAB
ALBUMIN SERPL BCP-MCNC: 4 G/DL
ALBUMIN/CREAT UR: 112.3 UG/MG
ALP SERPL-CCNC: 51 U/L
ALT SERPL W/O P-5'-P-CCNC: 18 U/L
ANION GAP SERPL CALC-SCNC: 14 MMOL/L
AST SERPL-CCNC: 14 U/L
BILIRUB SERPL-MCNC: 0.6 MG/DL
BUN SERPL-MCNC: 16 MG/DL
CALCIUM SERPL-MCNC: 9.7 MG/DL
CHLORIDE SERPL-SCNC: 101 MMOL/L
CHOLEST SERPL-MCNC: 121 MG/DL
CHOLEST/HDLC SERPL: 2.9 {RATIO}
CO2 SERPL-SCNC: 24 MMOL/L
CREAT SERPL-MCNC: 0.9 MG/DL
CREAT UR-MCNC: 253 MG/DL
EST. GFR  (AFRICAN AMERICAN): >60 ML/MIN/1.73 M^2
EST. GFR  (NON AFRICAN AMERICAN): >60 ML/MIN/1.73 M^2
ESTIMATED AVG GLUCOSE: 108 MG/DL
GLUCOSE SERPL-MCNC: 126 MG/DL
HBA1C MFR BLD HPLC: 5.4 %
HDLC SERPL-MCNC: 42 MG/DL
HDLC SERPL: 34.7 %
LDLC SERPL CALC-MCNC: 49.6 MG/DL
MICROALBUMIN UR DL<=1MG/L-MCNC: 284 UG/ML
NONHDLC SERPL-MCNC: 79 MG/DL
POTASSIUM SERPL-SCNC: 4.5 MMOL/L
PROT SERPL-MCNC: 7 G/DL
SODIUM SERPL-SCNC: 139 MMOL/L
TRIGL SERPL-MCNC: 147 MG/DL

## 2018-09-17 PROCEDURE — 80061 LIPID PANEL: CPT

## 2018-09-17 PROCEDURE — 83036 HEMOGLOBIN GLYCOSYLATED A1C: CPT

## 2018-09-17 PROCEDURE — 82043 UR ALBUMIN QUANTITATIVE: CPT

## 2018-09-17 PROCEDURE — 80053 COMPREHEN METABOLIC PANEL: CPT

## 2018-09-24 ENCOUNTER — DOCUMENTATION ONLY (OUTPATIENT)
Dept: FAMILY MEDICINE | Facility: CLINIC | Age: 68
End: 2018-09-24

## 2018-09-24 ENCOUNTER — OFFICE VISIT (OUTPATIENT)
Dept: FAMILY MEDICINE | Facility: CLINIC | Age: 68
End: 2018-09-24
Payer: MEDICARE

## 2018-09-24 VITALS
HEART RATE: 68 BPM | DIASTOLIC BLOOD PRESSURE: 70 MMHG | WEIGHT: 211.19 LBS | HEIGHT: 71 IN | TEMPERATURE: 98 F | OXYGEN SATURATION: 95 % | BODY MASS INDEX: 29.56 KG/M2 | SYSTOLIC BLOOD PRESSURE: 126 MMHG | RESPIRATION RATE: 16 BRPM

## 2018-09-24 DIAGNOSIS — Z72.0 TOBACCO ABUSE: ICD-10-CM

## 2018-09-24 DIAGNOSIS — R60.0 BILATERAL LEG EDEMA: ICD-10-CM

## 2018-09-24 DIAGNOSIS — E11.21 WELL CONTROLLED TYPE 2 DIABETES MELLITUS WITH NEPHROPATHY: ICD-10-CM

## 2018-09-24 DIAGNOSIS — E11.69 HYPERLIPIDEMIA ASSOCIATED WITH TYPE 2 DIABETES MELLITUS: ICD-10-CM

## 2018-09-24 DIAGNOSIS — R53.83 MALAISE AND FATIGUE: Primary | ICD-10-CM

## 2018-09-24 DIAGNOSIS — Z23 NEEDS FLU SHOT: ICD-10-CM

## 2018-09-24 DIAGNOSIS — R53.81 MALAISE AND FATIGUE: Primary | ICD-10-CM

## 2018-09-24 DIAGNOSIS — F51.12 INSUFFICIENT SLEEP SYNDROME: ICD-10-CM

## 2018-09-24 DIAGNOSIS — E78.5 HYPERLIPIDEMIA ASSOCIATED WITH TYPE 2 DIABETES MELLITUS: ICD-10-CM

## 2018-09-24 PROCEDURE — 3288F FALL RISK ASSESSMENT DOCD: CPT | Mod: CPTII,S$GLB,, | Performed by: INTERNAL MEDICINE

## 2018-09-24 PROCEDURE — 3078F DIAST BP <80 MM HG: CPT | Mod: CPTII,S$GLB,, | Performed by: INTERNAL MEDICINE

## 2018-09-24 PROCEDURE — 3044F HG A1C LEVEL LT 7.0%: CPT | Mod: CPTII,S$GLB,, | Performed by: INTERNAL MEDICINE

## 2018-09-24 PROCEDURE — 3074F SYST BP LT 130 MM HG: CPT | Mod: CPTII,S$GLB,, | Performed by: INTERNAL MEDICINE

## 2018-09-24 PROCEDURE — 1100F PTFALLS ASSESS-DOCD GE2>/YR: CPT | Mod: CPTII,S$GLB,, | Performed by: INTERNAL MEDICINE

## 2018-09-24 PROCEDURE — 99214 OFFICE O/P EST MOD 30 MIN: CPT | Mod: S$GLB,,, | Performed by: INTERNAL MEDICINE

## 2018-09-24 PROCEDURE — 90662 IIV NO PRSV INCREASED AG IM: CPT | Mod: S$GLB,,, | Performed by: INTERNAL MEDICINE

## 2018-09-24 PROCEDURE — G0008 ADMIN INFLUENZA VIRUS VAC: HCPCS | Mod: S$GLB,,, | Performed by: INTERNAL MEDICINE

## 2018-09-24 RX ORDER — FUROSEMIDE 20 MG/1
20 TABLET ORAL DAILY PRN
Qty: 15 TABLET | Refills: 11 | Status: SHIPPED | OUTPATIENT
Start: 2018-09-24 | End: 2020-08-04 | Stop reason: SDUPTHER

## 2018-09-24 RX ORDER — LISINOPRIL 5 MG/1
5 TABLET ORAL DAILY
Qty: 90 TABLET | Refills: 3 | Status: SHIPPED | OUTPATIENT
Start: 2018-09-24 | End: 2019-09-03 | Stop reason: SDUPTHER

## 2018-09-24 RX ORDER — BUPROPION HYDROCHLORIDE 75 MG/1
75 TABLET ORAL 2 TIMES DAILY
Qty: 60 TABLET | Refills: 11 | Status: SHIPPED | OUTPATIENT
Start: 2018-09-24 | End: 2018-12-21

## 2018-09-24 NOTE — PROGRESS NOTES
Subjective:       Patient ID: Umang Goldberg is a 68 y.o. male.    Chief Complaint: Diabetes (labs) and Foot Swelling    HPI     Wants to quit smoking can't afford Chantix    CHIEF COMPLAINT: Fatigue(+).  HPI:  Napping.  History of atrial fibrillation.    ONSET/TIMING: Onset   3 mo   ago. Sudden: .no . .Similar problems in past? yes     DURATION:       QUALITY/COURSE:  worse    INTENSITY/SEVERITY:.Severity is #       7 (10 point scale)    SYMPTOMS/RELATED:  Possible medication side effects include:     The following symptoms/statements are positive if BOLD, negative otherwise.        CONTEXT/WHEN:.--Fatigued_after_good_night's_rest. Worse_in_evenings.  Worse_after_taking_a_medication.. Similar_problems.    Exposure_to_others_with_similar_symptoms.    MODIFIERS/TREATMENTS:    Exercise.    meds:     REVIEW OF SYMPTOMS:  Anorexia. Depression. Stress. Fever. Headache. Neck_Pain. Lymphadenopathy. Sore_Throat. Rhinorrhea. Coryza. Cough. Chest_Pain. Abdomen_Pain. Nausea. Diarrhea. Urinary_Problems. Rash. Tick_Bites. Weight-gain.  Weight-loss.   Arthralgias . Loss-of-concentration. Loss-of-interests. Disordered-sleep. Cold-intolerance.  Heat-intolerance.  polyuria. polydipsia.    CHIEF COMPLAINT: leg edema.  HPI: no immobilization. On eliquis    ONSET/TIMING: Onset     1 wk    ago. Sudden: no..Trauma: no.. Similar problems in past: 3 mo ago    DURATION: .Intermittent    QUALITY/COURSE:  better. .  Pain: no.    LOCATION: Right: yes   Left: yes, was worse    INTENSITY/SEVERITY:  Severity    5    (on a 1-10 scale).    CONTEXT/WHEN:  Similar problems: yes  . Late in day: yes.  Weights:   Wt Readings from Last 1 Encounters:   09/24/18 0810 95.8 kg (211 lb 3.2 oz)         MODIFIERS/TREATMENTS: Taking medications ? yes..   Compliant with taking prescribed medications ? yes.   Medication not effective now ? yes  .Prior ultrasound: no  Immobilization: no.     SYMPTOMS/RELATED:  Possible medication side effects include:     The  following are positive if BOLD, negative otherwise.      REVIEW OF SYSTEMS :    congestive heart_failure . Liver_disease . Alcohol_abuse . blumeia . Licorice_use . . . drugs: steroids . clonidine . aldomet . MAO_inh . Ca_challel_blockers . alpha-blocker . hydralazine . minoxidil . NSAIDS.         CHIEF COMPLAINT: Diabetes.  HPI:     ONSET/TIMING:     QUALITY/COURSE:   unchanged..      INTENSITY/SEVERITY: . Measures blood sugar :  0       CONTEXT/WHEN: last HgbA1c    Lab Results   Component Value Date    HGBA1C 5.4 09/17/2018      weights:    Wt Readings from Last 1 Encounters:   09/24/18 0810 95.8 kg (211 lb 3.2 oz)         SYMPTOMS/RELATED: . . Possible medication side effects include:     The following symptoms/statements  are present IF IN BOLD, negative otherwise.         MODIFIERS/TREATMENTS: Not_taking_medications:  .  Non-compliance_with_Diabetic_diet  .  No_eye_exam_within_last_year.  Not_practicing_good_foot_care.    REVIEW OF SYMPTOMS: . Weight_gain. Weight_loss. Neuropathy. Recurrent_infections. Skin_ulcers      CHIEF COMPLAINT: Hyperlipidemia. cholesterol screening: no.   HPI:     ONSET:    MODIFIERS/TREATMENTS: . Taking medications: yes. . Non-compliance with following diet: no. .     SYMPTOMS/RELATED:Possible medication side effects include:   Myalgia: no.  .     REVIEW OF SYMPTOMS: past weights:   Wt Readings from Last 1 Encounters:   09/24/18 0810 95.8 kg (211 lb 3.2 oz)                                                     Last lipids: total   Lab Results   Component Value Date    CHOL 121 09/17/2018    CHOL 124 03/20/2018    CHOL 114 (L) 11/06/2017                                                                     HDL   Lab Results   Component Value Date    HDL 42 09/17/2018    HDL 37 (L) 03/20/2018    HDL 41 11/06/2017                                                                     LDL   Lab Results   Component Value Date    LDLCALC 49.6 (L) 09/17/2018    LDLCALC 64.6 03/20/2018    LDLCALC 49.4  "(L) 11/06/2017                                                                     TRIG   Lab Results   Component Value Date    TRIG 147 09/17/2018    TRIG 112 03/20/2018    TRIG 118 11/06/2017                                                                         Review of Systems   Constitutional: Negative for diaphoresis, fatigue and unexpected weight change.   Respiratory: Negative for chest tightness and shortness of breath.    Cardiovascular: Positive for leg swelling. Negative for chest pain.   Endocrine: Negative for polyphagia and polyuria.   Neurological: Negative for numbness.         Objective:      Vitals:    09/24/18 0810   BP: 126/70   Pulse: 68   Resp: 16   Temp: 97.7 °F (36.5 °C)   TempSrc: Oral   SpO2: 95%   Weight: 95.8 kg (211 lb 3.2 oz)   Height: 5' 11" (1.803 m)   PainSc: 0-No pain     Physical Exam   Constitutional: He appears well-developed and well-nourished.   Cardiovascular: Normal rate, regular rhythm and normal heart sounds.   Pulmonary/Chest: Effort normal and breath sounds normal. No respiratory distress. He has no wheezes.   Abdominal: Soft. Bowel sounds are normal. There is no tenderness.   Musculoskeletal:        Right foot: There is normal range of motion and no deformity.        Left foot: There is normal range of motion and no deformity.   Feet:   Right Foot:   Protective Sensation: 5 sites tested. 5 sites sensed.   Skin Integrity: Negative for ulcer, blister, skin breakdown, erythema, warmth, callus or dry skin.   Left Foot:   Protective Sensation: 5 sites tested. 5 sites sensed.   Skin Integrity: Negative for ulcer, blister, skin breakdown, erythema, warmth, callus or dry skin.       albumin creatinine ratio 128  Assessment:       1. Malaise and fatigue    2. Bilateral leg edema    3. 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 Hyperlipidemia associated with type 2 diabetes mellitus    5. Needs flu shot    6. Insufficient sleep syndrome     7. Tobacco abuse "          Plan:       Malaise and fatigue  -     CBC auto differential; Future; Expected date: 09/24/2018  -     Comprehensive metabolic panel; Future; Expected date: 09/24/2018  -     TSH; Future; Expected date: 09/24/2018  -     Polysomnography 4 or more parameters; Future  -     buPROPion (WELLBUTRIN) 75 MG tablet; Take 1 tablet (75 mg total) by mouth 2 (two) times daily.  Dispense: 60 tablet; Refill: 11    Bilateral leg edema  -     Brain natriuretic peptide; Future; Expected date: 09/24/2018  -     D dimer, quantitative; Future; Expected date: 09/24/2018  -     furosemide (LASIX) 20 MG tablet; Take 1 tablet (20 mg total) by mouth daily as needed.  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  Dispense: 90 tablet; Refill: 3    Hyperlipidemia associated with type 2 diabetes mellitus  -     Lipid panel; Future; Expected date: 09/24/2018    Needs flu shot  -     Influenza - High Dose (65+) (PF) (IM)    Insufficient sleep syndrome   -     Polysomnography 4 or more parameters; Future    Tobacco abuse  -     buPROPion (WELLBUTRIN) 75 MG tablet; Take 1 tablet (75 mg total) by mouth 2 (two) times daily.  Dispense: 60 tablet; Refill: 11      Follow-up in about 3 months (around 12/24/2018) for if you are not better return in one month.

## 2018-09-24 NOTE — PROGRESS NOTES
Health Maintenance Due   Topic Date Due    Zoster Vaccine  04/09/2011    Pneumococcal (65+) (1 of 2 - PCV13) 04/09/2016    Foot Exam  05/15/2018    Influenza Vaccine  08/01/2018    Eye Exam  10/23/2018

## 2018-09-24 NOTE — PATIENT INSTRUCTIONS
Lose 5 pounds.  Suggest [place] diet    Avoid white carbohydrates.   Eat  a palm sized protein with each meal.       Walk for 10 minutes after you eat.  This will keep your sugars from going high at that time.    Do not take naps    Stop smoking

## 2018-10-01 DIAGNOSIS — L03.119 TYPE 2 DIABETES, CONTROLLED, WITH CELLULITIS OF FOOT: ICD-10-CM

## 2018-10-01 DIAGNOSIS — E11.628 TYPE 2 DIABETES, CONTROLLED, WITH CELLULITIS OF FOOT: ICD-10-CM

## 2018-10-01 RX ORDER — METFORMIN HYDROCHLORIDE 1000 MG/1
TABLET ORAL
Qty: 90 TABLET | Refills: 0 | Status: SHIPPED | OUTPATIENT
Start: 2018-10-01 | End: 2018-11-11 | Stop reason: SDUPTHER

## 2018-11-11 DIAGNOSIS — E11.628 TYPE 2 DIABETES, CONTROLLED, WITH CELLULITIS OF FOOT: ICD-10-CM

## 2018-11-11 DIAGNOSIS — L03.119 TYPE 2 DIABETES, CONTROLLED, WITH CELLULITIS OF FOOT: ICD-10-CM

## 2018-11-12 RX ORDER — METFORMIN HYDROCHLORIDE 1000 MG/1
TABLET ORAL
Qty: 90 TABLET | Refills: 0 | Status: SHIPPED | OUTPATIENT
Start: 2018-11-12 | End: 2018-12-26 | Stop reason: SDUPTHER

## 2018-11-12 NOTE — TELEPHONE ENCOUNTER
----- Message from Annabella Hearn sent at 11/12/2018  9:03 AM CST -----  Contact: Umang  Type:  RX Refill Request    Who Called:  patient  Refill or New Rx:  refill  RX Name and Strength:  metFORMIN (GLUCOPHAGE) 1000 MG tablet  How is the patient currently taking it? (ex. 1XDay):   TAKE 1 TABLET BY MOUTH TWICE A DAY  Is this a 30 day or 90 day RX:  90  Preferred Pharmacy with phone number:    [facility]pharm[facility] #[id number] - [place] - [address]  [address] [place] [address]  Phone: [phone] Fax: [phone]  Local or Mail Order:  local  Ordering Provider:  Dr Jc Rey  [facility] Call Back Number:  [phone]  Additional Information:  rajesh

## 2018-11-29 DIAGNOSIS — E11.9 TYPE 2 DIABETES MELLITUS WITHOUT COMPLICATION, UNSPECIFIED WHETHER LONG TERM INSULIN USE: ICD-10-CM

## 2018-12-07 DIAGNOSIS — Z12.11 COLON CANCER SCREENING: Primary | ICD-10-CM

## 2018-12-14 ENCOUNTER — CLINICAL SUPPORT (OUTPATIENT)
Dept: FAMILY MEDICINE | Facility: CLINIC | Age: 68
End: 2018-12-14
Payer: MEDICARE

## 2018-12-14 DIAGNOSIS — R53.83 MALAISE AND FATIGUE: ICD-10-CM

## 2018-12-14 DIAGNOSIS — E11.69 HYPERLIPIDEMIA ASSOCIATED WITH TYPE 2 DIABETES MELLITUS: ICD-10-CM

## 2018-12-14 DIAGNOSIS — E78.5 HYPERLIPIDEMIA ASSOCIATED WITH TYPE 2 DIABETES MELLITUS: ICD-10-CM

## 2018-12-14 DIAGNOSIS — R60.0 BILATERAL LEG EDEMA: ICD-10-CM

## 2018-12-14 DIAGNOSIS — E11.21 WELL CONTROLLED TYPE 2 DIABETES MELLITUS WITH NEPHROPATHY: ICD-10-CM

## 2018-12-14 DIAGNOSIS — R53.81 MALAISE AND FATIGUE: ICD-10-CM

## 2018-12-14 DIAGNOSIS — E11.9 CONTROLLED TYPE 2 DIABETES MELLITUS WITHOUT COMPLICATION, WITHOUT LONG-TERM CURRENT USE OF INSULIN: ICD-10-CM

## 2018-12-16 RX ORDER — APIXABAN 5 MG/1
5 TABLET, FILM COATED ORAL 2 TIMES DAILY
Qty: 180 TABLET | Refills: 2 | Status: SHIPPED | OUTPATIENT
Start: 2018-12-16 | End: 2018-12-19 | Stop reason: SDUPTHER

## 2018-12-17 ENCOUNTER — APPOINTMENT (OUTPATIENT)
Dept: LAB | Facility: HOSPITAL | Age: 68
End: 2018-12-17
Attending: INTERNAL MEDICINE
Payer: MEDICARE

## 2018-12-17 DIAGNOSIS — R53.83 LETHARGIC: ICD-10-CM

## 2018-12-17 DIAGNOSIS — E11.00 TYPE 2 DIABETES MELLITUS WITH HYPEROSMOLARITY WITHOUT COMA, WITHOUT LONG-TERM CURRENT USE OF INSULIN: Primary | ICD-10-CM

## 2018-12-17 DIAGNOSIS — R60.0 LOCALIZED EDEMA: ICD-10-CM

## 2018-12-17 DIAGNOSIS — E78.2 MIXED HYPERLIPIDEMIA: ICD-10-CM

## 2018-12-17 DIAGNOSIS — R53.81 MALAISE: Primary | ICD-10-CM

## 2018-12-19 RX ORDER — METOPROLOL SUCCINATE 25 MG/1
25 TABLET, EXTENDED RELEASE ORAL DAILY
Qty: 90 TABLET | Refills: 3 | Status: SHIPPED | OUTPATIENT
Start: 2018-12-19

## 2018-12-21 ENCOUNTER — OFFICE VISIT (OUTPATIENT)
Dept: FAMILY MEDICINE | Facility: CLINIC | Age: 68
End: 2018-12-21
Payer: MEDICARE

## 2018-12-21 VITALS
WEIGHT: 211.63 LBS | SYSTOLIC BLOOD PRESSURE: 150 MMHG | HEIGHT: 71 IN | HEART RATE: 74 BPM | RESPIRATION RATE: 14 BRPM | BODY MASS INDEX: 29.63 KG/M2 | TEMPERATURE: 98 F | DIASTOLIC BLOOD PRESSURE: 82 MMHG | OXYGEN SATURATION: 96 %

## 2018-12-21 DIAGNOSIS — R25.2 LEG CRAMPS: ICD-10-CM

## 2018-12-21 DIAGNOSIS — N52.9 ERECTILE DYSFUNCTION, UNSPECIFIED ERECTILE DYSFUNCTION TYPE: ICD-10-CM

## 2018-12-21 DIAGNOSIS — I15.2 HYPERTENSION ASSOCIATED WITH DIABETES: ICD-10-CM

## 2018-12-21 DIAGNOSIS — E11.59 HYPERTENSION ASSOCIATED WITH DIABETES: ICD-10-CM

## 2018-12-21 DIAGNOSIS — R53.81 MALAISE AND FATIGUE: Primary | ICD-10-CM

## 2018-12-21 DIAGNOSIS — Z00.00 HEALTHCARE MAINTENANCE: ICD-10-CM

## 2018-12-21 DIAGNOSIS — E11.40 TYPE 2 DIABETES, CONTROLLED, WITH NEUROPATHY: ICD-10-CM

## 2018-12-21 DIAGNOSIS — R53.83 MALAISE AND FATIGUE: Primary | ICD-10-CM

## 2018-12-21 DIAGNOSIS — E87.1 HYPONATREMIA: ICD-10-CM

## 2018-12-21 PROCEDURE — 3288F FALL RISK ASSESSMENT DOCD: CPT | Mod: CPTII,S$GLB,, | Performed by: INTERNAL MEDICINE

## 2018-12-21 PROCEDURE — 99213 OFFICE O/P EST LOW 20 MIN: CPT | Mod: S$GLB,,, | Performed by: INTERNAL MEDICINE

## 2018-12-21 PROCEDURE — 1100F PTFALLS ASSESS-DOCD GE2>/YR: CPT | Mod: CPTII,S$GLB,, | Performed by: INTERNAL MEDICINE

## 2018-12-21 PROCEDURE — 3079F DIAST BP 80-89 MM HG: CPT | Mod: CPTII,S$GLB,, | Performed by: INTERNAL MEDICINE

## 2018-12-21 PROCEDURE — 3044F HG A1C LEVEL LT 7.0%: CPT | Mod: CPTII,S$GLB,, | Performed by: INTERNAL MEDICINE

## 2018-12-21 PROCEDURE — 3077F SYST BP >= 140 MM HG: CPT | Mod: CPTII,S$GLB,, | Performed by: INTERNAL MEDICINE

## 2018-12-21 PROCEDURE — 99499 UNLISTED E&M SERVICE: CPT | Mod: S$GLB,,, | Performed by: INTERNAL MEDICINE

## 2018-12-21 RX ORDER — SILDENAFIL CITRATE 20 MG/1
TABLET ORAL
Qty: 30 TABLET | Refills: 11 | Status: SHIPPED | OUTPATIENT
Start: 2018-12-21 | End: 2019-02-04 | Stop reason: SDUPTHER

## 2018-12-21 RX ORDER — LANOLIN ALCOHOL/MO/W.PET/CERES
400 CREAM (GRAM) TOPICAL DAILY
Refills: 0 | COMMUNITY
Start: 2018-12-21 | End: 2022-11-04 | Stop reason: ALTCHOICE

## 2018-12-21 NOTE — PROGRESS NOTES
Subjective:       Patient ID: Umang Goldberg is a 68 y.o. male.    Chief Complaint: Fatigue (follow up)    HPI         CHIEF COMPLAINT: Fatigue(+).  HPI: wellbutrin helped but wife said he was irritable and so he just stopped it. Taking more time to sleep.     ONSET/TIMING: Onset    6 mo  ago. Sudden: .no . .Similar problems in past? yes     DURATION:       QUALITY/COURSE:      INTENSITY/SEVERITY:.Severity is #       1 (10 point scale)    SYMPTOMS/RELATED:  Possible medication side effects include:     The following symptoms/statements are positive if BOLD, negative otherwise.        CONTEXT/WHEN:.--Fatigued_after_good_night's_rest. Worse_in_evenings.  Worse_after_taking_a_medication.. Similar_problems.    Exposure_to_others_with_similar_symptoms.    MODIFIERS/TREATMENTS:    Exercise.    meds:     REVIEW OF SYMPTOMS:  Anorexia. Depression. Stress. Fever. Headache. Neck_Pain. Lymphadenopathy. Sore_Throat. Rhinorrhea. Coryza. Cough. Chest_Pain. Abdomen_Pain. Nausea. Diarrhea. Urinary_Problems. Rash. Tick_Bites. Weight-gain.  Weight-loss.   Arthralgias . Loss-of-concentration. Loss-of-interests. Disordered-sleep. Cold-intolerance.  Heat-intolerance.  polyuria. polydipsia.                  CHIEF COMPLAINT: Hypertension  HPI: didn't take his meds today.     ONSET:      QUALITY/COURSE:   Unchanged.     INTENSITY/SEVERITY:  Average blood pressure is ? .     MODIFIERS/TREATMENTS:  Taking medications: yes. .High sodium intake: no. alcohol: no      The following symptoms are positive only if BOLDED, otherwise are negative.      SYMPTOMS/RELATED: Possible medication side effects include:   Depression..  . Cough. . Constipation.    REVIEW OF SYMPTOMS: . Weight_loss . Weight_gain . Leg_cramps 2x/wk.  .Potency_problems .    TARGET ORGAN DAMAGE:: angina/ prior myocardial infarction, chronic kidney disease, heart failure, left ventricular hypertrophy, peripheral artery disease, prior coronary revascularization, retinopathy, stroke.  "transient ischemic attack.        Review of Systems   Eyes: Negative for visual disturbance.   Respiratory: Negative for chest tightness and shortness of breath.    Cardiovascular: Negative for chest pain and leg swelling.   Neurological: Negative for dizziness, syncope, weakness and headaches.   Psychiatric/Behavioral: Negative for dysphoric mood. The patient is not nervous/anxious.          Objective:      Vitals:    12/21/18 0804   BP: (!) 150/82   Pulse: 74   Resp: 14   Temp: 98.1 °F (36.7 °C)   TempSrc: Axillary   SpO2: 96%   Weight: 96 kg (211 lb 10.3 oz)   Height: 5' 11" (1.803 m)   PainSc: 0-No pain     Physical Exam   Constitutional: He appears well-developed and well-nourished.   Cardiovascular: Normal rate, regular rhythm and normal heart sounds.   Pulmonary/Chest: Effort normal and breath sounds normal.   Abdominal: Soft. There is no tenderness.   Neurological: He is alert.   Psychiatric: He has a normal mood and affect. His behavior is normal. Thought content normal.   Nursing note and vitals reviewed.        Assessment:       1. Malaise and fatigue    2. Erectile dysfunction, unspecified erectile dysfunction type    3. Hypertension associated with diabetes    4. Type 2 diabetes, controlled, with neuropathy    5. Healthcare maintenance    6. Leg cramps    7. Hyponatremia          Plan:       Malaise and fatigue    Erectile dysfunction, unspecified erectile dysfunction type  -     sildenafil (REVATIO) 20 mg Tab; TAKE THREE TABLETS BY MOUTH ONCE DAILY AS NEEDED  Dispense: 30 tablet; Refill: 11    Hypertension associated with diabetes  -     Comprehensive metabolic panel; Future; Expected date: 12/21/2018    Type 2 diabetes, controlled, with neuropathy  -     Comprehensive metabolic panel; Future; Expected date: 12/21/2018  -     Microalbumin/creatinine urine ratio; Future; Expected date: 12/21/2018  -     Hemoglobin A1c; Future; Expected date: 12/21/2018  -     Lipid panel; Future; Expected date: " 12/21/2018    Healthcare maintenance  -     Fecal Immunochemical Test (iFOBT); Future; Expected date: 01/04/2019  -     Ambulatory referral to Optometry    Leg cramps  -     Comprehensive metabolic panel; Future; Expected date: 12/21/2018  -     magnesium oxide (MAG-OX) 400 mg (241.3 mg magnesium) tablet; Take 1 tablet (400 mg total) by mouth once daily.; Refill: 0    Hyponatremia  -     Comprehensive metabolic panel; Future; Expected date: 12/21/2018      Follow-up in about 3 months (around 3/21/2019).

## 2018-12-21 NOTE — PATIENT INSTRUCTIONS
Lose 5 pounds.  Suggest [place] diet    Avoid white carbohydrates.   Eat  a palm sized protein with each meal.       Walk for 10 minutes after you eat.  This will keep your sugars from going high at that time.    Do not drink extra water other than according to thirst.    For your blood pressure check do not smoke or drink coffee before hand.  Do take your medicine however.

## 2018-12-26 DIAGNOSIS — E11.628 TYPE 2 DIABETES, CONTROLLED, WITH CELLULITIS OF FOOT: ICD-10-CM

## 2018-12-26 DIAGNOSIS — L03.119 TYPE 2 DIABETES, CONTROLLED, WITH CELLULITIS OF FOOT: ICD-10-CM

## 2018-12-26 RX ORDER — METFORMIN HYDROCHLORIDE 1000 MG/1
TABLET ORAL
Qty: 90 TABLET | Refills: 0 | Status: SHIPPED | OUTPATIENT
Start: 2018-12-26 | End: 2019-02-07 | Stop reason: SDUPTHER

## 2018-12-28 ENCOUNTER — CLINICAL SUPPORT (OUTPATIENT)
Dept: FAMILY MEDICINE | Facility: CLINIC | Age: 68
End: 2018-12-28
Payer: MEDICARE

## 2018-12-28 VITALS — HEART RATE: 64 BPM | DIASTOLIC BLOOD PRESSURE: 68 MMHG | SYSTOLIC BLOOD PRESSURE: 134 MMHG

## 2018-12-28 NOTE — PROGRESS NOTES
Last BP was 12/21/18 150/82. Last dose of BP meds at 715am today. Patient does not have a monitor to check blood pressure at home. Today's BP is 134/68 with a pulse of 64.

## 2019-01-22 ENCOUNTER — INITIAL CONSULT (OUTPATIENT)
Dept: OPTOMETRY | Facility: CLINIC | Age: 69
End: 2019-01-22
Payer: MEDICARE

## 2019-01-22 DIAGNOSIS — H52.7 REFRACTIVE ERROR: ICD-10-CM

## 2019-01-22 DIAGNOSIS — Z96.1 PSEUDOPHAKIA: ICD-10-CM

## 2019-01-22 DIAGNOSIS — E11.9 DIABETES MELLITUS WITHOUT OPHTHALMIC MANIFESTATIONS: Primary | ICD-10-CM

## 2019-01-22 PROCEDURE — 92014 PR EYE EXAM, EST PATIENT,COMPREHESV: ICD-10-PCS | Mod: S$GLB,,, | Performed by: OPTOMETRIST

## 2019-01-22 PROCEDURE — 99999 PR PBB SHADOW E&M-EST. PATIENT-LVL II: ICD-10-PCS | Mod: PBBFAC,,, | Performed by: OPTOMETRIST

## 2019-01-22 PROCEDURE — 99999 PR PBB SHADOW E&M-EST. PATIENT-LVL II: CPT | Mod: PBBFAC,,, | Performed by: OPTOMETRIST

## 2019-01-22 PROCEDURE — 92014 COMPRE OPH EXAM EST PT 1/>: CPT | Mod: S$GLB,,, | Performed by: OPTOMETRIST

## 2019-01-22 NOTE — PROGRESS NOTES
HPI     Presenting Complaint: Pt here for yearly diabetic eye exam . Pt states   vision has been stable.     Lab Results       Component                Value               Date                       HGBA1C                   5.4                 09/17/2018                 HGBA1C                   5.3                 03/20/2018                 HGBA1C                   5.3                 11/06/2017              Ophthalmic medication / drops: None    (-) headaches  (-) diplopia   (-) flashes / (-) floaters      Last edited by Andrey Lucas, OD on 1/22/2019 10:37 AM. (History)            Assessment /Plan     For exam results, see Encounter Report.    Diabetes mellitus without ophthalmic manifestations    Pseudophakia    Refractive error      DM type 2 w/o ocular retinopathy OU. Discussed possible ocular affects of uncontrolled blood sugar with patient. Recommended continued strong blood sugar control and continued care with PCP. Monitor yearly.     S/p cataract extraction OU with good results. Pt happy with uncorrected distance vision and otc readers prn for near. Denies refraction. Return prn for spec rx.      RTC in 1 year for comprehensive eye exam, or sooner prn.

## 2019-01-22 NOTE — LETTER
January 22, 2019      Jc Rey MD  [address]  [place] [address]           [facility] - Optometry  [address] [place] [address] Drive [address]  [facility] [address]  Phone: [phone]          Patient: Umang Goldberg   MR Number: [id number]   YOB: 1950   Date of Visit: 1/22/2019       Dear Dr. Jc Rey:    Thank you for referring Umang Goldberg to me for evaluation. Attached you will find relevant portions of my assessment and plan of care.    If you have questions, please do not hesitate to call me. I look forward to following Umang Goldberg along with you.    Sincerely,    Andrey Lucas, OD    Enclosure  CC:  No Recipients    If you would like to receive this communication electronically, please contact [email or web address] or [phone] to request more information on [facility] access.    For providers and/or their staff who would like to refer a patient to [facility], please contact us through our one-stop-shop provider referral line, Kana Maria, at [phone].    If you feel you have received this communication in error or would no longer like to receive these types of communications, please e-mail [email or web address]

## 2019-01-29 ENCOUNTER — CLINICAL SUPPORT (OUTPATIENT)
Dept: SMOKING CESSATION | Facility: CLINIC | Age: 69
End: 2019-01-29
Payer: COMMERCIAL

## 2019-01-29 DIAGNOSIS — F17.200 NICOTINE DEPENDENCE: Primary | ICD-10-CM

## 2019-01-29 PROCEDURE — 99407 PR TOBACCO USE CESSATION INTENSIVE >10 MINUTES: ICD-10-PCS | Mod: S$GLB,,, | Performed by: INTERNAL MEDICINE

## 2019-01-29 PROCEDURE — 99407 BEHAV CHNG SMOKING > 10 MIN: CPT | Mod: S$GLB,,, | Performed by: INTERNAL MEDICINE

## 2019-01-29 NOTE — PROGRESS NOTES
Spoke with patient today in regard to smoking cessation progress for 3/6 month telephone follow up, he states not tobacco free. Patient states not interested in the program due to Chantix not being offered for free through the program. Informed patient of benefit period, future follow up, and contact information if any further help or support is needed. Will complete smart form for 3 and 6 month follow up on Quit attempt #2.

## 2019-02-04 DIAGNOSIS — N52.9 ERECTILE DYSFUNCTION, UNSPECIFIED ERECTILE DYSFUNCTION TYPE: ICD-10-CM

## 2019-02-04 RX ORDER — SILDENAFIL CITRATE 20 MG/1
TABLET ORAL
Qty: 30 TABLET | Refills: 11 | Status: SHIPPED | OUTPATIENT
Start: 2019-02-04 | End: 2019-06-24 | Stop reason: SDUPTHER

## 2019-02-04 NOTE — TELEPHONE ENCOUNTER
----- Message from Annabella Hearn sent at 2019  1:21 PM CST -----  Contact: Umang  Type:  RX Refill Request    Who Called:  patient  Refill or New Rx:  Refill   RX Name and Strength:  sildenafil (REVATIO) 20 mg Tab  How is the patient currently taking it? (ex. 1XDay):  TAKE THREE TABLETS BY MOUTH ONCE DAILY AS NEEDED  Is this a 30 day or 90 day RX:  30  Preferred Pharmacy with phone number:    [facility] [id number] [facility] [place] - [address] [place] [address]  [place] [address]  Phone: [phone] Fax: [phone]  Local or Mail Order:  local  Ordering Provider:  Dr Jc Rey  [facility] Call Back Number:  [phone]  Additional Information:   Calling as pharmacy is telling him the Rx is . Please call patient when sent to pharmacy. Thanks!

## 2019-02-07 DIAGNOSIS — L03.119 TYPE 2 DIABETES, CONTROLLED, WITH CELLULITIS OF FOOT: ICD-10-CM

## 2019-02-07 DIAGNOSIS — E11.628 TYPE 2 DIABETES, CONTROLLED, WITH CELLULITIS OF FOOT: ICD-10-CM

## 2019-02-07 RX ORDER — METFORMIN HYDROCHLORIDE 1000 MG/1
TABLET ORAL
Qty: 90 TABLET | Refills: 0 | Status: SHIPPED | OUTPATIENT
Start: 2019-02-07 | End: 2019-03-26 | Stop reason: SDUPTHER

## 2019-02-11 PROCEDURE — 82274 ASSAY TEST FOR BLOOD FECAL: CPT

## 2019-02-14 ENCOUNTER — LAB VISIT (OUTPATIENT)
Dept: LAB | Facility: HOSPITAL | Age: 69
End: 2019-02-14
Attending: INTERNAL MEDICINE
Payer: MEDICARE

## 2019-02-14 DIAGNOSIS — Z00.00 HEALTHCARE MAINTENANCE: ICD-10-CM

## 2019-02-14 LAB — HEMOCCULT STL QL IA: NEGATIVE

## 2019-03-15 ENCOUNTER — CLINICAL SUPPORT (OUTPATIENT)
Dept: FAMILY MEDICINE | Facility: CLINIC | Age: 69
End: 2019-03-15
Payer: MEDICARE

## 2019-03-15 DIAGNOSIS — E11.40 TYPE 2 DIABETES, CONTROLLED, WITH NEUROPATHY: ICD-10-CM

## 2019-03-15 DIAGNOSIS — R25.2 LEG CRAMPS: ICD-10-CM

## 2019-03-15 DIAGNOSIS — E11.59 HYPERTENSION ASSOCIATED WITH DIABETES: ICD-10-CM

## 2019-03-15 DIAGNOSIS — I15.2 HYPERTENSION ASSOCIATED WITH DIABETES: ICD-10-CM

## 2019-03-15 DIAGNOSIS — E87.1 HYPONATREMIA: ICD-10-CM

## 2019-03-15 LAB
ALBUMIN SERPL BCP-MCNC: 4 G/DL
ALP SERPL-CCNC: 56 U/L
ALT SERPL W/O P-5'-P-CCNC: 21 U/L
ANION GAP SERPL CALC-SCNC: 9 MMOL/L
AST SERPL-CCNC: 18 U/L
BILIRUB SERPL-MCNC: 0.5 MG/DL
BUN SERPL-MCNC: 10 MG/DL
CALCIUM SERPL-MCNC: 9.6 MG/DL
CHLORIDE SERPL-SCNC: 98 MMOL/L
CHOLEST SERPL-MCNC: 124 MG/DL
CHOLEST/HDLC SERPL: 3.2 {RATIO}
CO2 SERPL-SCNC: 27 MMOL/L
CREAT SERPL-MCNC: 0.9 MG/DL
EST. GFR  (AFRICAN AMERICAN): >60 ML/MIN/1.73 M^2
EST. GFR  (NON AFRICAN AMERICAN): >60 ML/MIN/1.73 M^2
GLUCOSE SERPL-MCNC: 114 MG/DL
HDLC SERPL-MCNC: 39 MG/DL
HDLC SERPL: 31.5 %
LDLC SERPL CALC-MCNC: 54.8 MG/DL
NONHDLC SERPL-MCNC: 85 MG/DL
POTASSIUM SERPL-SCNC: 4.8 MMOL/L
PROT SERPL-MCNC: 6.9 G/DL
SODIUM SERPL-SCNC: 134 MMOL/L
TRIGL SERPL-MCNC: 151 MG/DL

## 2019-03-15 PROCEDURE — 80061 LIPID PANEL: CPT

## 2019-03-15 PROCEDURE — 80053 COMPREHEN METABOLIC PANEL: CPT

## 2019-03-15 PROCEDURE — 82043 UR ALBUMIN QUANTITATIVE: CPT

## 2019-03-15 PROCEDURE — 83036 HEMOGLOBIN GLYCOSYLATED A1C: CPT

## 2019-03-15 NOTE — PROGRESS NOTES
Identified patient by name and .  Specimen collected with 23g butterfly using aseptic technqiue. Patient tolerated well.

## 2019-03-16 LAB
ALBUMIN/CREAT UR: 56.9 UG/MG
CREAT UR-MCNC: 65 MG/DL
ESTIMATED AVG GLUCOSE: 108 MG/DL
HBA1C MFR BLD HPLC: 5.4 %
MICROALBUMIN UR DL<=1MG/L-MCNC: 37 UG/ML

## 2019-03-18 NOTE — PROGRESS NOTES
Labs are normal except her sodium remains just slightly low.  Drink water according to thirst.  Do not try to eat drink extra unless you are  in the heat.

## 2019-03-22 ENCOUNTER — OFFICE VISIT (OUTPATIENT)
Dept: FAMILY MEDICINE | Facility: CLINIC | Age: 69
End: 2019-03-22
Payer: MEDICARE

## 2019-03-22 VITALS
HEIGHT: 71 IN | WEIGHT: 214.5 LBS | RESPIRATION RATE: 16 BRPM | BODY MASS INDEX: 30.03 KG/M2 | SYSTOLIC BLOOD PRESSURE: 134 MMHG | DIASTOLIC BLOOD PRESSURE: 72 MMHG | OXYGEN SATURATION: 95 % | HEART RATE: 74 BPM

## 2019-03-22 DIAGNOSIS — G47.33 OBSTRUCTIVE SLEEP APNEA: ICD-10-CM

## 2019-03-22 DIAGNOSIS — E11.21 WELL CONTROLLED TYPE 2 DIABETES MELLITUS WITH NEPHROPATHY: Primary | ICD-10-CM

## 2019-03-22 DIAGNOSIS — Z72.0 TOBACCO ABUSE: ICD-10-CM

## 2019-03-22 DIAGNOSIS — E11.69 HYPERLIPIDEMIA ASSOCIATED WITH TYPE 2 DIABETES MELLITUS: ICD-10-CM

## 2019-03-22 DIAGNOSIS — R49.0 HOARSENESS OF VOICE: ICD-10-CM

## 2019-03-22 DIAGNOSIS — I15.2 HYPERTENSION ASSOCIATED WITH DIABETES: ICD-10-CM

## 2019-03-22 DIAGNOSIS — E78.5 HYPERLIPIDEMIA ASSOCIATED WITH TYPE 2 DIABETES MELLITUS: ICD-10-CM

## 2019-03-22 DIAGNOSIS — E11.59 HYPERTENSION ASSOCIATED WITH DIABETES: ICD-10-CM

## 2019-03-22 PROCEDURE — 99214 PR OFFICE/OUTPT VISIT, EST, LEVL IV, 30-39 MIN: ICD-10-PCS | Mod: S$GLB,,, | Performed by: INTERNAL MEDICINE

## 2019-03-22 PROCEDURE — 3075F PR MOST RECENT SYSTOLIC BLOOD PRESS GE 130-139MM HG: ICD-10-PCS | Mod: CPTII,S$GLB,, | Performed by: INTERNAL MEDICINE

## 2019-03-22 PROCEDURE — 3078F PR MOST RECENT DIASTOLIC BLOOD PRESSURE < 80 MM HG: ICD-10-PCS | Mod: CPTII,S$GLB,, | Performed by: INTERNAL MEDICINE

## 2019-03-22 PROCEDURE — 3044F PR MOST RECENT HEMOGLOBIN A1C LEVEL <7.0%: ICD-10-PCS | Mod: CPTII,S$GLB,, | Performed by: INTERNAL MEDICINE

## 2019-03-22 PROCEDURE — 99499 UNLISTED E&M SERVICE: CPT | Mod: S$GLB,,, | Performed by: INTERNAL MEDICINE

## 2019-03-22 PROCEDURE — 1101F PT FALLS ASSESS-DOCD LE1/YR: CPT | Mod: CPTII,S$GLB,, | Performed by: INTERNAL MEDICINE

## 2019-03-22 PROCEDURE — 3044F HG A1C LEVEL LT 7.0%: CPT | Mod: CPTII,S$GLB,, | Performed by: INTERNAL MEDICINE

## 2019-03-22 PROCEDURE — 99499 RISK ADDL DX/OHS AUDIT: ICD-10-PCS | Mod: S$GLB,,, | Performed by: INTERNAL MEDICINE

## 2019-03-22 PROCEDURE — 1101F PR PT FALLS ASSESS DOC 0-1 FALLS W/OUT INJ PAST YR: ICD-10-PCS | Mod: CPTII,S$GLB,, | Performed by: INTERNAL MEDICINE

## 2019-03-22 PROCEDURE — 3078F DIAST BP <80 MM HG: CPT | Mod: CPTII,S$GLB,, | Performed by: INTERNAL MEDICINE

## 2019-03-22 PROCEDURE — 3075F SYST BP GE 130 - 139MM HG: CPT | Mod: CPTII,S$GLB,, | Performed by: INTERNAL MEDICINE

## 2019-03-22 PROCEDURE — 99214 OFFICE O/P EST MOD 30 MIN: CPT | Mod: S$GLB,,, | Performed by: INTERNAL MEDICINE

## 2019-03-22 RX ORDER — VARENICLINE TARTRATE 0.5 (11)-1
KIT ORAL
Qty: 1 PACKAGE | Refills: 0 | Status: SHIPPED | OUTPATIENT
Start: 2019-03-22 | End: 2019-04-23 | Stop reason: SDUPTHER

## 2019-03-22 RX ORDER — VARENICLINE TARTRATE 1 MG/1
1 TABLET, FILM COATED ORAL 2 TIMES DAILY
Qty: 60 TABLET | Refills: 5 | Status: SHIPPED | OUTPATIENT
Start: 2019-03-22 | End: 2019-04-21

## 2019-03-22 NOTE — PATIENT INSTRUCTIONS
Lose 5 pounds.  Suggest [place] diet    Avoid white carbohydrates.   Eat  a palm sized protein with each meal.       Walk for 10 minutes after you eat.  This will keep your sugars from going high at that time.    Quit smoking after you  used the starter pack and when use start the regular dose of Chantix

## 2019-03-22 NOTE — PROGRESS NOTES
Subjective:       Patient ID: Umang Goldberg is a 68 y.o. male.    Chief Complaint: Hyperlipidemia (labs)    HPI     Snoring at night, bothersome.  Falls asleep while watching television.     History paroxysmal atrial fibrillation on Eliquis.  No bleeding.    CHIEF COMPLAINT: Hypertension  HPI:     ONSET:      QUALITY/COURSE:   Unchanged.     INTENSITY/SEVERITY:  Average blood pressure is 125/70.     MODIFIERS/TREATMENTS:  Taking medications: yes. .High sodium intake: no. alcohol: no      The following symptoms are positive only if BOLDED, otherwise are negative.      SYMPTOMS/RELATED: Possible medication side effects include:   Depression..  . Cough. . Constipation.    REVIEW OF SYMPTOMS: . Weight_loss . Weight_gain . Leg_cramps .Potency_problems .    TARGET ORGAN DAMAGE:: angina/ prior myocardial infarction, chronic kidney disease, heart failure, left ventricular hypertrophy, peripheral artery disease, prior coronary revascularization, retinopathy, stroke. transient ischemic attack.        CHIEF COMPLAINT: Diabetes.  HPI:     ONSET/TIMING:     QUALITY/COURSE:   unchanged..      INTENSITY/SEVERITY: . Measures blood sugar :  3 times per day.        Lowest recent . Average .     CONTEXT/WHEN: last HgbA1c    Lab Results   Component Value Date    HGBA1C 5.4 03/15/2019      weights:    Wt Readings from Last 1 Encounters:   03/22/19 0811 97.3 kg (214 lb 8.1 oz)         SYMPTOMS/RELATED: . . Possible medication side effects include:     The following symptoms/statements  are present IF IN BOLD, negative otherwise.         MODIFIERS/TREATMENTS: Not_taking_medications:  .  Non-compliance_with_Diabetic_diet  .  No_eye_exam_within_last_year.  Not_practicing_good_foot_care.    REVIEW OF SYMPTOMS: . Weight_gain. Weight_loss. Neuropathy. Recurrent_infections. Skin_ulcers      CHIEF COMPLAINT: Hyperlipidemia. cholesterol screening: no.   HPI:     ONSET:    MODIFIERS/TREATMENTS: . Taking medications: yes. .  "Non-compliance with following diet: no. .     SYMPTOMS/RELATED:Possible medication side effects include:   Myalgia: no.  .     REVIEW OF SYMPTOMS: past weights:   Wt Readings from Last 1 Encounters:   03/22/19 0811 97.3 kg (214 lb 8.1 oz)                                                     Last lipids: total   Lab Results   Component Value Date    CHOL 124 03/15/2019    CHOL 109 (L) 12/17/2018    CHOL 121 09/17/2018                                                                     HDL   Lab Results   Component Value Date    HDL 39 (L) 03/15/2019    HDL 38 (L) 12/17/2018    HDL 42 09/17/2018                                                                     LDL   Lab Results   Component Value Date    LDLCALC 54.8 (L) 03/15/2019    LDLCALC 36.8 (L) 12/17/2018    LDLCALC 49.6 (L) 09/17/2018                                                                     TRIG   Lab Results   Component Value Date    TRIG 151 (H) 03/15/2019    TRIG 171 (H) 12/17/2018    TRIG 147 09/17/2018                                                                     scatchy throat for 1 wk.  Hx of polyps on larynx 20 y ago.      Review of Systems   Constitutional: Negative for diaphoresis, fatigue and unexpected weight change.   Eyes: Negative for visual disturbance.   Respiratory: Negative for chest tightness and shortness of breath.    Cardiovascular: Negative for chest pain and leg swelling.   Endocrine: Negative for polyphagia and polyuria.   Neurological: Negative for dizziness, syncope, weakness, numbness and headaches.   Psychiatric/Behavioral: Negative for dysphoric mood. The patient is not nervous/anxious.          Objective:      Vitals:    03/22/19 0811   BP: 134/72   Pulse: 74   Resp: 16   SpO2: 95%   Weight: 97.3 kg (214 lb 8.1 oz)   Height: 5' 11" (1.803 m)   PainSc: 0-No pain     Physical Exam   Constitutional: He appears well-developed and well-nourished.   Cardiovascular: Normal rate, regular rhythm and normal heart sounds. "   Pulmonary/Chest: Effort normal and breath sounds normal.   Abdominal: Soft. There is no tenderness.   Neurological: He is alert.   Psychiatric: He has a normal mood and affect. His behavior is normal. Thought content normal.   Nursing note and vitals reviewed.      alb/cr ratio 59  Assessment:       No diagnosis found.      Plan:       There are no diagnoses linked to this encounter.  No Follow-up on file.

## 2019-03-26 DIAGNOSIS — L03.119 TYPE 2 DIABETES, CONTROLLED, WITH CELLULITIS OF FOOT: ICD-10-CM

## 2019-03-26 DIAGNOSIS — E11.628 TYPE 2 DIABETES, CONTROLLED, WITH CELLULITIS OF FOOT: ICD-10-CM

## 2019-03-26 NOTE — TELEPHONE ENCOUNTER
----- Message from Sharon Huff sent at 3/26/2019 10:48 AM CDT -----  Contact: Patient  Type: Needs Medical Advice    Who Called:  Mr. Umang Pena Call Back Number:   Additional Information: Patient is calling to inform Dr. Rey of the Medicaid program's number where he can get the PA done for his varenicline (CHANTIX) 1 mg Tab. Number is  [phone].Please advise.

## 2019-03-28 ENCOUNTER — TELEPHONE (OUTPATIENT)
Dept: FAMILY MEDICINE | Facility: CLINIC | Age: 69
End: 2019-03-28

## 2019-03-28 NOTE — TELEPHONE ENCOUNTER
----- Message from Екатерина Pichardo sent at 3/28/2019  1:50 PM CDT -----  Contact: [facility]  RX Chantix and have questions about prior authorization for it and please advise [phone] and fax # [phone]

## 2019-04-01 ENCOUNTER — TELEPHONE (OUTPATIENT)
Dept: FAMILY MEDICINE | Facility: CLINIC | Age: 69
End: 2019-04-01

## 2019-04-01 RX ORDER — METFORMIN HYDROCHLORIDE 1000 MG/1
TABLET ORAL
Qty: 90 TABLET | Refills: 0 | Status: SHIPPED | OUTPATIENT
Start: 2019-04-01 | End: 2019-05-15 | Stop reason: SDUPTHER

## 2019-04-23 DIAGNOSIS — Z72.0 TOBACCO ABUSE: ICD-10-CM

## 2019-04-23 RX ORDER — VARENICLINE TARTRATE 0.5 (11)-1
KIT ORAL
Qty: 1 PACKAGE | Refills: 0 | Status: SHIPPED | OUTPATIENT
Start: 2019-04-23 | End: 2020-06-18

## 2019-05-08 RX ORDER — PROPAFENONE HYDROCHLORIDE 150 MG/1
150 TABLET, COATED ORAL 2 TIMES DAILY
Qty: 180 TABLET | Refills: 0 | Status: SHIPPED | OUTPATIENT
Start: 2019-05-08 | End: 2019-08-01 | Stop reason: SDUPTHER

## 2019-05-15 DIAGNOSIS — E11.628 TYPE 2 DIABETES, CONTROLLED, WITH CELLULITIS OF FOOT: ICD-10-CM

## 2019-05-15 DIAGNOSIS — L03.119 TYPE 2 DIABETES, CONTROLLED, WITH CELLULITIS OF FOOT: ICD-10-CM

## 2019-05-15 RX ORDER — METFORMIN HYDROCHLORIDE 1000 MG/1
TABLET ORAL
Qty: 90 TABLET | Refills: 0 | Status: SHIPPED | OUTPATIENT
Start: 2019-05-15 | End: 2019-06-28 | Stop reason: SDUPTHER

## 2019-05-27 ENCOUNTER — OFFICE VISIT (OUTPATIENT)
Dept: PULMONOLOGY | Facility: CLINIC | Age: 69
End: 2019-05-27
Payer: MEDICARE

## 2019-05-27 VITALS
HEART RATE: 65 BPM | BODY MASS INDEX: 29.02 KG/M2 | WEIGHT: 207.25 LBS | OXYGEN SATURATION: 97 % | DIASTOLIC BLOOD PRESSURE: 73 MMHG | SYSTOLIC BLOOD PRESSURE: 140 MMHG | HEIGHT: 71 IN

## 2019-05-27 DIAGNOSIS — Z77.090 ASBESTOS EXPOSURE: ICD-10-CM

## 2019-05-27 DIAGNOSIS — J44.9 CHRONIC OBSTRUCTIVE PULMONARY DISEASE, UNSPECIFIED COPD TYPE: Primary | ICD-10-CM

## 2019-05-27 DIAGNOSIS — Z72.0 TOBACCO ABUSE: ICD-10-CM

## 2019-05-27 DIAGNOSIS — R09.89 CHRONIC SINUS COMPLAINTS: ICD-10-CM

## 2019-05-27 PROCEDURE — 3077F PR MOST RECENT SYSTOLIC BLOOD PRESSURE >= 140 MM HG: ICD-10-PCS | Mod: CPTII,S$GLB,, | Performed by: INTERNAL MEDICINE

## 2019-05-27 PROCEDURE — 1100F PTFALLS ASSESS-DOCD GE2>/YR: CPT | Mod: CPTII,S$GLB,, | Performed by: INTERNAL MEDICINE

## 2019-05-27 PROCEDURE — 3078F PR MOST RECENT DIASTOLIC BLOOD PRESSURE < 80 MM HG: ICD-10-PCS | Mod: CPTII,S$GLB,, | Performed by: INTERNAL MEDICINE

## 2019-05-27 PROCEDURE — 99205 OFFICE O/P NEW HI 60 MIN: CPT | Mod: S$GLB,,, | Performed by: INTERNAL MEDICINE

## 2019-05-27 PROCEDURE — 3288F FALL RISK ASSESSMENT DOCD: CPT | Mod: CPTII,S$GLB,, | Performed by: INTERNAL MEDICINE

## 2019-05-27 PROCEDURE — 99205 PR OFFICE/OUTPT VISIT, NEW, LEVL V, 60-74 MIN: ICD-10-PCS | Mod: S$GLB,,, | Performed by: INTERNAL MEDICINE

## 2019-05-27 PROCEDURE — 99999 PR PBB SHADOW E&M-EST. PATIENT-LVL IV: CPT | Mod: PBBFAC,,, | Performed by: INTERNAL MEDICINE

## 2019-05-27 PROCEDURE — 99999 PR PBB SHADOW E&M-EST. PATIENT-LVL IV: ICD-10-PCS | Mod: PBBFAC,,, | Performed by: INTERNAL MEDICINE

## 2019-05-27 PROCEDURE — 99499 UNLISTED E&M SERVICE: CPT | Mod: S$GLB,,, | Performed by: INTERNAL MEDICINE

## 2019-05-27 PROCEDURE — 3078F DIAST BP <80 MM HG: CPT | Mod: CPTII,S$GLB,, | Performed by: INTERNAL MEDICINE

## 2019-05-27 PROCEDURE — 3077F SYST BP >= 140 MM HG: CPT | Mod: CPTII,S$GLB,, | Performed by: INTERNAL MEDICINE

## 2019-05-27 PROCEDURE — 3288F PR FALLS RISK ASSESSMENT DOCUMENTED: ICD-10-PCS | Mod: CPTII,S$GLB,, | Performed by: INTERNAL MEDICINE

## 2019-05-27 PROCEDURE — 99499 RISK ADDL DX/OHS AUDIT: ICD-10-PCS | Mod: S$GLB,,, | Performed by: INTERNAL MEDICINE

## 2019-05-27 PROCEDURE — 1100F PR PT FALLS ASSESS DOC 2+ FALLS/FALL W/INJURY/YR: ICD-10-PCS | Mod: CPTII,S$GLB,, | Performed by: INTERNAL MEDICINE

## 2019-05-27 RX ORDER — BUDESONIDE AND FORMOTEROL FUMARATE DIHYDRATE 160; 4.5 UG/1; UG/1
2 AEROSOL RESPIRATORY (INHALATION) EVERY 12 HOURS
Qty: 1 INHALER | Refills: 11 | Status: SHIPPED | OUTPATIENT
Start: 2019-05-27 | End: 2020-09-25

## 2019-05-27 RX ORDER — VARENICLINE TARTRATE 1 MG/1
TABLET, FILM COATED ORAL
COMMUNITY
Start: 2019-05-22 | End: 2020-06-18

## 2019-05-27 RX ORDER — FLUTICASONE PROPIONATE 50 MCG
1 SPRAY, SUSPENSION (ML) NASAL DAILY
Qty: 1 BOTTLE | Refills: 11 | Status: SHIPPED | OUTPATIENT
Start: 2019-05-27 | End: 2020-06-18

## 2019-05-27 NOTE — PATIENT INSTRUCTIONS
Wheezes heard on exam- copd  Present- likley not bothering due to back limits.      Snoring noted - any stopping breathing?    epworth score not bad - would not recommend pursuing sleep test unless apnea noted by wife.  Would check 02 level sleeping.    Chronic sinus problems-Use afrin (over the counter) at bedtime if stuffy followed in 10 minutes by Flonase should make breathing better and may decrease snoring.      Asbestos exposure- check chest xray- could do ct chest if you wish to screen for lung cancer-- best to stop smokes as you are trying..    Try Symbicort consider more breathing medications if helps dramatic.    Breathing test may help as some wheezes are heard on exam.    cxr in 2017 shows copd- would repeat.      Will get back over phone,  Could get sleep test if reports of stopped breathing- call is so.  Call if wish for nebulizer/breathing treatments.

## 2019-05-27 NOTE — LETTER
May 27, 2019      Jc Rey MD  [address]  [place] [address]           [facility]  [address]  [place] [address]  Phone: [phone]  Fax: [phone]          Patient: Umang Goldberg   MR Number: [id number]   YOB: 1950   Date of Visit: 5/27/2019       Dear Dr. Jc Rey:    Thank you for referring Umang Goldberg to me for evaluation. Attached you will find relevant portions of my assessment and plan of care.    If you have questions, please do not hesitate to call me. I look forward to following Umang Goldberg along with you.    Sincerely,    Dwight Samuels MD    Enclosure  CC:  No Recipients    If you would like to receive this communication electronically, please contact [email or web address] or [phone] to request more information on [employer] Link access.    For providers and/or their staff who would like to refer a patient to [facility], please contact us through our one-stop-shop provider referral line, [facility], at [phone].    If you feel you have received this communication in error or would no longer like to receive these types of communications, please e-mail [email or web address]

## 2019-05-27 NOTE — PROGRESS NOTES
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  epworth 4. Wife says snoring worse after occasional drinks.     Had chr nasal stuffiness - uses saline prn.      Served in [employer].      Uses cane to ambulate - had chr back and right leg problems.  Gets injections occ.  \    No lung c/o    The chief compliant  problem is new to me",   PFSH:  Past Medical History:   Diagnosis Date    A-fib 1 year    Diabetes mellitus          Past Surgical History:   Procedure Laterality Date    BACK SURGERY      EYE SURGERY      HERNIA REPAIR       Social History     Tobacco Use    Smoking status: Current Every Day Smoker     Types: Vaping with nicotine    Smokeless tobacco: Never Used   Substance Use Topics    Alcohol use: Yes    Drug use: Yes     Types: Hydrocodone     Family History   Problem Relation Age of Onset    Skin cancer Father     Psoriasis Daughter     Glaucoma Neg Hx     Macular degeneration Neg Hx     Retinal detachment Neg Hx     Melanoma Neg Hx     Lupus Neg Hx     Eczema Neg Hx      Review of patient's allergies indicates:   Allergen Reactions    Contrast media        Performance Status:The patient's activity level is mobility with asit devices.      Review of Systems:  a review of eleven systems covering constitutional, Eye, HEENT, Psych, Respiratory, Cardiac, GI, , Musculoskeletal, Endocrine, Dermatologic was negative except for pertinent findings as listed ABOVE and below:  pertinent positive as above, rest is good       Exam:Comprehensive exam done. BP (!) 140/73 (BP Location: Right arm, Patient Position: Sitting)   Pulse 65   Ht 5' 11" (1.803 m)   Wt 94 kg (207 lb 3.7 oz)   SpO2 97% Comment: on room air  BMI 28.90 kg/m²   Exam included Vitals as listed, and patient's appearance and affect and alertness and mood, " oral exam for yeast and hygiene and pharynx lesions and Mallapatti (M) score, neck with inspection for jvd and masses and thyroid abnormalities and lymph nodes (supraclavicular and infraclavicular nodes and axillary also examined and noted if abn), chest exam included symmetry and effort and fremitus and percussion and auscultation, cardiac exam included rhythm and gallops and murmur and rubs and jvd and edema, abdominal exam for mass and hepatosplenomegaly and tenderness and hernias and bowel sounds, Musculoskeletal exam with muscle tone and posture and mobility/gait and  strength, and skin for rashes and cyanosis and pallor and turgor, extremity for clubbing.  Findings were normal except for pertinent findings listed below:   chest is symmetric, no distress, normal percussion, normal fremitus and sl rhonchi, M3, no edema    Radiographs (ct chest and cxr) reviewed: view by direct vision  11/14/17 cxr  Some copd.    Labs reviewed           PFT was not done       Plan:  Clinical impression is apparently straight forward and impression with management as below.    Umang was seen today for apnea and shortness of breath.    Diagnoses and all orders for this visit:    Chronic obstructive pulmonary disease, unspecified COPD type  -     PULSE OXIMETRY OVERNIGHT  -     budesonide-formoterol 160-4.5 mcg (SYMBICORT) 160-4.5 mcg/actuation HFAA; Inhale 2 puffs into the lungs every 12 (twelve) hours. Controller  -     X-Ray Chest PA And Lateral; Future  -     Spirometry with/without bronchodilator; Future    Asbestos exposure  -     X-Ray Chest PA And Lateral; Future    Chronic sinus complaints  -     fluticasone propionate (FLONASE) 50 mcg/actuation nasal spray; 1 spray (50 mcg total) by Each Nare route once daily.        Follow up in about 1 year (around 5/27/2020), or if symptoms worsen or fail to improve.    Discussed with patient above for education the following:      Patient Instructions   Wheezes heard on exam- copd   Present- likley not bothering due to back limits.      Snoring noted - any stopping breathing?    epworth score not bad - would not recommend pursuing sleep test unless apnea noted by wife.  Would check 02 level sleeping.    Chronic sinus problems-Use afrin (over the counter) at bedtime if stuffy followed in 10 minutes by Flonase should make breathing better and may decrease snoring.      Asbestos exposure- check chest xray- could do ct chest if you wish to screen for lung cancer-- best to stop smokes as you are trying..    Try Symbicort consider more breathing medications if helps dramatic.    Breathing test may help as some wheezes are heard on exam.    cxr in 2017 shows copd- would repeat.      Will get back over phone,  Could get sleep test if reports of stopped breathing- call is so.  Call if wish for nebulizer/breathing treatments.

## 2019-06-10 ENCOUNTER — PATIENT OUTREACH (OUTPATIENT)
Dept: ADMINISTRATIVE | Facility: HOSPITAL | Age: 69
End: 2019-06-10

## 2019-06-17 ENCOUNTER — CLINICAL SUPPORT (OUTPATIENT)
Dept: FAMILY MEDICINE | Facility: CLINIC | Age: 69
End: 2019-06-17
Payer: MEDICARE

## 2019-06-17 DIAGNOSIS — I15.2 HYPERTENSION ASSOCIATED WITH DIABETES: ICD-10-CM

## 2019-06-17 DIAGNOSIS — E11.21 WELL CONTROLLED TYPE 2 DIABETES MELLITUS WITH NEPHROPATHY: ICD-10-CM

## 2019-06-17 DIAGNOSIS — E11.69 HYPERLIPIDEMIA ASSOCIATED WITH TYPE 2 DIABETES MELLITUS: ICD-10-CM

## 2019-06-17 DIAGNOSIS — E78.5 HYPERLIPIDEMIA ASSOCIATED WITH TYPE 2 DIABETES MELLITUS: ICD-10-CM

## 2019-06-17 DIAGNOSIS — E11.59 HYPERTENSION ASSOCIATED WITH DIABETES: ICD-10-CM

## 2019-06-17 DIAGNOSIS — D64.9 ANEMIA, UNSPECIFIED TYPE: Primary | ICD-10-CM

## 2019-06-17 LAB
ALBUMIN SERPL BCP-MCNC: 3.9 G/DL (ref 3.5–5.2)
ALP SERPL-CCNC: 48 U/L (ref 55–135)
ALT SERPL W/O P-5'-P-CCNC: 20 U/L (ref 10–44)
ANION GAP SERPL CALC-SCNC: 9 MMOL/L (ref 8–16)
AST SERPL-CCNC: 16 U/L (ref 10–40)
BASOPHILS # BLD AUTO: 0 K/UL (ref 0–0.2)
BASOPHILS NFR BLD: 0.5 % (ref 0–1.9)
BILIRUB SERPL-MCNC: 0.3 MG/DL (ref 0.1–1)
BUN SERPL-MCNC: 13 MG/DL (ref 8–23)
CALCIUM SERPL-MCNC: 9.6 MG/DL (ref 8.7–10.5)
CHLORIDE SERPL-SCNC: 103 MMOL/L (ref 95–110)
CHOLEST SERPL-MCNC: 141 MG/DL (ref 120–199)
CHOLEST/HDLC SERPL: 3.9 {RATIO} (ref 2–5)
CO2 SERPL-SCNC: 27 MMOL/L (ref 23–29)
CREAT SERPL-MCNC: 0.9 MG/DL (ref 0.5–1.4)
DIFFERENTIAL METHOD: ABNORMAL
EOSINOPHIL # BLD AUTO: 0.2 K/UL (ref 0–0.5)
EOSINOPHIL NFR BLD: 3.2 % (ref 0–8)
ERYTHROCYTE [DISTWIDTH] IN BLOOD BY AUTOMATED COUNT: 13.4 % (ref 11.5–14.5)
EST. GFR  (AFRICAN AMERICAN): >60 ML/MIN/1.73 M^2
EST. GFR  (NON AFRICAN AMERICAN): >60 ML/MIN/1.73 M^2
ESTIMATED AVG GLUCOSE: 120 MG/DL (ref 68–131)
GLUCOSE SERPL-MCNC: 123 MG/DL (ref 70–110)
HBA1C MFR BLD HPLC: 5.8 % (ref 4–5.6)
HCT VFR BLD AUTO: 41.4 % (ref 40–54)
HDLC SERPL-MCNC: 36 MG/DL (ref 40–75)
HDLC SERPL: 25.5 % (ref 20–50)
HGB BLD-MCNC: 13.9 G/DL (ref 14–18)
LDLC SERPL CALC-MCNC: 55.6 MG/DL (ref 63–159)
LYMPHOCYTES # BLD AUTO: 1.6 K/UL (ref 1–4.8)
LYMPHOCYTES NFR BLD: 22.4 % (ref 18–48)
MCH RBC QN AUTO: 29.5 PG (ref 27–31)
MCHC RBC AUTO-ENTMCNC: 33.7 G/DL (ref 32–36)
MCV RBC AUTO: 88 FL (ref 82–98)
MONOCYTES # BLD AUTO: 0.5 K/UL (ref 0.3–1)
MONOCYTES NFR BLD: 7.9 % (ref 4–15)
NEUTROPHILS # BLD AUTO: 4.6 K/UL (ref 1.8–7.7)
NEUTROPHILS NFR BLD: 66 % (ref 38–73)
NONHDLC SERPL-MCNC: 105 MG/DL
PLATELET # BLD AUTO: 186 K/UL (ref 150–350)
PMV BLD AUTO: 9 FL (ref 9.2–12.9)
POTASSIUM SERPL-SCNC: 4.8 MMOL/L (ref 3.5–5.1)
PROT SERPL-MCNC: 6.7 G/DL (ref 6–8.4)
RBC # BLD AUTO: 4.73 M/UL (ref 4.6–6.2)
SODIUM SERPL-SCNC: 139 MMOL/L (ref 136–145)
TRIGL SERPL-MCNC: 247 MG/DL (ref 30–150)
WBC # BLD AUTO: 7 K/UL (ref 3.9–12.7)

## 2019-06-17 PROCEDURE — 82043 UR ALBUMIN QUANTITATIVE: CPT

## 2019-06-17 PROCEDURE — 85025 COMPLETE CBC W/AUTO DIFF WBC: CPT

## 2019-06-17 PROCEDURE — 80061 LIPID PANEL: CPT

## 2019-06-17 PROCEDURE — 83036 HEMOGLOBIN GLYCOSYLATED A1C: CPT

## 2019-06-17 PROCEDURE — 80053 COMPREHEN METABOLIC PANEL: CPT

## 2019-06-17 NOTE — PROGRESS NOTES
ID patient by name and date of birth.  Specimen collected with 21g butterfly to right AC using aseptic technique. Patient tolerated well.

## 2019-06-18 LAB
ALBUMIN/CREAT UR: 22.4 UG/MG (ref 0–30)
CREAT UR-MCNC: 49 MG/DL (ref 23–375)
MICROALBUMIN UR DL<=1MG/L-MCNC: 11 UG/ML

## 2019-06-21 ENCOUNTER — HOSPITAL ENCOUNTER (OUTPATIENT)
Dept: RADIOLOGY | Facility: CLINIC | Age: 69
Discharge: HOME | End: 2019-06-21
Attending: INTERNAL MEDICINE
Payer: MEDICARE

## 2019-06-21 DIAGNOSIS — Z77.090 ASBESTOS EXPOSURE: ICD-10-CM

## 2019-06-21 DIAGNOSIS — J44.9 CHRONIC OBSTRUCTIVE PULMONARY DISEASE, UNSPECIFIED COPD TYPE: ICD-10-CM

## 2019-06-21 PROCEDURE — 71046 X-RAY EXAM CHEST 2 VIEWS: CPT | Mod: 26,,, | Performed by: RADIOLOGY

## 2019-06-21 PROCEDURE — 71046 XR CHEST PA AND LATERAL: ICD-10-PCS | Mod: 26,,, | Performed by: RADIOLOGY

## 2019-06-21 PROCEDURE — 71046 X-RAY EXAM CHEST 2 VIEWS: CPT | Mod: TC,FY,PO

## 2019-06-24 ENCOUNTER — TELEPHONE (OUTPATIENT)
Dept: PULMONOLOGY | Facility: CLINIC | Age: 69
End: 2019-06-24

## 2019-06-24 ENCOUNTER — DOCUMENTATION ONLY (OUTPATIENT)
Dept: FAMILY MEDICINE | Facility: CLINIC | Age: 69
End: 2019-06-24

## 2019-06-24 ENCOUNTER — OFFICE VISIT (OUTPATIENT)
Dept: FAMILY MEDICINE | Facility: CLINIC | Age: 69
End: 2019-06-24
Payer: MEDICARE

## 2019-06-24 ENCOUNTER — HOSPITAL ENCOUNTER (OUTPATIENT)
Dept: RADIOLOGY | Facility: CLINIC | Age: 69
Discharge: HOME | End: 2019-06-24
Attending: INTERNAL MEDICINE
Payer: MEDICARE

## 2019-06-24 VITALS
RESPIRATION RATE: 16 BRPM | HEIGHT: 71 IN | SYSTOLIC BLOOD PRESSURE: 120 MMHG | HEART RATE: 65 BPM | OXYGEN SATURATION: 95 % | WEIGHT: 212.5 LBS | DIASTOLIC BLOOD PRESSURE: 74 MMHG | BODY MASS INDEX: 29.75 KG/M2 | TEMPERATURE: 99 F

## 2019-06-24 DIAGNOSIS — R60.0 LEG EDEMA, RIGHT: ICD-10-CM

## 2019-06-24 DIAGNOSIS — Z77.090 HISTORY OF EXPOSURE TO ASBESTOS: ICD-10-CM

## 2019-06-24 DIAGNOSIS — N52.9 ERECTILE DYSFUNCTION, UNSPECIFIED ERECTILE DYSFUNCTION TYPE: ICD-10-CM

## 2019-06-24 DIAGNOSIS — E11.21 WELL CONTROLLED TYPE 2 DIABETES MELLITUS WITH NEPHROPATHY: Primary | ICD-10-CM

## 2019-06-24 DIAGNOSIS — J44.9 CHRONIC OBSTRUCTIVE PULMONARY DISEASE, UNSPECIFIED COPD TYPE: ICD-10-CM

## 2019-06-24 DIAGNOSIS — E11.69 HYPERLIPIDEMIA ASSOCIATED WITH TYPE 2 DIABETES MELLITUS: ICD-10-CM

## 2019-06-24 DIAGNOSIS — E78.5 HYPERLIPIDEMIA ASSOCIATED WITH TYPE 2 DIABETES MELLITUS: ICD-10-CM

## 2019-06-24 PROCEDURE — 3074F SYST BP LT 130 MM HG: CPT | Mod: CPTII,S$GLB,, | Performed by: INTERNAL MEDICINE

## 2019-06-24 PROCEDURE — 3078F PR MOST RECENT DIASTOLIC BLOOD PRESSURE < 80 MM HG: ICD-10-PCS | Mod: CPTII,S$GLB,, | Performed by: INTERNAL MEDICINE

## 2019-06-24 PROCEDURE — 99214 PR OFFICE/OUTPT VISIT, EST, LEVL IV, 30-39 MIN: ICD-10-PCS | Mod: S$GLB,,, | Performed by: INTERNAL MEDICINE

## 2019-06-24 PROCEDURE — 93971 EXTREMITY STUDY: CPT | Mod: 26,RT,S$GLB, | Performed by: RADIOLOGY

## 2019-06-24 PROCEDURE — 3078F DIAST BP <80 MM HG: CPT | Mod: CPTII,S$GLB,, | Performed by: INTERNAL MEDICINE

## 2019-06-24 PROCEDURE — 99499 RISK ADDL DX/OHS AUDIT: ICD-10-PCS | Mod: S$GLB,,, | Performed by: INTERNAL MEDICINE

## 2019-06-24 PROCEDURE — 93971 US LOWER EXTREMITY VEINS RIGHT: ICD-10-PCS | Mod: 26,RT,S$GLB, | Performed by: RADIOLOGY

## 2019-06-24 PROCEDURE — 99499 UNLISTED E&M SERVICE: CPT | Mod: S$GLB,,, | Performed by: INTERNAL MEDICINE

## 2019-06-24 PROCEDURE — 3044F PR MOST RECENT HEMOGLOBIN A1C LEVEL <7.0%: ICD-10-PCS | Mod: CPTII,S$GLB,, | Performed by: INTERNAL MEDICINE

## 2019-06-24 PROCEDURE — 93971 EXTREMITY STUDY: CPT | Mod: TC,PO,RT

## 2019-06-24 PROCEDURE — 3044F HG A1C LEVEL LT 7.0%: CPT | Mod: CPTII,S$GLB,, | Performed by: INTERNAL MEDICINE

## 2019-06-24 PROCEDURE — 99214 OFFICE O/P EST MOD 30 MIN: CPT | Mod: S$GLB,,, | Performed by: INTERNAL MEDICINE

## 2019-06-24 PROCEDURE — 3074F PR MOST RECENT SYSTOLIC BLOOD PRESSURE < 130 MM HG: ICD-10-PCS | Mod: CPTII,S$GLB,, | Performed by: INTERNAL MEDICINE

## 2019-06-24 PROCEDURE — 1101F PR PT FALLS ASSESS DOC 0-1 FALLS W/OUT INJ PAST YR: ICD-10-PCS | Mod: CPTII,S$GLB,, | Performed by: INTERNAL MEDICINE

## 2019-06-24 PROCEDURE — 1101F PT FALLS ASSESS-DOCD LE1/YR: CPT | Mod: CPTII,S$GLB,, | Performed by: INTERNAL MEDICINE

## 2019-06-24 RX ORDER — SILDENAFIL CITRATE 20 MG/1
TABLET ORAL
Qty: 30 TABLET | Refills: 11 | Status: SHIPPED | OUTPATIENT
Start: 2019-06-24 | End: 2020-03-24 | Stop reason: SDUPTHER

## 2019-06-24 RX ORDER — ALBUTEROL SULFATE 90 UG/1
2 AEROSOL, METERED RESPIRATORY (INHALATION) EVERY 6 HOURS PRN
Qty: 1 EACH | Refills: 11 | Status: SHIPPED | OUTPATIENT
Start: 2019-06-24 | End: 2020-06-18

## 2019-06-24 NOTE — PATIENT INSTRUCTIONS
Check sugars 3x/wk, once, once before and 2 hrs after a meal, write down what you ate, write down all of them.    Avoid white carbohydrates.   Eat  a palm sized protein with each meal.       Walk for 10 minutes after you eat.  This will keep your sugars from going high at that time.    Stop smoking.

## 2019-06-24 NOTE — PROGRESS NOTES
Health Maintenance Due   Topic Date Due    Pneumococcal Vaccine (65+ Low/Medium Risk) (1 of 2 - PCV13) 04/09/2016

## 2019-06-24 NOTE — TELEPHONE ENCOUNTER
Pt notifed   ----- Message from Dwight Samuels MD sent at 6/21/2019  6:16 PM CDT -----  Notify cxr good

## 2019-06-24 NOTE — PROGRESS NOTES
Subjective:       Patient ID: Umang Goldberg is a 69 y.o. male.    Chief Complaint: Diabetes (labs,)    HPI         CHIEF COMPLAINT: Hyperlipidemia. cholesterol screening: no.   HPI:     ONSET:    MODIFIERS/TREATMENTS: . Taking medications: yes. . Non-compliance with following diet: no. .     SYMPTOMS/RELATED:Possible medication side effects include:   Myalgia: no.  .     REVIEW OF SYMPTOMS: past weights:   Wt Readings from Last 1 Encounters:   06/24/19 0806 96.4 kg (212 lb 8.4 oz)                                                     Last lipids: total   Lab Results   Component Value Date    CHOL 141 06/17/2019    CHOL 124 03/15/2019    CHOL 109 (L) 12/17/2018                                                                     HDL   Lab Results   Component Value Date    HDL 36 (L) 06/17/2019    HDL 39 (L) 03/15/2019    HDL 38 (L) 12/17/2018                                                                     LDL   Lab Results   Component Value Date    LDLCALC 55.6 (L) 06/17/2019    LDLCALC 54.8 (L) 03/15/2019    LDLCALC 36.8 (L) 12/17/2018                                                                     TRIG   Lab Results   Component Value Date    TRIG 247 (H) 06/17/2019    TRIG 151 (H) 03/15/2019    TRIG 171 (H) 12/17/2018                                                                  The patient was on Chantix and doing well until they raise the price to foreign 50 dollars and he stopped taking it.  He is now smoking again.  He is on Symbicort but does not have a rescue inhaler.  He did not take the Symbicort today so he has little short of breath.        CHIEF COMPLAINT: Diabetes.  HPI:     ONSET/TIMING:     QUALITY/COURSE:   unchanged..      INTENSITY/SEVERITY: . Measures blood sugar :  0    CONTEXT/WHEN: last HgbA1c    Lab Results   Component Value Date    HGBA1C 5.8 (H) 06/17/2019      weights:    Wt Readings from Last 1 Encounters:   06/24/19 0806 96.4 kg (212 lb 8.4 oz)         SYMPTOMS/RELATED: . .  "Possible medication side effects include:     The following symptoms/statements  are present IF IN BOLD, negative otherwise.         MODIFIERS/TREATMENTS: Not_taking_medications:  .  Non-compliance_with_Diabetic_diet  .  No_eye_exam_within_last_year.  Not_practicing_good_foot_care.    REVIEW OF SYMPTOMS: . Weight_gain. Weight_loss. Neuropathy. Recurrent_infections. Skin_ulcers    Last week the patient had swelling of the left leg even though he is on Eliquis.  It has gone down now.    Hx of asbestosis  Review of Systems      Objective:      Vitals:    06/24/19 0806   BP: 120/74   Pulse: 65   Resp: 16   Temp: 98.5 °F (36.9 °C)   TempSrc: Oral   SpO2: 95%   Weight: 96.4 kg (212 lb 8.4 oz)   Height: 5' 11" (1.803 m)   PainSc: 0-No pain     Physical Exam   Constitutional: He appears well-developed and well-nourished.   Cardiovascular: Normal rate, regular rhythm and normal heart sounds.   Pulmonary/Chest: Effort normal and breath sounds normal.   Abdominal: Soft. There is no tenderness.   Neurological: He is alert.   Psychiatric: He has a normal mood and affect. His behavior is normal. Thought content normal.   Nursing note and vitals reviewed.      chest x-ray is normal  Assessment:       1. 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 Hyperlipidemia associated with type 2 diabetes mellitus    3. Chronic obstructive pulmonary disease, unspecified COPD type    4. History of exposure to asbestos    5. Leg edema, right    6. 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  -     CBC auto differential; Future; Expected date: 06/24/2019  -     Comprehensive metabolic panel; Future; Expected date: 06/24/2019  -     Hemoglobin A1c; Future; Expected date: 06/24/2019  -     Lipid panel; Future; Expected date: 06/24/2019  -     Microalbumin/creatinine urine ratio; Future    Hyperlipidemia associated with type 2 diabetes " mellitus  -     Lipid panel; Future; Expected date: 06/24/2019    Chronic obstructive pulmonary disease, unspecified COPD type  -     albuterol (PROVENTIL/VENTOLIN HFA) 90 mcg/actuation inhaler; Inhale 2 puffs into the lungs every 6 (six) hours as needed for Wheezing.  Dispense: 1 each; Refill: 11    History of exposure to asbestos    Leg edema, right  -     Cancel: US Lower Extremity Veins Left; Future; Expected date: 06/24/2019  -     US Lower Extremity Veins Right; Future; Expected date: 06/24/2019    Erectile dysfunction, unspecified erectile dysfunction type  -     sildenafil (REVATIO) 20 mg Tab; TAKE THREE TABLETS BY MOUTH ONCE DAILY AS NEEDED  Dispense: 30 tablet; Refill: 11      Follow up in about 3 months (around 9/24/2019).

## 2019-06-24 NOTE — TELEPHONE ENCOUNTER
Advised pt that he qualify's for oxygen.  Pt declines oxygen at this time. Will call back if he changes his mind.

## 2019-06-28 DIAGNOSIS — L03.119 TYPE 2 DIABETES, CONTROLLED, WITH CELLULITIS OF FOOT: ICD-10-CM

## 2019-06-28 DIAGNOSIS — E11.628 TYPE 2 DIABETES, CONTROLLED, WITH CELLULITIS OF FOOT: ICD-10-CM

## 2019-07-01 RX ORDER — METFORMIN HYDROCHLORIDE 1000 MG/1
TABLET ORAL
Qty: 90 TABLET | Refills: 0 | Status: SHIPPED | OUTPATIENT
Start: 2019-07-01 | End: 2019-08-20 | Stop reason: SDUPTHER

## 2019-07-09 DIAGNOSIS — I48.0 PAF (PAROXYSMAL ATRIAL FIBRILLATION): Primary | ICD-10-CM

## 2019-07-10 ENCOUNTER — OFFICE VISIT (OUTPATIENT)
Dept: CARDIOLOGY | Facility: CLINIC | Age: 69
End: 2019-07-10
Payer: MEDICARE

## 2019-07-10 VITALS
DIASTOLIC BLOOD PRESSURE: 69 MMHG | BODY MASS INDEX: 29.87 KG/M2 | OXYGEN SATURATION: 91 % | HEIGHT: 71 IN | SYSTOLIC BLOOD PRESSURE: 134 MMHG | WEIGHT: 213.38 LBS | HEART RATE: 63 BPM

## 2019-07-10 DIAGNOSIS — F10.10 ENGAGES IN BINGE CONSUMPTION OF ALCOHOL: ICD-10-CM

## 2019-07-10 DIAGNOSIS — K76.0 FATTY LIVER: ICD-10-CM

## 2019-07-10 DIAGNOSIS — Z79.899 LONG TERM CURRENT USE OF ANTIARRHYTHMIC DRUG: ICD-10-CM

## 2019-07-10 DIAGNOSIS — E65 ABDOMINAL OBESITY: ICD-10-CM

## 2019-07-10 DIAGNOSIS — E78.1 HYPERTRIGLYCERIDEMIA: ICD-10-CM

## 2019-07-10 DIAGNOSIS — Z79.01 ANTICOAGULANT LONG-TERM USE: ICD-10-CM

## 2019-07-10 DIAGNOSIS — Z72.0 TOBACCO ABUSE: ICD-10-CM

## 2019-07-10 DIAGNOSIS — E78.5 DYSLIPIDEMIA (HIGH LDL; LOW HDL): ICD-10-CM

## 2019-07-10 DIAGNOSIS — Z91.89 AT RISK FOR CARDIOVASCULAR EVENT: ICD-10-CM

## 2019-07-10 DIAGNOSIS — I48.0 PAF (PAROXYSMAL ATRIAL FIBRILLATION): Primary | ICD-10-CM

## 2019-07-10 PROCEDURE — 3075F PR MOST RECENT SYSTOLIC BLOOD PRESS GE 130-139MM HG: ICD-10-PCS | Mod: CPTII,S$GLB,, | Performed by: INTERNAL MEDICINE

## 2019-07-10 PROCEDURE — 1100F PTFALLS ASSESS-DOCD GE2>/YR: CPT | Mod: CPTII,S$GLB,, | Performed by: INTERNAL MEDICINE

## 2019-07-10 PROCEDURE — 99999 PR PBB SHADOW E&M-EST. PATIENT-LVL III: CPT | Mod: PBBFAC,,, | Performed by: INTERNAL MEDICINE

## 2019-07-10 PROCEDURE — 99215 OFFICE O/P EST HI 40 MIN: CPT | Mod: S$GLB,,, | Performed by: INTERNAL MEDICINE

## 2019-07-10 PROCEDURE — 3078F PR MOST RECENT DIASTOLIC BLOOD PRESSURE < 80 MM HG: ICD-10-PCS | Mod: CPTII,S$GLB,, | Performed by: INTERNAL MEDICINE

## 2019-07-10 PROCEDURE — 93000 EKG 12-LEAD: ICD-10-PCS | Mod: S$GLB,,, | Performed by: INTERNAL MEDICINE

## 2019-07-10 PROCEDURE — 99499 RISK ADDL DX/OHS AUDIT: ICD-10-PCS | Mod: S$GLB,,, | Performed by: INTERNAL MEDICINE

## 2019-07-10 PROCEDURE — 3288F FALL RISK ASSESSMENT DOCD: CPT | Mod: CPTII,S$GLB,, | Performed by: INTERNAL MEDICINE

## 2019-07-10 PROCEDURE — 3288F PR FALLS RISK ASSESSMENT DOCUMENTED: ICD-10-PCS | Mod: CPTII,S$GLB,, | Performed by: INTERNAL MEDICINE

## 2019-07-10 PROCEDURE — 99499 UNLISTED E&M SERVICE: CPT | Mod: S$GLB,,, | Performed by: INTERNAL MEDICINE

## 2019-07-10 PROCEDURE — 99215 PR OFFICE/OUTPT VISIT, EST, LEVL V, 40-54 MIN: ICD-10-PCS | Mod: S$GLB,,, | Performed by: INTERNAL MEDICINE

## 2019-07-10 PROCEDURE — 3078F DIAST BP <80 MM HG: CPT | Mod: CPTII,S$GLB,, | Performed by: INTERNAL MEDICINE

## 2019-07-10 PROCEDURE — 3075F SYST BP GE 130 - 139MM HG: CPT | Mod: CPTII,S$GLB,, | Performed by: INTERNAL MEDICINE

## 2019-07-10 PROCEDURE — 1100F PR PT FALLS ASSESS DOC 2+ FALLS/FALL W/INJURY/YR: ICD-10-PCS | Mod: CPTII,S$GLB,, | Performed by: INTERNAL MEDICINE

## 2019-07-10 PROCEDURE — 99999 PR PBB SHADOW E&M-EST. PATIENT-LVL III: ICD-10-PCS | Mod: PBBFAC,,, | Performed by: INTERNAL MEDICINE

## 2019-07-10 PROCEDURE — 93000 ELECTROCARDIOGRAM COMPLETE: CPT | Mod: S$GLB,,, | Performed by: INTERNAL MEDICINE

## 2019-07-10 NOTE — PROGRESS NOTES
"Subjective:    Patient ID:  Umang Goldberg is a 69 y.o. male who presents for evaluation of Annual Exam  For PAF on NOAC, HTN, T2DM not on insulin  PCP: Dr. Rey  Prior cardiologist: Dr. Velazquez, last seen 8/2018  Lives with wife, Aliza, non-smoker  Retired      Patient is a new patient to me.    Health literacy: medium to low  Activities: do own lawn and house work, play golf twice a month, no regular exercise, no problem and do not feel limited despite CLBP  Nicotine: half ppd, started age 18, 50+ py  Alcohol: 4-6 beers per week but can drink up to 6 a day when golfing  Illicit drugs: none  Cardiac symptoms: none  Home BP: 120/72  Medication compliance: yes  Diet: regular  Caffeine: 2-3 coffee cpd, 3-4 glasses of ice tea daily, no sleep problem.  Labs: 6/2019, LDL 55.6, on Rx, normal CMP (), CBC (Hgb 13.9), 12/2018 normal TSH, A1C 5.8%  Last Echo: none  Last stress test: none  Cardiovascular angiogram: none  ECG: NSR, 60, 1st degree AVB  Fundoscopic exam: within the past year, negative for HTN retinopathy    WM here for annual review, have a number of significant CV risk factors. No heart worries and denies any symptoms. Medication reduced by Dr. Sams from Rhythmol tid to just bid for many years. No Holter for review.     Dr. Velazquez noted "68 y.o. male with a past medical history of paroxysmal atrial fibrillation, diabetes, hypertension    Patient is feeling well.  Denies any excessive shortness of breath or chest pains.  I got the records of his stress test and echocardiography from the outside hospital  Patient continues to be on Eliquis.  He is on anti rhythmic along with a beta-blocker.  Recommendation regarding smoking cessation provided.  Recommendation regarding healthy lifestyle, diet and exercise given to the patient"    Chart reviewed 6/2019,  legs - No evidence of deep venous thrombosis in the right lower extremity.   CXR - No acute cardiopulmonary abnormality appreciated " radiographically.   US abdomen 11/2017 - Approximately 1.6 cm hyperechoic right hepatic lobe mass which is most likely a small hemangioma.  3. Borderline splenomegaly.  4. Mildly increased hepatic echogenicity suggestive of steatosis.   visualized upper abdominal aorta is ectatic but nonaneurysmal. The visualized upper abdominal IVC is unremarkable.         Review of Systems   Constitution: Negative for diaphoresis, fever, malaise/fatigue, night sweats and weight gain.   HENT: Positive for stridor. Negative for nosebleeds and tinnitus.    Eyes: Negative for visual disturbance.   Cardiovascular: Negative for chest pain, claudication, cyanosis, dyspnea on exertion, irregular heartbeat, leg swelling, near-syncope, orthopnea, palpitations and paroxysmal nocturnal dyspnea.   Respiratory: Positive for cough, snoring and wheezing. Negative for shortness of breath and sleep disturbances due to breathing.         Sherrill score 2   Endocrine: Positive for polydipsia. Negative for polyuria.   Hematologic/Lymphatic: Does not bruise/bleed easily.   Skin: Negative for color change, flushing, nail changes, poor wound healing and suspicious lesions.   Musculoskeletal: Positive for back pain and muscle cramps. Negative for arthritis, falls, gout, joint pain, joint swelling, muscle weakness and myalgias.   Gastrointestinal: Positive for diarrhea and hemorrhoids. Negative for heartburn, hematemesis, hematochezia, melena and nausea.   Neurological: Negative for disturbances in coordination, excessive daytime sleepiness, dizziness, focal weakness, headaches, light-headedness, loss of balance, numbness, vertigo and weakness.   Psychiatric/Behavioral: Negative for depression and substance abuse. The patient does not have insomnia and is not nervous/anxious.         Objective:    Physical Exam   Constitutional: He is oriented to person, place, and time. He appears well-developed and well-nourished.   HENT:   Head: Normocephalic.   Eyes:  "Pupils are equal, round, and reactive to light. Conjunctivae and EOM are normal.   Neck: Normal range of motion. Neck supple. No JVD present. No thyromegaly present.   Circumference 15.5"   Cardiovascular: Normal rate, regular rhythm and intact distal pulses. Exam reveals distant heart sounds. Exam reveals no gallop and no friction rub.   No murmur heard.  Pulses:       Carotid pulses are 2+ on the right side, and 2+ on the left side.       Radial pulses are 2+ on the right side, and 2+ on the left side.        Femoral pulses are 2+ on the right side, and 2+ on the left side.       Popliteal pulses are 2+ on the right side, and 2+ on the left side.        Dorsalis pedis pulses are 1+ on the right side, and 1+ on the left side.        Posterior tibial pulses are 1+ on the right side, and 1+ on the left side.   Pulmonary/Chest: Effort normal and breath sounds normal. He has no rales. He exhibits no tenderness.   Diminished breath sounds and prolong expiration with wheezing   Abdominal: Soft. Bowel sounds are normal. There is no tenderness.   Waist 48", hip 45"   Musculoskeletal: Normal range of motion. He exhibits no edema.   Lymphadenopathy:     He has no cervical adenopathy.   Neurological: He is alert and oriented to person, place, and time.   Skin: Skin is warm and dry. No rash noted.         Assessment:       1. PAF (paroxysmal atrial fibrillation)    2. BMI 29.0-29.9,adult    3. Abdominal obesity    4. Engages in binge consumption of alcohol    5. Dyslipidemia (high LDL; low HDL), baseline LDL not available    6. Hypertriglyceridemia    7. Tobacco abuse, half ppd, started age 18, 50+ py    8. Anticoagulant long-term use    9. Fatty liver    10. Long term current use of antiarrhythmic drug    11. At risk for cardiovascular event         Plan:       Umang was seen today for annual exam.    Diagnoses and all orders for this visit:    PAF (paroxysmal atrial fibrillation)  -     EKG 12-lead  -     Echocardiogram " stress test with CFD (Cupid Only); Future  -     Holter monitor - 48 hour; Future    BMI 29.0-29.9,adult    Abdominal obesity    Engages in binge consumption of alcohol  -     Echocardiogram stress test with CFD (Cupid Only); Future    Dyslipidemia (high LDL; low HDL), baseline LDL not available    Hypertriglyceridemia    Tobacco abuse, half ppd, started age 18, 50+ py    Anticoagulant long-term use    Fatty liver    Long term current use of antiarrhythmic drug  -     Echocardiogram stress test with CFD (Cupid Only); Future  -     Holter monitor - 48 hour; Future    At risk for cardiovascular event  -     Echocardiogram stress test with CFD (Cupid Only); Future    - All medical issues reviewed, continue current Rx.  - CV status stable, continue current Rx, all medications reviewed, patient acknowledge good understanding.  - Recommend healthy living: stop nicotine, moderate alcohol, healthy diet and regular exercise aiming for fitness, and weight control   - Discussed healthy daily limit of 1 oz of pure alcohol in any 24 hours (roughly 2 12-oz beers, 8 oz of wine (8%-12% alcohol), or 2.5 oz of liquor (80 proof)), can not save up.  - Instruction for Mediterranean diet and heart healthy exercise given.  - Check home blood pressure, 2 days weekly, do 2 readings within 5 minutes in AM and PM, keep log for review.  - Weigh twice weekly, try to lose 1-2 lbs per week. Target weight loss of 5%-10%.  - Have to do some abdominal exercise to rid belly fat  - Highly recommend 30 minutes of exercise / activities daily, can have Sunday off, with 2-3 sessions of muscle strengthening weekly. A  would be very helpful.  - Recommend at least annual cardiovascular evaluation in view of patient's significant risk factors.  - Phone review / encourage use of [facility], [facility]      Total face-to-face time with the patient was 45 minutes and greater than 50% was spent in counseling and coordination of care. The above  assessment and plan have been discussed at length. Referring physician's note reviewed. Labs and procedure over the last 6 months reviewed. Problem List updated. Asked to bring in all active medications / pills bottles with next visit.

## 2019-07-10 NOTE — PATIENT INSTRUCTIONS

## 2019-08-01 DIAGNOSIS — E11.69 HYPERLIPIDEMIA ASSOCIATED WITH TYPE 2 DIABETES MELLITUS: ICD-10-CM

## 2019-08-01 DIAGNOSIS — E78.5 HYPERLIPIDEMIA ASSOCIATED WITH TYPE 2 DIABETES MELLITUS: ICD-10-CM

## 2019-08-01 RX ORDER — ATORVASTATIN CALCIUM 20 MG/1
20 TABLET, FILM COATED ORAL DAILY
Qty: 90 TABLET | Refills: 3 | Status: SHIPPED | OUTPATIENT
Start: 2019-08-01 | End: 2020-03-24 | Stop reason: SDUPTHER

## 2019-08-01 RX ORDER — PROPAFENONE HYDROCHLORIDE 150 MG/1
150 TABLET, COATED ORAL 2 TIMES DAILY
Qty: 180 TABLET | Refills: 3 | Status: SHIPPED | OUTPATIENT
Start: 2019-08-01 | End: 2022-08-15 | Stop reason: SDUPTHER

## 2019-08-05 DIAGNOSIS — E11.21 WELL CONTROLLED TYPE 2 DIABETES MELLITUS WITH NEPHROPATHY: Primary | ICD-10-CM

## 2019-08-05 RX ORDER — LANCETS
EACH MISCELLANEOUS
Qty: 100 EACH | Refills: 3 | Status: SHIPPED | OUTPATIENT
Start: 2019-08-05 | End: 2024-03-07

## 2019-08-05 NOTE — TELEPHONE ENCOUNTER
----- Message from Natasha Santa sent at 8/5/2019  9:41 AM CDT -----  Type:  Pharmacy Calling to Clarify an RX    Name of Caller:  pippa  [facility]  Prescription Name:  Diabetic  stripes  What do they need to clarify?:  clinic  Notes//  signed  Orders  Medical  ne sassy  Form  signed  Best Call Back Number: [phone]  Additional Information:    Call  For  All  info

## 2019-08-09 NOTE — TELEPHONE ENCOUNTER
Faxed rx, clinic notes and medically necessity form to [email or web address]. Received fax confirmation back.

## 2019-08-20 ENCOUNTER — TELEPHONE (OUTPATIENT)
Dept: FAMILY MEDICINE | Facility: CLINIC | Age: 69
End: 2019-08-20

## 2019-08-20 DIAGNOSIS — L03.119 TYPE 2 DIABETES, CONTROLLED, WITH CELLULITIS OF FOOT: ICD-10-CM

## 2019-08-20 DIAGNOSIS — E11.628 TYPE 2 DIABETES, CONTROLLED, WITH CELLULITIS OF FOOT: ICD-10-CM

## 2019-08-20 DIAGNOSIS — E11.21 WELL CONTROLLED TYPE 2 DIABETES MELLITUS WITH NEPHROPATHY: ICD-10-CM

## 2019-08-20 RX ORDER — INSULIN PUMP SYRINGE, 3 ML
EACH MISCELLANEOUS
Qty: 1 EACH | Refills: 0 | Status: SHIPPED | OUTPATIENT
Start: 2019-08-20 | End: 2022-06-28

## 2019-08-20 RX ORDER — METFORMIN HYDROCHLORIDE 1000 MG/1
TABLET ORAL
Qty: 90 TABLET | Refills: 0 | Status: SHIPPED | OUTPATIENT
Start: 2019-08-20 | End: 2019-09-30 | Stop reason: SDUPTHER

## 2019-08-20 NOTE — TELEPHONE ENCOUNTER
Spoke with pharmacy regarding diabetic strips and they advised me that they have been authorized and faxed over to [facility]. Called patient and advised him to contact DME. Patient verbalized understanding.

## 2019-08-20 NOTE — TELEPHONE ENCOUNTER
"----- Message from Stu Arianna sent at 8/20/2019  8:40 AM CDT -----  Contact: Patient  Type:  RX Refill Request    Who Called:  Patient  Refill or New Rx:  Refill  RX Name and Strength:  "all of them", and specifically test strips  How is the patient currently taking it? (ex. 1XDay):  As ordered  Is this a 30 day or 90 day RX:  90  Preferred Pharmacy with phone number:    [facility]/pharmacy #[id number] - [place] - [address]  [address] [address]  Phone: [phone] Fax: [phone]  Local or Mail Order:  Local  Ordering Provider:  Dr. Rey  [facility] Call Back Number:  [phone]  Additional Information:  Patient states test strips need to be sent through [employer]. Thanks!  "

## 2019-08-20 NOTE — TELEPHONE ENCOUNTER
Needs orders for true matrix and supplies.  Needs to be faxed to [facility] when completed.

## 2019-09-03 DIAGNOSIS — E11.21 WELL CONTROLLED TYPE 2 DIABETES MELLITUS WITH NEPHROPATHY: ICD-10-CM

## 2019-09-03 RX ORDER — LISINOPRIL 5 MG/1
TABLET ORAL
Qty: 90 TABLET | Refills: 3 | Status: SHIPPED | OUTPATIENT
Start: 2019-09-03 | End: 2020-03-24 | Stop reason: SDUPTHER

## 2019-09-17 ENCOUNTER — CLINICAL SUPPORT (OUTPATIENT)
Dept: FAMILY MEDICINE | Facility: CLINIC | Age: 69
End: 2019-09-17
Payer: MEDICARE

## 2019-09-17 ENCOUNTER — APPOINTMENT (OUTPATIENT)
Dept: LAB | Facility: HOSPITAL | Age: 69
End: 2019-09-17
Attending: INTERNAL MEDICINE
Payer: MEDICARE

## 2019-09-17 DIAGNOSIS — E11.21 WELL CONTROLLED TYPE 2 DIABETES MELLITUS WITH NEPHROPATHY: ICD-10-CM

## 2019-09-17 DIAGNOSIS — D64.9 ANEMIA, UNSPECIFIED TYPE: ICD-10-CM

## 2019-09-17 DIAGNOSIS — E11.69 HYPERLIPIDEMIA ASSOCIATED WITH TYPE 2 DIABETES MELLITUS: ICD-10-CM

## 2019-09-17 DIAGNOSIS — E78.5 HYPERLIPIDEMIA ASSOCIATED WITH TYPE 2 DIABETES MELLITUS: ICD-10-CM

## 2019-09-17 LAB
ALBUMIN SERPL BCP-MCNC: 4.2 G/DL (ref 3.5–5.2)
ALP SERPL-CCNC: 55 U/L (ref 55–135)
ALT SERPL W/O P-5'-P-CCNC: 17 U/L (ref 10–44)
ANION GAP SERPL CALC-SCNC: 12 MMOL/L (ref 8–16)
AST SERPL-CCNC: 16 U/L (ref 10–40)
BASOPHILS # BLD AUTO: 0.04 K/UL (ref 0–0.2)
BASOPHILS NFR BLD: 0.6 % (ref 0–1.9)
BILIRUB SERPL-MCNC: 0.3 MG/DL (ref 0.1–1)
BUN SERPL-MCNC: 13 MG/DL (ref 8–23)
CALCIUM SERPL-MCNC: 9.7 MG/DL (ref 8.7–10.5)
CHLORIDE SERPL-SCNC: 100 MMOL/L (ref 95–110)
CHOLEST SERPL-MCNC: 124 MG/DL (ref 120–199)
CHOLEST/HDLC SERPL: 3.5 {RATIO} (ref 2–5)
CO2 SERPL-SCNC: 25 MMOL/L (ref 23–29)
CREAT SERPL-MCNC: 0.9 MG/DL (ref 0.5–1.4)
DIFFERENTIAL METHOD: NORMAL
EOSINOPHIL # BLD AUTO: 0.3 K/UL (ref 0–0.5)
EOSINOPHIL NFR BLD: 4 % (ref 0–8)
ERYTHROCYTE [DISTWIDTH] IN BLOOD BY AUTOMATED COUNT: 12.9 % (ref 11.5–14.5)
EST. GFR  (AFRICAN AMERICAN): >60 ML/MIN/1.73 M^2
EST. GFR  (NON AFRICAN AMERICAN): >60 ML/MIN/1.73 M^2
FERRITIN SERPL-MCNC: 43 NG/ML (ref 20–300)
GLUCOSE SERPL-MCNC: 105 MG/DL (ref 70–110)
HCT VFR BLD AUTO: 45.9 % (ref 40–54)
HDLC SERPL-MCNC: 35 MG/DL (ref 40–75)
HDLC SERPL: 28.2 % (ref 20–50)
HGB BLD-MCNC: 14.8 G/DL (ref 14–18)
IMM GRANULOCYTES # BLD AUTO: 0.04 K/UL (ref 0–0.04)
LDLC SERPL CALC-MCNC: 49.4 MG/DL (ref 63–159)
LYMPHOCYTES # BLD AUTO: 1.7 K/UL (ref 1–4.8)
LYMPHOCYTES NFR BLD: 24 % (ref 18–48)
MCH RBC QN AUTO: 30 PG (ref 27–31)
MCHC RBC AUTO-ENTMCNC: 32.2 G/DL (ref 32–36)
MCV RBC AUTO: 93 FL (ref 82–98)
MONOCYTES # BLD AUTO: 0.7 K/UL (ref 0.3–1)
MONOCYTES NFR BLD: 9.8 % (ref 4–15)
NEUTROPHILS # BLD AUTO: 4.3 K/UL (ref 1.8–7.7)
NEUTROPHILS NFR BLD: 61 % (ref 38–73)
NONHDLC SERPL-MCNC: 89 MG/DL
NRBC BLD-RTO: 0 /100 WBC
PLATELET # BLD AUTO: 188 K/UL (ref 150–350)
PMV BLD AUTO: 11.1 FL (ref 9.2–12.9)
POTASSIUM SERPL-SCNC: 4.8 MMOL/L (ref 3.5–5.1)
PROT SERPL-MCNC: 6.9 G/DL (ref 6–8.4)
RBC # BLD AUTO: 4.94 M/UL (ref 4.6–6.2)
SODIUM SERPL-SCNC: 137 MMOL/L (ref 136–145)
TRIGL SERPL-MCNC: 198 MG/DL (ref 30–150)
WBC # BLD AUTO: 6.97 K/UL (ref 3.9–12.7)

## 2019-09-17 PROCEDURE — 85025 COMPLETE CBC W/AUTO DIFF WBC: CPT

## 2019-09-17 PROCEDURE — 82728 ASSAY OF FERRITIN: CPT

## 2019-09-17 PROCEDURE — 83036 HEMOGLOBIN GLYCOSYLATED A1C: CPT

## 2019-09-17 PROCEDURE — 80061 LIPID PANEL: CPT

## 2019-09-17 PROCEDURE — 80053 COMPREHEN METABOLIC PANEL: CPT

## 2019-09-17 PROCEDURE — 83540 ASSAY OF IRON: CPT

## 2019-09-17 PROCEDURE — 82043 UR ALBUMIN QUANTITATIVE: CPT

## 2019-09-18 LAB
ALBUMIN/CREAT UR: 98.2 UG/MG (ref 0–30)
CREAT UR-MCNC: 56 MG/DL (ref 23–375)
ESTIMATED AVG GLUCOSE: 108 MG/DL (ref 68–131)
HBA1C MFR BLD HPLC: 5.4 % (ref 4–5.6)
IRON SERPL-MCNC: 76 UG/DL (ref 45–160)
MICROALBUMIN UR DL<=1MG/L-MCNC: 55 UG/ML
SATURATED IRON: 21 % (ref 20–50)
TOTAL IRON BINDING CAPACITY: 366 UG/DL (ref 250–450)
TRANSFERRIN SERPL-MCNC: 247 MG/DL (ref 200–375)

## 2019-09-20 NOTE — TELEPHONE ENCOUNTER
----- Message from Stacie Barnes sent at 9/20/2019  9:11 AM CDT -----  Contact: self  Type:  RX Refill Request    Who Called:  self  Refill or New Rx:  new  RX Name and Strength:  apixaban (ELIQUIS) 5 mg Tab  How is the patient currently taking it? (ex. 1XDay):  2Xday  Is this a 30 day or 90 day RX:  30  Preferred Pharmacy with phone number:    [facility]/pharmacy #[id number] - [place] - [address]  [address] [address]  Phone: [phone] Fax: [phone]  Local or Mail Order:  al  Ordering Provider:  Dr Candido Pena Call Back Number:  [phone] (home)   Additional Information:  Patient is in the donut hole with his insurance and was advise by the pharmacy to have a new prescription called in. Please call patient if any questions. Thanks!

## 2019-09-24 ENCOUNTER — OFFICE VISIT (OUTPATIENT)
Dept: FAMILY MEDICINE | Facility: CLINIC | Age: 69
End: 2019-09-24
Payer: MEDICARE

## 2019-09-24 VITALS
HEIGHT: 71 IN | HEART RATE: 62 BPM | BODY MASS INDEX: 29.5 KG/M2 | SYSTOLIC BLOOD PRESSURE: 124 MMHG | RESPIRATION RATE: 16 BRPM | DIASTOLIC BLOOD PRESSURE: 84 MMHG | WEIGHT: 210.75 LBS | OXYGEN SATURATION: 96 % | TEMPERATURE: 98 F

## 2019-09-24 DIAGNOSIS — E11.59 HYPERTENSION ASSOCIATED WITH DIABETES: ICD-10-CM

## 2019-09-24 DIAGNOSIS — E78.5 HYPERLIPIDEMIA ASSOCIATED WITH TYPE 2 DIABETES MELLITUS: ICD-10-CM

## 2019-09-24 DIAGNOSIS — I48.0 PAROXYSMAL ATRIAL FIBRILLATION: ICD-10-CM

## 2019-09-24 DIAGNOSIS — E11.69 HYPERLIPIDEMIA ASSOCIATED WITH TYPE 2 DIABETES MELLITUS: ICD-10-CM

## 2019-09-24 DIAGNOSIS — I15.2 HYPERTENSION ASSOCIATED WITH DIABETES: ICD-10-CM

## 2019-09-24 DIAGNOSIS — E11.21 WELL CONTROLLED TYPE 2 DIABETES MELLITUS WITH NEPHROPATHY: Primary | ICD-10-CM

## 2019-09-24 PROCEDURE — 90662 FLU VACCINE - HIGH DOSE (65+) PRESERVATIVE FREE IM: ICD-10-PCS | Mod: S$GLB,,, | Performed by: INTERNAL MEDICINE

## 2019-09-24 PROCEDURE — G0008 ADMIN INFLUENZA VIRUS VAC: HCPCS | Mod: S$GLB,,, | Performed by: INTERNAL MEDICINE

## 2019-09-24 PROCEDURE — 99499 UNLISTED E&M SERVICE: CPT | Mod: S$GLB,,, | Performed by: INTERNAL MEDICINE

## 2019-09-24 PROCEDURE — 99499 RISK ADDL DX/OHS AUDIT: ICD-10-PCS | Mod: S$GLB,,, | Performed by: INTERNAL MEDICINE

## 2019-09-24 PROCEDURE — 3044F PR MOST RECENT HEMOGLOBIN A1C LEVEL <7.0%: ICD-10-PCS | Mod: CPTII,S$GLB,, | Performed by: INTERNAL MEDICINE

## 2019-09-24 PROCEDURE — 99214 PR OFFICE/OUTPT VISIT, EST, LEVL IV, 30-39 MIN: ICD-10-PCS | Mod: 25,S$GLB,, | Performed by: INTERNAL MEDICINE

## 2019-09-24 PROCEDURE — 1101F PT FALLS ASSESS-DOCD LE1/YR: CPT | Mod: CPTII,S$GLB,, | Performed by: INTERNAL MEDICINE

## 2019-09-24 PROCEDURE — 99214 OFFICE O/P EST MOD 30 MIN: CPT | Mod: 25,S$GLB,, | Performed by: INTERNAL MEDICINE

## 2019-09-24 PROCEDURE — 3044F HG A1C LEVEL LT 7.0%: CPT | Mod: CPTII,S$GLB,, | Performed by: INTERNAL MEDICINE

## 2019-09-24 PROCEDURE — G0008 FLU VACCINE - HIGH DOSE (65+) PRESERVATIVE FREE IM: ICD-10-PCS | Mod: S$GLB,,, | Performed by: INTERNAL MEDICINE

## 2019-09-24 PROCEDURE — 3079F PR MOST RECENT DIASTOLIC BLOOD PRESSURE 80-89 MM HG: ICD-10-PCS | Mod: CPTII,S$GLB,, | Performed by: INTERNAL MEDICINE

## 2019-09-24 PROCEDURE — 3074F PR MOST RECENT SYSTOLIC BLOOD PRESSURE < 130 MM HG: ICD-10-PCS | Mod: CPTII,S$GLB,, | Performed by: INTERNAL MEDICINE

## 2019-09-24 PROCEDURE — 1101F PR PT FALLS ASSESS DOC 0-1 FALLS W/OUT INJ PAST YR: ICD-10-PCS | Mod: CPTII,S$GLB,, | Performed by: INTERNAL MEDICINE

## 2019-09-24 PROCEDURE — 90662 IIV NO PRSV INCREASED AG IM: CPT | Mod: S$GLB,,, | Performed by: INTERNAL MEDICINE

## 2019-09-24 PROCEDURE — 3079F DIAST BP 80-89 MM HG: CPT | Mod: CPTII,S$GLB,, | Performed by: INTERNAL MEDICINE

## 2019-09-24 PROCEDURE — 3074F SYST BP LT 130 MM HG: CPT | Mod: CPTII,S$GLB,, | Performed by: INTERNAL MEDICINE

## 2019-09-24 NOTE — PATIENT INSTRUCTIONS
In the next 2 weeks tele 100 people that were going to quit smoking in the following way I can't wait to quit smoking but Doctor Butt will not let me.  Is going to be so great when I do quit.  I am going to be setting it undermining on going to buy ______ for myself.  Going to breathe better and be able to keep up with my grandchildren.  I am going to do a lot more exercise and have a lot more fun.      Lose 5 pounds.  Suggest [place] diet    Avoid white carbohydrates.   Eat  a palm sized protein with each meal.       Walk for 10 minutes after you eat.  This will keep your sugars from going high at that time.      Thank you for choosing [facility].     Please fill out the patient experience survey.

## 2019-09-24 NOTE — PROGRESS NOTES
Subjective:      8:16 AM     Patient ID: Umang Goldberg is a 69 y.o. male.    Chief Complaint: Diabetes (labs,no refills)    HPI     Has paroxysmal atrial fibrillation and is on Eliquis.  There has been no bleeding.  There has been no chest pain or shortness of breath.  No unilateral weakness or numbness.  Patient has no palpitations    CHIEF COMPLAINT: Diabetes.  HPI:     ONSET/TIMING:     QUALITY/COURSE:   unchanged..      INTENSITY/SEVERITY: . Measures blood sugar :  3 times per week.        Lowest recent BS 90. Average .     CONTEXT/WHEN: last HgbA1c    Lab Results   Component Value Date    HGBA1C 5.4 09/17/2019      weights:    Wt Readings from Last 1 Encounters:   09/24/19 0806 95.6 kg (210 lb 12.2 oz)         SYMPTOMS/RELATED: . . Possible medication side effects include:     The following symptoms/statements  are present IF IN BOLD, negative otherwise.         MODIFIERS/TREATMENTS: Not_taking_medications:  .  Non-compliance_with_Diabetic_diet  .  No_eye_exam_within_last_year.  Not_practicing_good_foot_care.    REVIEW OF SYMPTOMS: . Weight_gain. Weight_loss. Neuropathy. Recurrent_infections. Skin_ulcers        CHIEF COMPLAINT: Hypertension  HPI:     ONSET:      QUALITY/COURSE:   Unchanged.     INTENSITY/SEVERITY:  Average blood pressure is unknown.      MODIFIERS/TREATMENTS:  Taking medications: yes. .High sodium intake: no. alcohol: no      The following symptoms are positive only if BOLDED, otherwise are negative.      SYMPTOMS/RELATED: Possible medication side effects include:   Depression..  . Cough. . Constipation.    REVIEW OF SYMPTOMS: . Weight_loss . Weight_gain . Leg_cramps .Potency_problems .    TARGET ORGAN DAMAGE:: angina/ prior myocardial infarction, chronic kidney disease, heart failure, left ventricular hypertrophy, peripheral artery disease, prior coronary revascularization, retinopathy, stroke. transient ischemic attack.        CHIEF COMPLAINT: Hyperlipidemia. cholesterol screening: no.  "  HPI:     ONSET:    MODIFIERS/TREATMENTS: . Taking medications: yes. . Non-compliance with following diet: no. .     SYMPTOMS/RELATED:Possible medication side effects include:   Myalgia: no.  .     REVIEW OF SYMPTOMS: past weights:   Wt Readings from Last 1 Encounters:   09/24/19 0806 95.6 kg (210 lb 12.2 oz)                                                     Last lipids: total   Lab Results   Component Value Date    CHOL 124 09/17/2019    CHOL 141 06/17/2019    CHOL 124 03/15/2019                                                                     HDL   Lab Results   Component Value Date    HDL 35 (L) 09/17/2019    HDL 36 (L) 06/17/2019    HDL 39 (L) 03/15/2019                                                                     LDL   Lab Results   Component Value Date    LDLCALC 49.4 (L) 09/17/2019    LDLCALC 55.6 (L) 06/17/2019    LDLCALC 54.8 (L) 03/15/2019                                                                     TRIG   Lab Results   Component Value Date    TRIG 198 (H) 09/17/2019    TRIG 247 (H) 06/17/2019    TRIG 151 (H) 03/15/2019                                                                     Patient is smoking half pack a day.  He was vaping but switch back due to the vaping scare.  He has failed smoking cessation program.    Review of Systems      Objective:      Vitals:    09/24/19 0806   BP: 124/84   Pulse: 62   Resp: 16   Temp: 98.1 °F (36.7 °C)   TempSrc: Oral   SpO2: 96%   Weight: 95.6 kg (210 lb 12.2 oz)   Height: 5' 11" (1.803 m)   PainSc: 0-No pain     Physical Exam   Constitutional: He appears well-developed and well-nourished.   Cardiovascular: Normal rate, regular rhythm and normal heart sounds.   Pulses:       Dorsalis pedis pulses are 2+ on the right side, and 2+ on the left side.   Pulmonary/Chest: Effort normal and breath sounds normal. No respiratory distress. He has no wheezes.   Abdominal: Soft. Bowel sounds are normal. There is no tenderness.   Musculoskeletal:        " Right foot: There is normal range of motion and no deformity.        Left foot: There is normal range of motion and no deformity.   Feet:   Right Foot:   Protective Sensation: 5 sites tested. 5 sites sensed.   Skin Integrity: Negative for ulcer, blister, skin breakdown, erythema, warmth, callus or dry skin.   Left Foot:   Protective Sensation: 5 sites tested. 5 sites sensed.   Skin Integrity: Negative for ulcer, blister, skin breakdown, erythema, warmth, callus or dry skin.       albumin creatinine ratio 98  Assessment:       1. 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 Hyperlipidemia associated with type 2 diabetes mellitus    3. Hypertension associated with diabetes    4. 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  -     CBC auto differential; Future; Expected date: 09/24/2019  -     Comprehensive metabolic panel; Future; Expected date: 09/24/2019  -     Hemoglobin A1c; Future; Expected date: 09/24/2019  -     Lipid panel; Future; Expected date: 09/24/2019  -     Microalbumin/creatinine urine ratio; Future    Hyperlipidemia associated with type 2 diabetes mellitus  -     Comprehensive metabolic panel; Future; Expected date: 09/24/2019    Hypertension associated with diabetes  -     Lipid panel; Future; Expected date: 09/24/2019    Paroxysmal atrial fibrillation    Other orders  -     Influenza - High Dose (65+) (PF) (IM)      Follow up in about 6 months (around 3/24/2020).

## 2019-09-30 DIAGNOSIS — E11.628 TYPE 2 DIABETES, CONTROLLED, WITH CELLULITIS OF FOOT: ICD-10-CM

## 2019-09-30 DIAGNOSIS — L03.119 TYPE 2 DIABETES, CONTROLLED, WITH CELLULITIS OF FOOT: ICD-10-CM

## 2019-10-01 RX ORDER — METFORMIN HYDROCHLORIDE 1000 MG/1
TABLET ORAL
Qty: 90 TABLET | Refills: 0 | Status: SHIPPED | OUTPATIENT
Start: 2019-10-01 | End: 2019-11-10 | Stop reason: SDUPTHER

## 2019-11-10 DIAGNOSIS — L03.119 TYPE 2 DIABETES, CONTROLLED, WITH CELLULITIS OF FOOT: ICD-10-CM

## 2019-11-10 DIAGNOSIS — E11.628 TYPE 2 DIABETES, CONTROLLED, WITH CELLULITIS OF FOOT: ICD-10-CM

## 2019-11-11 RX ORDER — METFORMIN HYDROCHLORIDE 1000 MG/1
TABLET ORAL
Qty: 90 TABLET | Refills: 0 | Status: SHIPPED | OUTPATIENT
Start: 2019-11-11 | End: 2019-12-09 | Stop reason: SDUPTHER

## 2019-12-09 DIAGNOSIS — E11.628 TYPE 2 DIABETES, CONTROLLED, WITH CELLULITIS OF FOOT: ICD-10-CM

## 2019-12-09 DIAGNOSIS — L03.119 TYPE 2 DIABETES, CONTROLLED, WITH CELLULITIS OF FOOT: ICD-10-CM

## 2019-12-09 RX ORDER — METFORMIN HYDROCHLORIDE 1000 MG/1
TABLET ORAL
Qty: 90 TABLET | Refills: 0 | Status: SHIPPED | OUTPATIENT
Start: 2019-12-09 | End: 2020-02-11

## 2020-02-11 DIAGNOSIS — E11.628 TYPE 2 DIABETES, CONTROLLED, WITH CELLULITIS OF FOOT: ICD-10-CM

## 2020-02-11 DIAGNOSIS — L03.119 TYPE 2 DIABETES, CONTROLLED, WITH CELLULITIS OF FOOT: ICD-10-CM

## 2020-02-11 RX ORDER — METFORMIN HYDROCHLORIDE 1000 MG/1
TABLET ORAL
Qty: 90 TABLET | Refills: 0 | Status: SHIPPED | OUTPATIENT
Start: 2020-02-11 | End: 2020-03-23

## 2020-02-28 DIAGNOSIS — E11.9 TYPE 2 DIABETES MELLITUS WITHOUT COMPLICATION, UNSPECIFIED WHETHER LONG TERM INSULIN USE: ICD-10-CM

## 2020-03-10 ENCOUNTER — PATIENT OUTREACH (OUTPATIENT)
Dept: ADMINISTRATIVE | Facility: HOSPITAL | Age: 70
End: 2020-03-10

## 2020-03-10 NOTE — PROGRESS NOTES
Chart review completed 03/10/2020.  Care Everywhere updates requested and reviewed.  Immunizations reconciled. Media reviewed.     Letter mailed.

## 2020-03-10 NOTE — LETTER
March 10, 2020    Umang PAULINE Ashlyn  [address]  [place] [address]     [facility]  [address]  [facility] [address]  Phone: [phone] Umang Goldberg  [address]  [place] [address]    Dear, Umang Goldberg    [facility] is committed to your overall health.  To help you get the most out of each of your visits, we will review your information to make sure you are up to date on all of your recommended tests and/or procedures.  Jc Rey MD  has found that your chart shows you may be due for the following:    Health Maintenance Due   Topic    Eye Exam     Fecal Occult Blood Test (FOBT)/FitKit        If you have had any of the above done at another facility, please bring the records with you or Fax them to [phone] so that your record at [facility] will be complete. If you have not had any of these tests or procedures done recently and would like to complete this testing ,  please call [phone] or send a message through your [facility] portal to your provider's office.     If you have an upcoming scheduled appointment for the above test and/or procedures, please disregard this letter.    If you are currently taking medication, please bring it with you to your appointment for review.    Thank you for letting us care for you,    Omar Ennis, Care Coordinator  [facility]  Phone: [phone]  Fax: [phone]

## 2020-03-17 ENCOUNTER — CLINICAL SUPPORT (OUTPATIENT)
Dept: FAMILY MEDICINE | Facility: CLINIC | Age: 70
End: 2020-03-17
Payer: MEDICARE

## 2020-03-17 ENCOUNTER — APPOINTMENT (OUTPATIENT)
Dept: LAB | Facility: HOSPITAL | Age: 70
End: 2020-03-17
Attending: INTERNAL MEDICINE
Payer: MEDICARE

## 2020-03-17 DIAGNOSIS — E11.59 HYPERTENSION ASSOCIATED WITH DIABETES: ICD-10-CM

## 2020-03-17 DIAGNOSIS — I15.2 HYPERTENSION ASSOCIATED WITH DIABETES: ICD-10-CM

## 2020-03-17 DIAGNOSIS — E11.69 HYPERLIPIDEMIA ASSOCIATED WITH TYPE 2 DIABETES MELLITUS: ICD-10-CM

## 2020-03-17 DIAGNOSIS — E78.5 HYPERLIPIDEMIA ASSOCIATED WITH TYPE 2 DIABETES MELLITUS: ICD-10-CM

## 2020-03-17 DIAGNOSIS — E11.21 WELL CONTROLLED TYPE 2 DIABETES MELLITUS WITH NEPHROPATHY: ICD-10-CM

## 2020-03-17 LAB
ALBUMIN SERPL BCP-MCNC: 4 G/DL (ref 3.5–5.2)
ALP SERPL-CCNC: 57 U/L (ref 55–135)
ALT SERPL W/O P-5'-P-CCNC: 17 U/L (ref 10–44)
ANION GAP SERPL CALC-SCNC: 11 MMOL/L (ref 8–16)
AST SERPL-CCNC: 15 U/L (ref 10–40)
BASOPHILS # BLD AUTO: 0.04 K/UL (ref 0–0.2)
BASOPHILS NFR BLD: 0.7 % (ref 0–1.9)
BILIRUB SERPL-MCNC: 0.4 MG/DL (ref 0.1–1)
BUN SERPL-MCNC: 6 MG/DL (ref 8–23)
CALCIUM SERPL-MCNC: 9.5 MG/DL (ref 8.7–10.5)
CHLORIDE SERPL-SCNC: 98 MMOL/L (ref 95–110)
CHOLEST SERPL-MCNC: 107 MG/DL (ref 120–199)
CHOLEST/HDLC SERPL: 2.8 {RATIO} (ref 2–5)
CO2 SERPL-SCNC: 28 MMOL/L (ref 23–29)
CREAT SERPL-MCNC: 0.9 MG/DL (ref 0.5–1.4)
DIFFERENTIAL METHOD: ABNORMAL
EOSINOPHIL # BLD AUTO: 0.2 K/UL (ref 0–0.5)
EOSINOPHIL NFR BLD: 4.1 % (ref 0–8)
ERYTHROCYTE [DISTWIDTH] IN BLOOD BY AUTOMATED COUNT: 13.2 % (ref 11.5–14.5)
EST. GFR  (AFRICAN AMERICAN): >60 ML/MIN/1.73 M^2
EST. GFR  (NON AFRICAN AMERICAN): >60 ML/MIN/1.73 M^2
GLUCOSE SERPL-MCNC: 104 MG/DL (ref 70–110)
HCT VFR BLD AUTO: 47.5 % (ref 40–54)
HDLC SERPL-MCNC: 38 MG/DL (ref 40–75)
HDLC SERPL: 35.5 % (ref 20–50)
HGB BLD-MCNC: 15.1 G/DL (ref 14–18)
IMM GRANULOCYTES # BLD AUTO: 0.03 K/UL (ref 0–0.04)
IMM GRANULOCYTES NFR BLD AUTO: 0.5 % (ref 0–0.5)
LDLC SERPL CALC-MCNC: 42.6 MG/DL (ref 63–159)
LYMPHOCYTES # BLD AUTO: 1.2 K/UL (ref 1–4.8)
LYMPHOCYTES NFR BLD: 21.1 % (ref 18–48)
MCH RBC QN AUTO: 30.1 PG (ref 27–31)
MCHC RBC AUTO-ENTMCNC: 31.8 G/DL (ref 32–36)
MCV RBC AUTO: 95 FL (ref 82–98)
MONOCYTES # BLD AUTO: 0.6 K/UL (ref 0.3–1)
MONOCYTES NFR BLD: 10.2 % (ref 4–15)
NEUTROPHILS # BLD AUTO: 3.6 K/UL (ref 1.8–7.7)
NEUTROPHILS NFR BLD: 63.4 % (ref 38–73)
NONHDLC SERPL-MCNC: 69 MG/DL
NRBC BLD-RTO: 0 /100 WBC
PLATELET # BLD AUTO: 176 K/UL (ref 150–350)
PMV BLD AUTO: 10.8 FL (ref 9.2–12.9)
POTASSIUM SERPL-SCNC: 4.5 MMOL/L (ref 3.5–5.1)
PROT SERPL-MCNC: 6.9 G/DL (ref 6–8.4)
RBC # BLD AUTO: 5.02 M/UL (ref 4.6–6.2)
SODIUM SERPL-SCNC: 137 MMOL/L (ref 136–145)
TRIGL SERPL-MCNC: 132 MG/DL (ref 30–150)
WBC # BLD AUTO: 5.6 K/UL (ref 3.9–12.7)

## 2020-03-17 PROCEDURE — 80053 COMPREHEN METABOLIC PANEL: CPT

## 2020-03-17 PROCEDURE — 80061 LIPID PANEL: CPT

## 2020-03-17 PROCEDURE — 85025 COMPLETE CBC W/AUTO DIFF WBC: CPT

## 2020-03-17 PROCEDURE — 83036 HEMOGLOBIN GLYCOSYLATED A1C: CPT

## 2020-03-17 NOTE — PROGRESS NOTES
Normal labs, more to come.   Dr. Rey has reviewed your result, let us know if you have any problems

## 2020-03-18 LAB
ESTIMATED AVG GLUCOSE: 108 MG/DL (ref 68–131)
HBA1C MFR BLD HPLC: 5.4 % (ref 4–5.6)

## 2020-03-18 NOTE — PROGRESS NOTES
Sugars are under good control.   Dr. Rey has reviewed your labs, let us know if you have any problems

## 2020-03-22 DIAGNOSIS — E11.628 TYPE 2 DIABETES, CONTROLLED, WITH CELLULITIS OF FOOT: ICD-10-CM

## 2020-03-22 DIAGNOSIS — L03.119 TYPE 2 DIABETES, CONTROLLED, WITH CELLULITIS OF FOOT: ICD-10-CM

## 2020-03-23 RX ORDER — METFORMIN HYDROCHLORIDE 1000 MG/1
TABLET ORAL
Qty: 90 TABLET | Refills: 0 | Status: SHIPPED | OUTPATIENT
Start: 2020-03-23 | End: 2020-03-24 | Stop reason: SDUPTHER

## 2020-03-24 ENCOUNTER — OFFICE VISIT (OUTPATIENT)
Dept: FAMILY MEDICINE | Facility: CLINIC | Age: 70
End: 2020-03-24
Payer: MEDICARE

## 2020-03-24 VITALS
HEIGHT: 71 IN | BODY MASS INDEX: 29.14 KG/M2 | SYSTOLIC BLOOD PRESSURE: 132 MMHG | RESPIRATION RATE: 20 BRPM | HEART RATE: 61 BPM | TEMPERATURE: 98 F | DIASTOLIC BLOOD PRESSURE: 76 MMHG | OXYGEN SATURATION: 94 % | WEIGHT: 208.13 LBS

## 2020-03-24 DIAGNOSIS — E11.69 HYPERLIPIDEMIA ASSOCIATED WITH TYPE 2 DIABETES MELLITUS: ICD-10-CM

## 2020-03-24 DIAGNOSIS — Z12.11 COLON CANCER SCREENING: ICD-10-CM

## 2020-03-24 DIAGNOSIS — E11.59 HYPERTENSION ASSOCIATED WITH DIABETES: ICD-10-CM

## 2020-03-24 DIAGNOSIS — E78.5 HYPERLIPIDEMIA ASSOCIATED WITH TYPE 2 DIABETES MELLITUS: ICD-10-CM

## 2020-03-24 DIAGNOSIS — I48.0 PAROXYSMAL ATRIAL FIBRILLATION: ICD-10-CM

## 2020-03-24 DIAGNOSIS — L03.119 TYPE 2 DIABETES, CONTROLLED, WITH CELLULITIS OF FOOT: ICD-10-CM

## 2020-03-24 DIAGNOSIS — N52.9 ERECTILE DYSFUNCTION, UNSPECIFIED ERECTILE DYSFUNCTION TYPE: ICD-10-CM

## 2020-03-24 DIAGNOSIS — I15.2 HYPERTENSION ASSOCIATED WITH DIABETES: ICD-10-CM

## 2020-03-24 DIAGNOSIS — C44.90 SKIN CANCER: ICD-10-CM

## 2020-03-24 DIAGNOSIS — Z77.090 ASBESTOS EXPOSURE: ICD-10-CM

## 2020-03-24 DIAGNOSIS — J44.9 CHRONIC OBSTRUCTIVE PULMONARY DISEASE, UNSPECIFIED COPD TYPE: ICD-10-CM

## 2020-03-24 DIAGNOSIS — E11.21 WELL CONTROLLED TYPE 2 DIABETES MELLITUS WITH NEPHROPATHY: Primary | ICD-10-CM

## 2020-03-24 DIAGNOSIS — E11.628 TYPE 2 DIABETES, CONTROLLED, WITH CELLULITIS OF FOOT: ICD-10-CM

## 2020-03-24 PROCEDURE — 99214 PR OFFICE/OUTPT VISIT, EST, LEVL IV, 30-39 MIN: ICD-10-PCS | Mod: S$GLB,,, | Performed by: INTERNAL MEDICINE

## 2020-03-24 PROCEDURE — 1101F PR PT FALLS ASSESS DOC 0-1 FALLS W/OUT INJ PAST YR: ICD-10-PCS | Mod: CPTII,S$GLB,, | Performed by: INTERNAL MEDICINE

## 2020-03-24 PROCEDURE — 3078F DIAST BP <80 MM HG: CPT | Mod: CPTII,S$GLB,, | Performed by: INTERNAL MEDICINE

## 2020-03-24 PROCEDURE — 1159F PR MEDICATION LIST DOCUMENTED IN MEDICAL RECORD: ICD-10-PCS | Mod: S$GLB,,, | Performed by: INTERNAL MEDICINE

## 2020-03-24 PROCEDURE — 1126F AMNT PAIN NOTED NONE PRSNT: CPT | Mod: S$GLB,,, | Performed by: INTERNAL MEDICINE

## 2020-03-24 PROCEDURE — 3075F SYST BP GE 130 - 139MM HG: CPT | Mod: CPTII,S$GLB,, | Performed by: INTERNAL MEDICINE

## 2020-03-24 PROCEDURE — 99499 RISK ADDL DX/OHS AUDIT: ICD-10-PCS | Mod: S$GLB,,, | Performed by: INTERNAL MEDICINE

## 2020-03-24 PROCEDURE — 1159F MED LIST DOCD IN RCRD: CPT | Mod: S$GLB,,, | Performed by: INTERNAL MEDICINE

## 2020-03-24 PROCEDURE — 3078F PR MOST RECENT DIASTOLIC BLOOD PRESSURE < 80 MM HG: ICD-10-PCS | Mod: CPTII,S$GLB,, | Performed by: INTERNAL MEDICINE

## 2020-03-24 PROCEDURE — 99214 OFFICE O/P EST MOD 30 MIN: CPT | Mod: S$GLB,,, | Performed by: INTERNAL MEDICINE

## 2020-03-24 PROCEDURE — 99499 UNLISTED E&M SERVICE: CPT | Mod: S$GLB,,, | Performed by: INTERNAL MEDICINE

## 2020-03-24 PROCEDURE — 3044F HG A1C LEVEL LT 7.0%: CPT | Mod: CPTII,S$GLB,, | Performed by: INTERNAL MEDICINE

## 2020-03-24 PROCEDURE — 3075F PR MOST RECENT SYSTOLIC BLOOD PRESS GE 130-139MM HG: ICD-10-PCS | Mod: CPTII,S$GLB,, | Performed by: INTERNAL MEDICINE

## 2020-03-24 PROCEDURE — 1101F PT FALLS ASSESS-DOCD LE1/YR: CPT | Mod: CPTII,S$GLB,, | Performed by: INTERNAL MEDICINE

## 2020-03-24 PROCEDURE — 1126F PR PAIN SEVERITY QUANTIFIED, NO PAIN PRESENT: ICD-10-PCS | Mod: S$GLB,,, | Performed by: INTERNAL MEDICINE

## 2020-03-24 PROCEDURE — 3044F PR MOST RECENT HEMOGLOBIN A1C LEVEL <7.0%: ICD-10-PCS | Mod: CPTII,S$GLB,, | Performed by: INTERNAL MEDICINE

## 2020-03-24 RX ORDER — ATORVASTATIN CALCIUM 20 MG/1
20 TABLET, FILM COATED ORAL DAILY
Qty: 90 TABLET | Refills: 3 | Status: SHIPPED | OUTPATIENT
Start: 2020-03-24 | End: 2021-04-15

## 2020-03-24 RX ORDER — LISINOPRIL 5 MG/1
5 TABLET ORAL DAILY
Qty: 90 TABLET | Refills: 3 | Status: SHIPPED | OUTPATIENT
Start: 2020-03-24 | End: 2020-09-25

## 2020-03-24 RX ORDER — METFORMIN HYDROCHLORIDE 1000 MG/1
1000 TABLET ORAL 2 TIMES DAILY
Qty: 90 TABLET | Refills: 0 | Status: SHIPPED | OUTPATIENT
Start: 2020-03-24 | End: 2020-06-15

## 2020-03-24 RX ORDER — SILDENAFIL CITRATE 20 MG/1
TABLET ORAL
Qty: 30 TABLET | Refills: 11 | Status: SHIPPED | OUTPATIENT
Start: 2020-03-24 | End: 2020-09-25 | Stop reason: SDUPTHER

## 2020-03-24 NOTE — PROGRESS NOTES
Subjective:      8:24 AM     Patient ID: Umang Goldberg is a 69 y.o. male.    Chief Complaint: Diabetes (labs,refills)    HPI         CHIEF COMPLAINT: Diabetes.  HPI:     ONSET/TIMING:     QUALITY/COURSE:   unchanged..      INTENSITY/SEVERITY: . Measures blood sugar :  3 times per month.        Lowest recent BS 90. Average .     CONTEXT/WHEN: last HgbA1c    Lab Results   Component Value Date    HGBA1C 5.4 03/17/2020      weights:    Wt Readings from Last 1 Encounters:   03/24/20 0810 94.4 kg (208 lb 1.8 oz)         SYMPTOMS/RELATED: . . Possible medication side effects include:     The following symptoms/statements  are present IF IN BOLD, negative otherwise.         MODIFIERS/TREATMENTS: Not_taking_medications:  .  Non-compliance_with_Diabetic_diet  .  No_eye_exam_within_last_year.  Not_practicing_good_foot_care.    REVIEW OF SYMPTOMS: . Weight_gain. Weight_loss. Neuropathy. Recurrent_infections. Skin_ulcers        CHIEF COMPLAINT: Hypertension  HPI:     ONSET:      QUALITY/COURSE:   Unchanged.     INTENSITY/SEVERITY:  Average blood pressure is unknown.      MODIFIERS/TREATMENTS:  Taking medications: yes. .High sodium intake: no. alcohol: no      The following symptoms are positive only if BOLDED, otherwise are negative.      SYMPTOMS/RELATED: Possible medication side effects include:   Depression..  . Cough. . Constipation.    REVIEW OF SYMPTOMS: . Weight_loss . Weight_gain . Leg_cramps .Potency_problems .    TARGET ORGAN DAMAGE:: angina/ prior myocardial infarction, chronic kidney disease, heart failure, left ventricular hypertrophy, peripheral artery disease, prior coronary revascularization, retinopathy, stroke. transient ischemic attack.         CHIEF COMPLAINT: Hyperlipidemia. cholesterol screening: no.   HPI:     ONSET:    MODIFIERS/TREATMENTS: . Taking medications: yes. . Non-compliance with following diet: no. .     SYMPTOMS/RELATED:Possible medication side effects include:   Myalgia: no.  .     REVIEW  "OF SYMPTOMS: past weights:   Wt Readings from Last 1 Encounters:   03/24/20 0810 94.4 kg (208 lb 1.8 oz)                                                     Last lipids: total   Lab Results   Component Value Date    CHOL 107 (L) 03/17/2020    CHOL 124 09/17/2019    CHOL 141 06/17/2019                                                                     HDL   Lab Results   Component Value Date    HDL 38 (L) 03/17/2020    HDL 35 (L) 09/17/2019    HDL 36 (L) 06/17/2019                                                                     LDL   Lab Results   Component Value Date    LDLCALC 42.6 (L) 03/17/2020    LDLCALC 49.4 (L) 09/17/2019    LDLCALC 55.6 (L) 06/17/2019                                                                     TRIG   Lab Results   Component Value Date    TRIG 132 03/17/2020    TRIG 198 (H) 09/17/2019    TRIG 247 (H) 06/17/2019                                                                       The patient has a history of paroxysmal atrial fibrillation and is on Eliquis.  He has not had any bleeding.  He has had no stroke symptoms.    The patient was on Chantix but but it became too expensive for him and it did not work.  The patient is still smoking a pack a day and has a history of asbestosis.  He also has COPD but he is at his baseline as far as breathing.    Patient has a history of skin cancer.  He has 2 lesions on the left a thigh that have been there for 3 months and are not going away.    tillReview of Systems      Objective:      Vitals:    03/24/20 0810   BP: 132/76   Pulse: 61   Resp: 20   Temp: 97.9 °F (36.6 °C)   TempSrc: Oral   SpO2: (!) 94%   Weight: 94.4 kg (208 lb 1.8 oz)   Height: 5' 11" (1.803 m)   PainSc: 0-No pain     Physical Exam   Constitutional: He appears well-developed and well-nourished.   Cardiovascular: Normal rate, regular rhythm and normal heart sounds.   Pulmonary/Chest: Effort normal and breath sounds normal.   Abdominal: Soft. There is no tenderness. "   Neurological: He is alert.   Skin:        Psychiatric: He has a normal mood and affect. His behavior is normal. Thought content normal.   Nursing note and vitals reviewed.        Assessment:       1. 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 Hypertension associated with diabetes    3. Hyperlipidemia associated with type 2 diabetes mellitus    4. Paroxysmal atrial fibrillation    5. Skin cancer    6. Asbestos exposure    7. Colon cancer screening    8. Type 2 diabetes, controlled, with cellulitis of foot    9. 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  Dispense: 90 tablet; Refill: 3    Hypertension associated with diabetes  -     Comprehensive metabolic panel; Future; Expected date: 03/24/2020    Hyperlipidemia associated with type 2 diabetes mellitus  -     Hemoglobin A1c; Future; Expected date: 03/24/2020  -     atorvastatin (LIPITOR) 20 MG tablet; Take 1 tablet (20 mg total) by mouth once daily.  Dispense: 90 tablet; Refill: 3    Paroxysmal atrial fibrillation  -     CBC auto differential; Future; Expected date: 03/24/2020    Skin cancer  -     Ambulatory referral/consult to Dermatology; Future; Expected date: 03/31/2020    Asbestos exposure  -     X-Ray Chest PA And Lateral; Future; Expected date: 03/24/2020    Colon cancer screening  -     Fecal Immunochemical Test (iFOBT); Future; Expected date: 03/24/2020    Type 2 diabetes, controlled, with cellulitis of foot  -     metFORMIN (GLUCOPHAGE) 1000 MG  tablet; Take 1 tablet (1,000 mg total) by mouth 2 (two) times daily.  Dispense: 90 tablet; Refill: 0    Chronic obstructive pulmonary disease, unspecified COPD type      Follow up in about 6 months (around 9/24/2020).

## 2020-03-24 NOTE — PATIENT INSTRUCTIONS
Lose 5 pounds.  Suggest [place] diet  Avoid white carbohydrates.   Eat  a palm sized protein with each meal.       Walk for 10 minutes after you eat.  This will keep your sugars from going high at that time.      Thank you for choosing [facility].     Please fill out the patient experience survey.

## 2020-03-26 ENCOUNTER — TELEPHONE (OUTPATIENT)
Dept: DERMATOLOGY | Facility: CLINIC | Age: 70
End: 2020-03-26

## 2020-03-26 NOTE — TELEPHONE ENCOUNTER
----- Message from Cristian Lopez sent at 3/26/2020  3:22 PM CDT -----  Contact: Pt  Pt called and said that he missed a call from your office    Pt can be reached at [phone]

## 2020-03-26 NOTE — TELEPHONE ENCOUNTER
Left patient a voicemail regarding their appointment and told them to give the clinic a call back. DLB CCMA

## 2020-03-27 ENCOUNTER — DOCUMENTATION ONLY (OUTPATIENT)
Dept: FAMILY MEDICINE | Facility: CLINIC | Age: 70
End: 2020-03-27

## 2020-05-05 ENCOUNTER — PATIENT MESSAGE (OUTPATIENT)
Dept: ADMINISTRATIVE | Facility: HOSPITAL | Age: 70
End: 2020-05-05

## 2020-06-12 ENCOUNTER — TELEPHONE (OUTPATIENT)
Dept: FAMILY MEDICINE | Facility: CLINIC | Age: 70
End: 2020-06-12

## 2020-06-12 NOTE — TELEPHONE ENCOUNTER
----- Message from Foreign Abbasi sent at 6/12/2020  1:58 PM CDT -----  Contact: same  Type:  RX Refill Request    Who Called:  patient  Refill or New Rx:  new  RX Name and Strength:  metFORMIN (GLUCOPHAGE) 1000 MG tablet  How is the patient currently taking it? (ex. 1XDay):   Take 1 tablet (1,000 mg total) by mouth 2 (two) times daily. - Oral  Is this a 30 day or 90 day RX:  90 tablet 0 3/24/2020   Preferred Pharmacy with phone number:    [facility] #[id number] - [place] - [address] [facility]  [address] [facility]  [place] [address]  Phone: [phone] Fax: [phone]    Ordering Provider:  Candido Pena Call Back Number:  [phone]  Additional Information:  n/a

## 2020-06-17 ENCOUNTER — PATIENT OUTREACH (OUTPATIENT)
Dept: ADMINISTRATIVE | Facility: OTHER | Age: 70
End: 2020-06-17

## 2020-06-18 ENCOUNTER — OFFICE VISIT (OUTPATIENT)
Dept: DERMATOLOGY | Facility: CLINIC | Age: 70
End: 2020-06-18
Payer: MEDICARE

## 2020-06-18 ENCOUNTER — HOSPITAL ENCOUNTER (OUTPATIENT)
Dept: RADIOLOGY | Facility: HOSPITAL | Age: 70
Discharge: HOME | End: 2020-06-18
Attending: INTERNAL MEDICINE
Payer: MEDICARE

## 2020-06-18 VITALS — HEIGHT: 71 IN | BODY MASS INDEX: 29.03 KG/M2

## 2020-06-18 DIAGNOSIS — Z85.828 HISTORY OF NONMELANOMA SKIN CANCER: ICD-10-CM

## 2020-06-18 DIAGNOSIS — D22.9 MULTIPLE BENIGN NEVI: ICD-10-CM

## 2020-06-18 DIAGNOSIS — D48.5 NEOPLASM OF UNCERTAIN BEHAVIOR OF SKIN: ICD-10-CM

## 2020-06-18 DIAGNOSIS — L81.4 SOLAR LENTIGO: ICD-10-CM

## 2020-06-18 DIAGNOSIS — L82.1 SEBORRHEIC KERATOSES: Primary | ICD-10-CM

## 2020-06-18 DIAGNOSIS — D18.01 CHERRY ANGIOMA: ICD-10-CM

## 2020-06-18 DIAGNOSIS — Z77.090 ASBESTOS EXPOSURE: ICD-10-CM

## 2020-06-18 PROCEDURE — 11102 PR TANGENTIAL BIOPSY, SKIN, SINGLE LESION: ICD-10-PCS | Mod: S$GLB,,, | Performed by: DERMATOLOGY

## 2020-06-18 PROCEDURE — 88342 IMHCHEM/IMCYTCHM 1ST ANTB: CPT | Mod: 26,,, | Performed by: PATHOLOGY

## 2020-06-18 PROCEDURE — 1159F PR MEDICATION LIST DOCUMENTED IN MEDICAL RECORD: ICD-10-PCS | Mod: S$GLB,,, | Performed by: DERMATOLOGY

## 2020-06-18 PROCEDURE — 88305 TISSUE EXAM BY PATHOLOGIST: CPT | Mod: 26,,, | Performed by: PATHOLOGY

## 2020-06-18 PROCEDURE — 1101F PT FALLS ASSESS-DOCD LE1/YR: CPT | Mod: CPTII,S$GLB,, | Performed by: DERMATOLOGY

## 2020-06-18 PROCEDURE — 1101F PR PT FALLS ASSESS DOC 0-1 FALLS W/OUT INJ PAST YR: ICD-10-PCS | Mod: CPTII,S$GLB,, | Performed by: DERMATOLOGY

## 2020-06-18 PROCEDURE — 1159F MED LIST DOCD IN RCRD: CPT | Mod: S$GLB,,, | Performed by: DERMATOLOGY

## 2020-06-18 PROCEDURE — 1126F PR PAIN SEVERITY QUANTIFIED, NO PAIN PRESENT: ICD-10-PCS | Mod: S$GLB,,, | Performed by: DERMATOLOGY

## 2020-06-18 PROCEDURE — 99213 OFFICE O/P EST LOW 20 MIN: CPT | Mod: 25,S$GLB,, | Performed by: DERMATOLOGY

## 2020-06-18 PROCEDURE — 1126F AMNT PAIN NOTED NONE PRSNT: CPT | Mod: S$GLB,,, | Performed by: DERMATOLOGY

## 2020-06-18 PROCEDURE — 71046 XR CHEST PA AND LATERAL: ICD-10-PCS | Mod: 26,,, | Performed by: RADIOLOGY

## 2020-06-18 PROCEDURE — 99213 PR OFFICE/OUTPT VISIT, EST, LEVL III, 20-29 MIN: ICD-10-PCS | Mod: 25,S$GLB,, | Performed by: DERMATOLOGY

## 2020-06-18 PROCEDURE — 99999 PR PBB SHADOW E&M-EST. PATIENT-LVL IV: CPT | Mod: PBBFAC,,, | Performed by: DERMATOLOGY

## 2020-06-18 PROCEDURE — 88342 CHG IMMUNOCYTOCHEMISTRY: ICD-10-PCS | Mod: 26,,, | Performed by: PATHOLOGY

## 2020-06-18 PROCEDURE — 71046 X-RAY EXAM CHEST 2 VIEWS: CPT | Mod: 26,,, | Performed by: RADIOLOGY

## 2020-06-18 PROCEDURE — 71046 X-RAY EXAM CHEST 2 VIEWS: CPT | Mod: TC,FY

## 2020-06-18 PROCEDURE — 99999 PR PBB SHADOW E&M-EST. PATIENT-LVL IV: ICD-10-PCS | Mod: PBBFAC,,, | Performed by: DERMATOLOGY

## 2020-06-18 PROCEDURE — 88342 IMHCHEM/IMCYTCHM 1ST ANTB: CPT | Performed by: PATHOLOGY

## 2020-06-18 PROCEDURE — 88305 TISSUE EXAM BY PATHOLOGIST: CPT | Performed by: PATHOLOGY

## 2020-06-18 PROCEDURE — 11102 TANGNTL BX SKIN SINGLE LES: CPT | Mod: S$GLB,,, | Performed by: DERMATOLOGY

## 2020-06-18 PROCEDURE — 88305 TISSUE EXAM BY PATHOLOGIST: ICD-10-PCS | Mod: 26,,, | Performed by: PATHOLOGY

## 2020-06-18 NOTE — PATIENT INSTRUCTIONS
Shave Biopsy Wound Care    Your doctor has performed a shave biopsy today.  A band aid and vaseline ointment has been placed over the site.  This should remain in place for 24 hours.  It is recommended that you keep the area dry for the first 24 hours.  After 24 hours, you may remove the band aid and wash the area with warm soap and water and apply Vaseline jelly.  Many patients prefer to use Neosporin or Bacitracin ointment.  This is acceptable; however, know that you can develop an allergy to this medication even if you have used it safely for years.  It is important to keep the area moist.  Letting it dry out and get air slows healing time, and will worsen the scar.  Band aid is optional after first 24 hours.      If you notice increasing redness, tenderness, pain, or yellow drainage at the biopsy site, please notify your doctor.  These are signs of an infection.    If your biopsy site is bleeding, apply firm pressure for 15 minutes straight.  Repeat for another 15 minutes, if it is still bleeding.   If the surgical site continues to bleed, then please contact your doctor.       [facility]  [facility]ERMATOLOGY  [address] [place] [address], [address] [address]  Dept: [phone]

## 2020-06-18 NOTE — PROGRESS NOTES
Subjective:       Patient ID:  Umang Goldberg is a 70 y.o. male who presents for   Chief Complaint   Patient presents with    Growth    Skin Check     Patient last seen 6/21/2018 AKs treated with cryo    Pt here today for growth on left thigh, x 6-8 months, raised and scaly, no tx  Request TBSE for cancer screening    left back- HYPERTROPHIC ACTINIC KERATOSIS WITH FOCAL TRANSITION TO SQUAMOUS CELL CARCINOMA IN  SITU.    + FH psoriasis  Outdoor employment , now retired  Recreational exposure, cuts his grass      Review of Systems   Constitutional: Negative for fever, chills and fatigue.   Skin: Positive for wears hat. Negative for daily sunscreen use and activity-related sunscreen use.   Hematologic/Lymphatic: Does not bruise/bleed easily.        Objective:    Physical Exam   Constitutional: He appears well-developed and well-nourished. No distress.   Neurological: He is alert and oriented to person, place, and time. He is not disoriented.   Psychiatric: He has a normal mood and affect.   Skin:   Areas Examined (abnormalities noted in diagram):   Scalp / Hair Palpated and Inspected  Head / Face Inspection Performed  Neck Inspection Performed  Chest / Axilla Inspection Performed  Back Inspection Performed  RUE Inspected  LUE Inspection Performed                       Diagram Legend     Erythematous scaling macule/papule c/w actinic keratosis       Vascular papule c/w angioma      Pigmented verrucoid papule/plaque c/w seborrheic keratosis      Yellow umbilicated papule c/w sebaceous hyperplasia      Irregularly shaped tan macule c/w lentigo     1-2 mm smooth white papules consistent with Milia      Movable subcutaneous cyst with punctum c/w epidermal inclusion cyst      Subcutaneous movable cyst c/w pilar cyst      Firm pink to brown papule c/w dermatofibroma      Pedunculated fleshy papule(s) c/w skin tag(s)      Evenly pigmented macule c/w junctional nevus     Mildly variegated pigmented, slightly  irregular-bordered macule c/w mildly atypical nevus      Flesh colored to evenly pigmented papule c/w intradermal nevus       Pink pearly papule/plaque c/w basal cell carcinoma      Erythematous hyperkeratotic cursted plaque c/w SCC      Surgical scar with no sign of skin cancer recurrence      Open and closed comedones      Inflammatory papules and pustules      Verrucoid papule consistent consistent with wart     Erythematous eczematous patches and plaques     Dystrophic onycholytic nail with subungual debris c/w onychomycosis     Umbilicated papule    Erythematous-base heme-crusted tan verrucoid plaque consistent with inflamed seborrheic keratosis     Erythematous Silvery Scaling Plaque c/w Psoriasis     See annotation          Assessment / Plan:      Pathology Orders:     Normal Orders This Visit    Specimen to Pathology, Dermatology     Comments:    Number of Specimens:->1  ------------------------->-------------------------  Spec 1 Procedure:->Biopsy  Spec 1 Clinical Impression:->Hyperkeratotic warty papule, ISK  vs VV vs SCC  Spec 1 Source:->left distal thigh    Questions:    Procedure Type: Dermatology and skin neoplasms    Number of Specimens: 1    ------------------------: -------------------------    Spec 1 Procedure: Biopsy    Spec 1 Clinical Impression: Hyperkeratotic warty papule, ISK vs VV vs SCC    Spec 1 Source: left distal thigh        Seborrheic keratoses  These are benign inherited growths without a malignant potential. Reassurance given to patient. No treatment is necessary.     Neoplasm of uncertain behavior of skin  -     Ambulatory referral/consult to Dermatology  -     Specimen to Pathology, Dermatology  Shave biopsy procedure note:    Shave biopsy performed after verbal consent including risk of infection, scar, recurrence, need for additional treatment of site. Area prepped with alcohol, anesthetized with approximately 1.0cc of 1% lidocaine with epinephrine. Lesional tissue shaved with  razor blade. Hemostasis achieved with application of aluminum chloride followed by hyfrecation. No complications. Dressing applied. Wound care explained.    Solar lentigo  This is a benign hyperpigmented sun induced lesion. Daily sun protection will reduce the number of new lesions. Treatment of these benign lesions are considered cosmetic.    Multiple benign nevi  Discussed ABCDE's of nevi.  Monitor for new mole or moles that are becoming bigger, darker, irritated, or developing irregular borders. Brochure provided.    Cherry angioma  This is a benign vascular lesion. Reassurance given. No treatment required.     History of nonmelanoma skin cancer  Area of previous NMSC (left upper back AK/SCCIS) examined. Site well healed with no signs of recurrence.  Upper body skin examination performed today including at least 9 points as noted in physical examination.     Patient instructed in importance in daily sun protection of at least spf 30. Mineral sunscreen ingredients preferred (Zinc +/- Titanium).   Recommend Elta MD for daily use on face and neck.  Patient encouraged to wear hat for all outdoor exposure.   Also discussed sun avoidance and use of protective clothing.           Follow up in about 1 year (around 6/18/2021), or if symptoms worsen or fail to improve.

## 2020-06-18 NOTE — LETTER
June 18, 2020      Jc Rey MD  [address]  [facility] [address]           [address] [address]  Phone: [phone]          Patient: Umang Goldberg   MR Number: [id number]   YOB: 1950   Date of Visit: 6/18/2020       Dear Dr. Jc Rey:    Thank you for referring Umang Goldberg to me for evaluation. Attached you will find relevant portions of my assessment and plan of care.    If you have questions, please do not hesitate to call me. I look forward to following Umang Goldberg along with you.    Sincerely,    Africa Contreras MD    Enclosure  CC:  No Recipients    If you would like to receive this communication electronically, please contact [email or web address] or [phone] to request more information on [facility] Link access.    For providers and/or their staff who would like to refer a patient to [facility], please contact us through our one-stop-shop provider referral line, [facility] , at [phone].    If you feel you have received this communication in error or would no longer like to receive these types of communications, please e-mail [email or web address]

## 2020-06-23 LAB
FINAL PATHOLOGIC DIAGNOSIS: NORMAL
GROSS: NORMAL

## 2020-07-27 ENCOUNTER — PATIENT OUTREACH (OUTPATIENT)
Dept: ADMINISTRATIVE | Facility: HOSPITAL | Age: 70
End: 2020-07-27

## 2020-07-27 NOTE — PROGRESS NOTES
COLORECTAL CANCER SCREENING gap report review. HM, chart, care everywhere, media reviewed.    Dr. Rey indicated during a visit in 2017 that the patient had a colonoscopy with a Dr. Singer in 2013. No record of this can be found. Message sent to pt requesting details so that records may be requested.

## 2020-08-04 ENCOUNTER — TELEPHONE (OUTPATIENT)
Dept: FAMILY MEDICINE | Facility: CLINIC | Age: 70
End: 2020-08-04

## 2020-08-04 DIAGNOSIS — R60.0 BILATERAL LEG EDEMA: ICD-10-CM

## 2020-08-04 RX ORDER — FUROSEMIDE 20 MG/1
20 TABLET ORAL DAILY PRN
Qty: 15 TABLET | Refills: 11 | Status: SHIPPED | OUTPATIENT
Start: 2020-08-04 | End: 2021-01-25

## 2020-08-04 NOTE — TELEPHONE ENCOUNTER
----- Message from Maurice Granados sent at 8/4/2020  1:38 PM CDT -----  Regarding: Pt refill  Contact: Pt  Type:  RX Refill Request    Who Called:  pt  Refill or New Rx:  Refill  RX Name and Strength:  furosemide (LASIX) 20 MG tablet  How is the patient currently taking it? (ex. 1XDay):  _  Is this a 30 day or 90 day RX: 30 as needed  Preferred Pharmacy with phone number:    [facility]/pharmacy #[id number] - [place] - [address]  [address] [address]  Phone: [phone] Fax: [phone]  Local or Mail Order:  Local  Ordering Provider:  Jc Pena Call Back Number:  [phone]  Additional Information:  Please send over refill

## 2020-09-22 ENCOUNTER — LAB VISIT (OUTPATIENT)
Dept: LAB | Facility: HOSPITAL | Age: 70
End: 2020-09-22
Attending: INTERNAL MEDICINE
Payer: MEDICARE

## 2020-09-22 DIAGNOSIS — E11.21 WELL CONTROLLED TYPE 2 DIABETES MELLITUS WITH NEPHROPATHY: ICD-10-CM

## 2020-09-22 DIAGNOSIS — E11.69 HYPERLIPIDEMIA ASSOCIATED WITH TYPE 2 DIABETES MELLITUS: ICD-10-CM

## 2020-09-22 DIAGNOSIS — I48.0 PAROXYSMAL ATRIAL FIBRILLATION: ICD-10-CM

## 2020-09-22 DIAGNOSIS — I15.2 HYPERTENSION ASSOCIATED WITH DIABETES: ICD-10-CM

## 2020-09-22 DIAGNOSIS — E78.5 HYPERLIPIDEMIA ASSOCIATED WITH TYPE 2 DIABETES MELLITUS: ICD-10-CM

## 2020-09-22 DIAGNOSIS — E11.59 HYPERTENSION ASSOCIATED WITH DIABETES: ICD-10-CM

## 2020-09-22 LAB
ALBUMIN SERPL BCP-MCNC: 3.8 G/DL (ref 3.5–5.2)
ALBUMIN/CREAT UR: 70.8 UG/MG (ref 0–30)
ALP SERPL-CCNC: 50 U/L (ref 55–135)
ALT SERPL W/O P-5'-P-CCNC: 18 U/L (ref 10–44)
ANION GAP SERPL CALC-SCNC: 10 MMOL/L (ref 8–16)
AST SERPL-CCNC: 15 U/L (ref 10–40)
BASOPHILS # BLD AUTO: 0.07 K/UL (ref 0–0.2)
BASOPHILS NFR BLD: 1.1 % (ref 0–1.9)
BILIRUB SERPL-MCNC: 0.2 MG/DL (ref 0.1–1)
BUN SERPL-MCNC: 11 MG/DL (ref 8–23)
CALCIUM SERPL-MCNC: 9.1 MG/DL (ref 8.7–10.5)
CHLORIDE SERPL-SCNC: 102 MMOL/L (ref 95–110)
CHOLEST SERPL-MCNC: 115 MG/DL (ref 120–199)
CHOLEST/HDLC SERPL: 4.1 {RATIO} (ref 2–5)
CO2 SERPL-SCNC: 26 MMOL/L (ref 23–29)
CREAT SERPL-MCNC: 1 MG/DL (ref 0.5–1.4)
CREAT UR-MCNC: 106 MG/DL (ref 23–375)
DIFFERENTIAL METHOD: NORMAL
EOSINOPHIL # BLD AUTO: 0.3 K/UL (ref 0–0.5)
EOSINOPHIL NFR BLD: 5.2 % (ref 0–8)
ERYTHROCYTE [DISTWIDTH] IN BLOOD BY AUTOMATED COUNT: 12.7 % (ref 11.5–14.5)
EST. GFR  (AFRICAN AMERICAN): >60 ML/MIN/1.73 M^2
EST. GFR  (NON AFRICAN AMERICAN): >60 ML/MIN/1.73 M^2
ESTIMATED AVG GLUCOSE: 117 MG/DL (ref 68–131)
GLUCOSE SERPL-MCNC: 103 MG/DL (ref 70–110)
HBA1C MFR BLD HPLC: 5.7 % (ref 4–5.6)
HCT VFR BLD AUTO: 44.6 % (ref 40–54)
HDLC SERPL-MCNC: 28 MG/DL (ref 40–75)
HDLC SERPL: 24.3 % (ref 20–50)
HGB BLD-MCNC: 14.3 G/DL (ref 14–18)
IMM GRANULOCYTES # BLD AUTO: 0.03 K/UL (ref 0–0.04)
IMM GRANULOCYTES NFR BLD AUTO: 0.5 % (ref 0–0.5)
LDLC SERPL CALC-MCNC: 49.8 MG/DL (ref 63–159)
LYMPHOCYTES # BLD AUTO: 1.7 K/UL (ref 1–4.8)
LYMPHOCYTES NFR BLD: 26 % (ref 18–48)
MCH RBC QN AUTO: 29.5 PG (ref 27–31)
MCHC RBC AUTO-ENTMCNC: 32.1 G/DL (ref 32–36)
MCV RBC AUTO: 92 FL (ref 82–98)
MICROALBUMIN UR DL<=1MG/L-MCNC: 75 UG/ML
MONOCYTES # BLD AUTO: 0.5 K/UL (ref 0.3–1)
MONOCYTES NFR BLD: 8 % (ref 4–15)
NEUTROPHILS # BLD AUTO: 3.9 K/UL (ref 1.8–7.7)
NEUTROPHILS NFR BLD: 59.2 % (ref 38–73)
NONHDLC SERPL-MCNC: 87 MG/DL
NRBC BLD-RTO: 0 /100 WBC
PLATELET # BLD AUTO: 172 K/UL (ref 150–350)
PMV BLD AUTO: 10.7 FL (ref 9.2–12.9)
POTASSIUM SERPL-SCNC: 4.3 MMOL/L (ref 3.5–5.1)
PROT SERPL-MCNC: 6.6 G/DL (ref 6–8.4)
RBC # BLD AUTO: 4.85 M/UL (ref 4.6–6.2)
SODIUM SERPL-SCNC: 138 MMOL/L (ref 136–145)
TRIGL SERPL-MCNC: 186 MG/DL (ref 30–150)
WBC # BLD AUTO: 6.53 K/UL (ref 3.9–12.7)

## 2020-09-22 PROCEDURE — 80053 COMPREHEN METABOLIC PANEL: CPT

## 2020-09-22 PROCEDURE — 80061 LIPID PANEL: CPT

## 2020-09-22 PROCEDURE — 85025 COMPLETE CBC W/AUTO DIFF WBC: CPT

## 2020-09-22 PROCEDURE — 82043 UR ALBUMIN QUANTITATIVE: CPT

## 2020-09-22 PROCEDURE — 36415 COLL VENOUS BLD VENIPUNCTURE: CPT

## 2020-09-22 PROCEDURE — 83036 HEMOGLOBIN GLYCOSYLATED A1C: CPT

## 2020-09-22 NOTE — PROGRESS NOTES
There is a small amount of protein in your urine.  It indicates some early diabetic kidney disease.  The best thing is to keep your blood pressure down     The hemoglobin A1c is very good.  Sugars are under control    Dr. Rey has reviewed your result, let us know if you have any problems

## 2020-09-25 ENCOUNTER — OFFICE VISIT (OUTPATIENT)
Dept: FAMILY MEDICINE | Facility: CLINIC | Age: 70
End: 2020-09-25
Payer: MEDICARE

## 2020-09-25 VITALS
OXYGEN SATURATION: 96 % | RESPIRATION RATE: 20 BRPM | HEIGHT: 71 IN | HEART RATE: 63 BPM | BODY MASS INDEX: 29.56 KG/M2 | SYSTOLIC BLOOD PRESSURE: 130 MMHG | TEMPERATURE: 98 F | DIASTOLIC BLOOD PRESSURE: 80 MMHG | WEIGHT: 211.19 LBS

## 2020-09-25 DIAGNOSIS — Z79.01 ANTICOAGULATED: ICD-10-CM

## 2020-09-25 DIAGNOSIS — Z23 NEEDS FLU SHOT: ICD-10-CM

## 2020-09-25 DIAGNOSIS — E11.21 WELL CONTROLLED TYPE 2 DIABETES MELLITUS WITH NEPHROPATHY: Primary | ICD-10-CM

## 2020-09-25 DIAGNOSIS — E11.59 HYPERTENSION ASSOCIATED WITH DIABETES: ICD-10-CM

## 2020-09-25 DIAGNOSIS — I15.2 HYPERTENSION ASSOCIATED WITH DIABETES: ICD-10-CM

## 2020-09-25 DIAGNOSIS — N52.9 ERECTILE DYSFUNCTION, UNSPECIFIED ERECTILE DYSFUNCTION TYPE: ICD-10-CM

## 2020-09-25 DIAGNOSIS — E78.5 HYPERLIPIDEMIA ASSOCIATED WITH TYPE 2 DIABETES MELLITUS: ICD-10-CM

## 2020-09-25 DIAGNOSIS — E11.69 HYPERLIPIDEMIA ASSOCIATED WITH TYPE 2 DIABETES MELLITUS: ICD-10-CM

## 2020-09-25 DIAGNOSIS — Z23 NEED FOR VACCINATION WITH 13-POLYVALENT PNEUMOCOCCAL CONJUGATE VACCINE: ICD-10-CM

## 2020-09-25 PROCEDURE — 3008F BODY MASS INDEX DOCD: CPT | Mod: CPTII,S$GLB,, | Performed by: INTERNAL MEDICINE

## 2020-09-25 PROCEDURE — 3008F PR BODY MASS INDEX (BMI) DOCUMENTED: ICD-10-PCS | Mod: CPTII,S$GLB,, | Performed by: INTERNAL MEDICINE

## 2020-09-25 PROCEDURE — 1159F MED LIST DOCD IN RCRD: CPT | Mod: S$GLB,,, | Performed by: INTERNAL MEDICINE

## 2020-09-25 PROCEDURE — 99214 OFFICE O/P EST MOD 30 MIN: CPT | Mod: 25,S$GLB,, | Performed by: INTERNAL MEDICINE

## 2020-09-25 PROCEDURE — 90694 VACC AIIV4 NO PRSRV 0.5ML IM: CPT | Mod: S$GLB,,, | Performed by: INTERNAL MEDICINE

## 2020-09-25 PROCEDURE — 3075F SYST BP GE 130 - 139MM HG: CPT | Mod: CPTII,S$GLB,, | Performed by: INTERNAL MEDICINE

## 2020-09-25 PROCEDURE — G0008 FLU VACCINE - QUADRIVALENT - ADJUVANTED: ICD-10-PCS | Mod: S$GLB,,, | Performed by: INTERNAL MEDICINE

## 2020-09-25 PROCEDURE — 3079F PR MOST RECENT DIASTOLIC BLOOD PRESSURE 80-89 MM HG: ICD-10-PCS | Mod: CPTII,S$GLB,, | Performed by: INTERNAL MEDICINE

## 2020-09-25 PROCEDURE — 90694 FLU VACCINE - QUADRIVALENT - ADJUVANTED: ICD-10-PCS | Mod: S$GLB,,, | Performed by: INTERNAL MEDICINE

## 2020-09-25 PROCEDURE — G0009 PNEUMOCOCCAL CONJUGATE VACCINE 13-VALENT LESS THAN 5YO & GREATER THAN: ICD-10-PCS | Mod: S$GLB,,, | Performed by: INTERNAL MEDICINE

## 2020-09-25 PROCEDURE — 1101F PR PT FALLS ASSESS DOC 0-1 FALLS W/OUT INJ PAST YR: ICD-10-PCS | Mod: CPTII,S$GLB,, | Performed by: INTERNAL MEDICINE

## 2020-09-25 PROCEDURE — 99499 RISK ADDL DX/OHS AUDIT: ICD-10-PCS | Mod: S$GLB,,, | Performed by: INTERNAL MEDICINE

## 2020-09-25 PROCEDURE — 99214 PR OFFICE/OUTPT VISIT, EST, LEVL IV, 30-39 MIN: ICD-10-PCS | Mod: 25,S$GLB,, | Performed by: INTERNAL MEDICINE

## 2020-09-25 PROCEDURE — 90670 PNEUMOCOCCAL CONJUGATE VACCINE 13-VALENT LESS THAN 5YO & GREATER THAN: ICD-10-PCS | Mod: S$GLB,,, | Performed by: INTERNAL MEDICINE

## 2020-09-25 PROCEDURE — G0009 ADMIN PNEUMOCOCCAL VACCINE: HCPCS | Mod: S$GLB,,, | Performed by: INTERNAL MEDICINE

## 2020-09-25 PROCEDURE — 99499 UNLISTED E&M SERVICE: CPT | Mod: S$GLB,,, | Performed by: INTERNAL MEDICINE

## 2020-09-25 PROCEDURE — 3044F HG A1C LEVEL LT 7.0%: CPT | Mod: CPTII,S$GLB,, | Performed by: INTERNAL MEDICINE

## 2020-09-25 PROCEDURE — 1159F PR MEDICATION LIST DOCUMENTED IN MEDICAL RECORD: ICD-10-PCS | Mod: S$GLB,,, | Performed by: INTERNAL MEDICINE

## 2020-09-25 PROCEDURE — 1101F PT FALLS ASSESS-DOCD LE1/YR: CPT | Mod: CPTII,S$GLB,, | Performed by: INTERNAL MEDICINE

## 2020-09-25 PROCEDURE — 1126F PR PAIN SEVERITY QUANTIFIED, NO PAIN PRESENT: ICD-10-PCS | Mod: S$GLB,,, | Performed by: INTERNAL MEDICINE

## 2020-09-25 PROCEDURE — G0008 ADMIN INFLUENZA VIRUS VAC: HCPCS | Mod: S$GLB,,, | Performed by: INTERNAL MEDICINE

## 2020-09-25 PROCEDURE — 3079F DIAST BP 80-89 MM HG: CPT | Mod: CPTII,S$GLB,, | Performed by: INTERNAL MEDICINE

## 2020-09-25 PROCEDURE — 3075F PR MOST RECENT SYSTOLIC BLOOD PRESS GE 130-139MM HG: ICD-10-PCS | Mod: CPTII,S$GLB,, | Performed by: INTERNAL MEDICINE

## 2020-09-25 PROCEDURE — 1126F AMNT PAIN NOTED NONE PRSNT: CPT | Mod: S$GLB,,, | Performed by: INTERNAL MEDICINE

## 2020-09-25 PROCEDURE — 90670 PCV13 VACCINE IM: CPT | Mod: S$GLB,,, | Performed by: INTERNAL MEDICINE

## 2020-09-25 PROCEDURE — 3044F PR MOST RECENT HEMOGLOBIN A1C LEVEL <7.0%: ICD-10-PCS | Mod: CPTII,S$GLB,, | Performed by: INTERNAL MEDICINE

## 2020-09-25 RX ORDER — LISINOPRIL 20 MG/1
20 TABLET ORAL DAILY
Qty: 90 TABLET | Refills: 3 | Status: SHIPPED | OUTPATIENT
Start: 2020-09-25 | End: 2021-08-27 | Stop reason: SDUPTHER

## 2020-09-25 RX ORDER — CALCIUM CARBONATE 300MG(750)
1 TABLET,CHEWABLE ORAL DAILY
COMMUNITY
Start: 2019-10-07 | End: 2022-11-04 | Stop reason: ALTCHOICE

## 2020-09-25 RX ORDER — SILDENAFIL CITRATE 20 MG/1
TABLET ORAL
Qty: 30 TABLET | Refills: 11 | Status: SHIPPED | OUTPATIENT
Start: 2020-09-25 | End: 2021-01-28 | Stop reason: SDUPTHER

## 2020-09-25 NOTE — PROGRESS NOTES
Subjective:      8:22 AM     Patient ID: Umang Goldberg is a 70 y.o. male.    Chief Complaint: Diabetes (labs,refills)    HPI         CHIEF COMPLAINT: Diabetes.  HPI:     ONSET/TIMING:     QUALITY/COURSE:   unchanged..      INTENSITY/SEVERITY: . Measures blood sugar :  3 times per wk.        Lowest recent BS 90. Average .     CONTEXT/WHEN: last HgbA1c    Lab Results   Component Value Date    HGBA1C 5.7 (H) 09/22/2020      weights:    Wt Readings from Last 1 Encounters:   09/25/20 0811 95.8 kg (211 lb 3.2 oz)         SYMPTOMS/RELATED: . . Possible medication side effects include:     The following symptoms/statements  are present IF IN BOLD, negative otherwise.         MODIFIERS/TREATMENTS: Not_taking_medications:  .  Non-compliance_with_Diabetic_diet  .  No_eye_exam_within_last_year.  Not_practicing_good_foot_care.    REVIEW OF SYMPTOMS: . Weight_gain up 3 lbs. Weight_loss. Neuropathy. Recurrent_infections. Skin_ulcers          CHIEF COMPLAINT: Hypertension  HPI:     ONSET:      QUALITY/COURSE:   Unchanged.     INTENSITY/SEVERITY:  Average blood pressure is 130/80.      MODIFIERS/TREATMENTS:  Taking medications: yes. .High sodium intake: no. alcohol: no      The following symptoms are positive only if BOLDED, otherwise are negative.      SYMPTOMS/RELATED: Possible medication side effects include:   Depression..  . Cough. . Constipation.    REVIEW OF SYMPTOMS: . Weight_loss . Weight_gain . Leg_cramps .Potency_problems .    TARGET ORGAN DAMAGE:: angina/ prior myocardial infarction, chronic kidney disease, heart failure, left ventricular hypertrophy, peripheral artery disease, prior coronary revascularization, retinopathy, stroke. transient ischemic attack.        CHIEF COMPLAINT: Hyperlipidemia. cholesterol screening: no.   HPI:     ONSET:    MODIFIERS/TREATMENTS: . Taking medications: yes. . Non-compliance with following diet: no. .     SYMPTOMS/RELATED:Possible medication side effects include:   Myalgia: no.  .  "    REVIEW OF SYMPTOMS: past weights:   Wt Readings from Last 1 Encounters:   09/25/20 0811 95.8 kg (211 lb 3.2 oz)                                                     Last lipids: total   Lab Results   Component Value Date    CHOL 115 (L) 09/22/2020    CHOL 107 (L) 03/17/2020    CHOL 124 09/17/2019                                                                     HDL   Lab Results   Component Value Date    HDL 28 (L) 09/22/2020    HDL 38 (L) 03/17/2020    HDL 35 (L) 09/17/2019                                                                     LDL   Lab Results   Component Value Date    LDLCALC 49.8 (L) 09/22/2020    LDLCALC 42.6 (L) 03/17/2020    LDLCALC 49.4 (L) 09/17/2019                                                                     TRIG   Lab Results   Component Value Date    TRIG 186 (H) 09/22/2020    TRIG 132 03/17/2020    TRIG 198 (H) 09/17/2019                                                                     On eliquis for a-fib, no bleeding or palpitations.       Review of Systems   Constitutional: Negative for diaphoresis, fatigue and unexpected weight change.   Eyes: Negative for visual disturbance.   Respiratory: Negative for chest tightness and shortness of breath.    Cardiovascular: Negative for chest pain and leg swelling.   Endocrine: Negative for polyphagia and polyuria.   Neurological: Negative for dizziness, syncope, weakness, numbness and headaches.   Psychiatric/Behavioral: Negative for dysphoric mood. The patient is not nervous/anxious.          Objective:      Vitals:    09/25/20 0811   BP: 130/80   Pulse: 63   Resp: 20   Temp: 97.7 °F (36.5 °C)   TempSrc: Oral   SpO2: 96%   Weight: 95.8 kg (211 lb 3.2 oz)   Height: 5' 11" (1.803 m)   PainSc: 0-No pain     Physical Exam  Vitals signs and nursing note reviewed.   Constitutional:       Appearance: He is well-developed.   Cardiovascular:      Rate and Rhythm: Normal rate and regular rhythm.      Pulses:           Dorsalis pedis pulses " are 1+ on the right side and 1+ on the left side.      Heart sounds: Normal heart sounds.   Pulmonary:      Effort: Pulmonary effort is normal. No respiratory distress.      Breath sounds: Normal breath sounds. No wheezing.   Abdominal:      General: Bowel sounds are normal.      Palpations: Abdomen is soft.      Tenderness: There is no abdominal tenderness.   Musculoskeletal:      Right foot: Normal range of motion. No deformity.      Left foot: Normal range of motion. No deformity.   Feet:      Right foot:      Protective Sensation: 5 sites tested. 5 sites sensed.      Skin integrity: No ulcer, blister, skin breakdown, erythema, warmth, callus or dry skin.      Left foot:      Protective Sensation: 5 sites tested. 5 sites sensed.      Skin integrity: No ulcer, blister, skin breakdown, erythema, warmth, callus or dry skin.   Neurological:      Mental Status: He is alert.   Psychiatric:         Behavior: Behavior normal.         Thought Content: Thought content normal.       Recent Results (from the past 1008 hour(s))   CBC auto differential    Collection Time: 09/22/20  6:22 AM   Result Value Ref Range    WBC 6.53 3.90 - 12.70 K/uL    RBC 4.85 4.60 - 6.20 M/uL    Hemoglobin 14.3 14.0 - 18.0 g/dL    Hematocrit 44.6 40.0 - 54.0 %    Mean Corpuscular Volume 92 82 - 98 fL    Mean Corpuscular Hemoglobin 29.5 27.0 - 31.0 pg    Mean Corpuscular Hemoglobin Conc 32.1 32.0 - 36.0 g/dL    RDW 12.7 11.5 - 14.5 %    Platelets 172 150 - 350 K/uL    MPV 10.7 9.2 - 12.9 fL    Immature Granulocytes 0.5 0.0 - 0.5 %    Gran # (ANC) 3.9 1.8 - 7.7 K/uL    Immature Grans (Abs) 0.03 0.00 - 0.04 K/uL    Lymph # 1.7 1.0 - 4.8 K/uL    Mono # 0.5 0.3 - 1.0 K/uL    Eos # 0.3 0.0 - 0.5 K/uL    Baso # 0.07 0.00 - 0.20 K/uL    nRBC 0 0 /100 WBC    Gran% 59.2 38.0 - 73.0 %    Lymph% 26.0 18.0 - 48.0 %    Mono% 8.0 4.0 - 15.0 %    Eosinophil% 5.2 0.0 - 8.0 %    Basophil% 1.1 0.0 - 1.9 %    Differential Method Automated    Comprehensive metabolic  panel    Collection Time: 09/22/20  6:22 AM   Result Value Ref Range    Sodium 138 136 - 145 mmol/L    Potassium 4.3 3.5 - 5.1 mmol/L    Chloride 102 95 - 110 mmol/L    CO2 26 23 - 29 mmol/L    Glucose 103 70 - 110 mg/dL    BUN, Bld 11 8 - 23 mg/dL    Creatinine 1.0 0.5 - 1.4 mg/dL    Calcium 9.1 8.7 - 10.5 mg/dL    Total Protein 6.6 6.0 - 8.4 g/dL    Albumin 3.8 3.5 - 5.2 g/dL    Total Bilirubin 0.2 0.1 - 1.0 mg/dL    Alkaline Phosphatase 50 (L) 55 - 135 U/L    AST 15 10 - 40 U/L    ALT 18 10 - 44 U/L    Anion Gap 10 8 - 16 mmol/L    eGFR if African American >60 >60 mL/min/1.73 m^2    eGFR if non African American >60 >60 mL/min/1.73 m^2   Lipid panel    Collection Time: 09/22/20  6:22 AM   Result Value Ref Range    Cholesterol 115 (L) 120 - 199 mg/dL    Triglycerides 186 (H) 30 - 150 mg/dL    HDL 28 (L) 40 - 75 mg/dL    LDL Cholesterol 49.8 (L) 63.0 - 159.0 mg/dL    Hdl/Cholesterol Ratio 24.3 20.0 - 50.0 %    Total Cholesterol/HDL Ratio 4.1 2.0 - 5.0    Non-HDL Cholesterol 87 mg/dL   Hemoglobin A1c    Collection Time: 09/22/20  6:23 AM   Result Value Ref Range    Hemoglobin A1C 5.7 (H) 4.0 - 5.6 %    Estimated Avg Glucose 117 68 - 131 mg/dL   Microalbumin/creatinine urine ratio    Collection Time: 09/22/20  6:23 AM   Result Value Ref Range    Microalbum.,U,Random 75.0 ug/mL    Creatinine, Random Ur 106.0 23.0 - 375.0 mg/dL    Microalb Creat Ratio 70.8 (H) 0.0 - 30.0 ug/mg          Assessment:       1. 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 Erectile dysfunction, unspecified erectile dysfunction type    3. Hypertension associated with diabetes    4. Hyperlipidemia associated with type 2 diabetes mellitus    5. Anticoagulated    6. Need for vaccination with 13-polyvalent pneumococcal conjugate vaccine    7. 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  Dispense: 90 tablet; Refill: 3    Erectile dysfunction, unspecified erectile dysfunction type  -     sildenafil (REVATIO) 20 mg Tab; TAKE THREE TABLETS BY MOUTH ONCE DAILY AS NEEDED  Dispense: 30 tablet; Refill: 11    Hypertension associated with diabetes  -     Comprehensive metabolic panel; Future; Expected date: 09/25/2020    Hyperlipidemia associated with type 2 diabetes mellitus  -     Lipid Panel; Future; Expected date: 09/25/2020    Anticoagulated  -     CBC auto differential; Future; Expected date: 09/25/2020    Need for vaccination with 13-polyvalent pneumococcal conjugate vaccine  -     Pneumococcal Conjugate Vaccine (13 Valent) (IM)    Needs flu shot  -     Influenza - High Dose (65+) (PF) (IM)      Follow up in about 3 months (around 12/25/2020).

## 2020-09-25 NOTE — PATIENT INSTRUCTIONS
Lose 5 pounds.  Suggest [employer] diet  Avoid white carbohydrates.   Eat  a palm sized protein with each meal.       Walk for 10 minutes after you eat.  This will keep your sugars from going high at that time.    Check sugar once a week fasting and once a week before and 2 hrs after a meal.  Write down all results and what you ate during the meal.       Thank you for choosing [facility].     Please fill out the patient experience survey.

## 2020-09-25 NOTE — PROGRESS NOTES
Administered pneumovax and flu vaccine IM. Patient tolerated well. No bleeding at insertion site noted. Pain scale 0/10 with injection. 2 patient identifiers used (name, ), aseptic technique maintained.

## 2020-10-05 ENCOUNTER — PATIENT MESSAGE (OUTPATIENT)
Dept: ADMINISTRATIVE | Facility: HOSPITAL | Age: 70
End: 2020-10-05

## 2020-10-26 ENCOUNTER — TELEPHONE (OUTPATIENT)
Dept: FAMILY MEDICINE | Facility: CLINIC | Age: 70
End: 2020-10-26

## 2020-10-26 NOTE — TELEPHONE ENCOUNTER
----- Message from Aiyana Huff sent at 10/26/2020  9:58 AM CDT -----  Regarding: Pt advice  Type: Needs Medical Advice  Who Called: pt  Symptoms (please be specific):  Pain in left shoulder from fall a few weeks ago. Pt cant lift arm  How long has patient had these symptoms: 2 weeks  Pharmacy name and phone #:   [id number] - [place] - [address]  [address] [place] [address]  Phone: [phone] Fax: [phone]     Best Call Back Number: [phone]  Additional Information: pt wants to know if he has to come in or if he can have orders put in for an xray. Please call to advise.

## 2020-10-27 ENCOUNTER — HOSPITAL ENCOUNTER (OUTPATIENT)
Dept: RADIOLOGY | Facility: HOSPITAL | Age: 70
Discharge: HOME | End: 2020-10-27
Attending: ORTHOPAEDIC SURGERY
Payer: MEDICARE

## 2020-10-27 ENCOUNTER — OFFICE VISIT (OUTPATIENT)
Dept: FAMILY MEDICINE | Facility: CLINIC | Age: 70
End: 2020-10-27
Payer: MEDICARE

## 2020-10-27 ENCOUNTER — OFFICE VISIT (OUTPATIENT)
Dept: ORTHOPEDICS | Facility: CLINIC | Age: 70
End: 2020-10-27
Payer: MEDICARE

## 2020-10-27 VITALS
HEIGHT: 71 IN | OXYGEN SATURATION: 95 % | TEMPERATURE: 97 F | RESPIRATION RATE: 20 BRPM | DIASTOLIC BLOOD PRESSURE: 70 MMHG | WEIGHT: 212.5 LBS | SYSTOLIC BLOOD PRESSURE: 120 MMHG | HEART RATE: 70 BPM | BODY MASS INDEX: 29.75 KG/M2

## 2020-10-27 VITALS — WEIGHT: 212 LBS | HEIGHT: 71 IN | RESPIRATION RATE: 16 BRPM | BODY MASS INDEX: 29.68 KG/M2

## 2020-10-27 DIAGNOSIS — M25.512 ACUTE PAIN OF LEFT SHOULDER: Primary | ICD-10-CM

## 2020-10-27 DIAGNOSIS — M25.512 ACUTE PAIN OF LEFT SHOULDER: ICD-10-CM
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
## 2020-10-27 DIAGNOSIS — E11.9 CONTROLLED TYPE 2 DIABETES MELLITUS WITHOUT COMPLICATION, WITHOUT LONG-TERM CURRENT USE OF INSULIN: ICD-10-CM

## 2020-10-27 PROCEDURE — 73030 XR SHOULDER TRAUMA 3 VIEW LEFT: ICD-10-PCS | Mod: 26,LT,, | Performed by: RADIOLOGY

## 2020-10-27 PROCEDURE — 1125F AMNT PAIN NOTED PAIN PRSNT: CPT | Mod: S$GLB,,, | Performed by: ORTHOPAEDIC SURGERY

## 2020-10-27 PROCEDURE — 99213 PR OFFICE/OUTPT VISIT, EST, LEVL III, 20-29 MIN: ICD-10-PCS | Mod: S$GLB,,, | Performed by: NURSE PRACTITIONER

## 2020-10-27 PROCEDURE — 3078F DIAST BP <80 MM HG: CPT | Mod: CPTII,S$GLB,, | Performed by: ORTHOPAEDIC SURGERY

## 2020-10-27 PROCEDURE — 99204 PR OFFICE/OUTPT VISIT, NEW, LEVL IV, 45-59 MIN: ICD-10-PCS | Mod: S$GLB,,, | Performed by: ORTHOPAEDIC SURGERY

## 2020-10-27 PROCEDURE — 1101F PT FALLS ASSESS-DOCD LE1/YR: CPT | Mod: CPTII,S$GLB,, | Performed by: ORTHOPAEDIC SURGERY

## 2020-10-27 PROCEDURE — 3078F PR MOST RECENT DIASTOLIC BLOOD PRESSURE < 80 MM HG: ICD-10-PCS | Mod: CPTII,S$GLB,, | Performed by: ORTHOPAEDIC SURGERY

## 2020-10-27 PROCEDURE — 73030 X-RAY EXAM OF SHOULDER: CPT | Mod: 26,LT,, | Performed by: RADIOLOGY

## 2020-10-27 PROCEDURE — 3008F BODY MASS INDEX DOCD: CPT | Mod: CPTII,S$GLB,, | Performed by: ORTHOPAEDIC SURGERY

## 2020-10-27 PROCEDURE — 3074F SYST BP LT 130 MM HG: CPT | Mod: CPTII,S$GLB,, | Performed by: ORTHOPAEDIC SURGERY

## 2020-10-27 PROCEDURE — 99213 OFFICE O/P EST LOW 20 MIN: CPT | Mod: S$GLB,,, | Performed by: NURSE PRACTITIONER

## 2020-10-27 PROCEDURE — 3074F PR MOST RECENT SYSTOLIC BLOOD PRESSURE < 130 MM HG: ICD-10-PCS | Mod: CPTII,S$GLB,, | Performed by: ORTHOPAEDIC SURGERY

## 2020-10-27 PROCEDURE — 1159F PR MEDICATION LIST DOCUMENTED IN MEDICAL RECORD: ICD-10-PCS | Mod: S$GLB,,, | Performed by: ORTHOPAEDIC SURGERY

## 2020-10-27 PROCEDURE — 99999 PR PBB SHADOW E&M-EST. PATIENT-LVL IV: ICD-10-PCS | Mod: PBBFAC,,, | Performed by: ORTHOPAEDIC SURGERY

## 2020-10-27 PROCEDURE — 1159F MED LIST DOCD IN RCRD: CPT | Mod: S$GLB,,, | Performed by: ORTHOPAEDIC SURGERY

## 2020-10-27 PROCEDURE — 73030 X-RAY EXAM OF SHOULDER: CPT | Mod: TC,PN,LT

## 2020-10-27 PROCEDURE — 1125F PR PAIN SEVERITY QUANTIFIED, PAIN PRESENT: ICD-10-PCS | Mod: S$GLB,,, | Performed by: ORTHOPAEDIC SURGERY

## 2020-10-27 PROCEDURE — 1101F PR PT FALLS ASSESS DOC 0-1 FALLS W/OUT INJ PAST YR: ICD-10-PCS | Mod: CPTII,S$GLB,, | Performed by: ORTHOPAEDIC SURGERY

## 2020-10-27 PROCEDURE — 3008F PR BODY MASS INDEX (BMI) DOCUMENTED: ICD-10-PCS | Mod: CPTII,S$GLB,, | Performed by: ORTHOPAEDIC SURGERY

## 2020-10-27 PROCEDURE — 99999 PR PBB SHADOW E&M-EST. PATIENT-LVL IV: CPT | Mod: PBBFAC,,, | Performed by: ORTHOPAEDIC SURGERY

## 2020-10-27 PROCEDURE — 99204 OFFICE O/P NEW MOD 45 MIN: CPT | Mod: S$GLB,,, | Performed by: ORTHOPAEDIC SURGERY

## 2020-10-27 RX ORDER — METHYLPREDNISOLONE 4 MG/1
TABLET ORAL
Qty: 1 PACKAGE | Refills: 0 | Status: SHIPPED | OUTPATIENT
Start: 2020-10-27 | End: 2020-11-17

## 2020-10-27 NOTE — PROGRESS NOTES
Subjective:      2:38 PM     Patient ID: Umang Goldberg is a 70 y.o. male.    Chief Complaint: Fall (two weeks and 3 days ago,right ribs pain) and Shoulder Pain (left)    Fall  The accident occurred more than 1 week ago (slipped at home on water that leaked from refrigerator. ). The fall occurred while walking. The point of impact was the head and right shoulder. The pain is present in the left shoulder. The pain is severe. Exacerbated by: any movement of the left arm/shoulder. Pertinent negatives include no fever, headaches, hematuria, loss of consciousness, nausea or vomiting. He has tried NSAID for the symptoms. The treatment provided moderate relief.   Shoulder Pain   The pain is present in the left shoulder. This is a new problem. The current episode started 1 to 4 weeks ago. The problem occurs constantly. The problem has been rapidly worsening. The quality of the pain is described as sharp. The pain is severe. Associated symptoms include a limited range of motion and stiffness. Pertinent negatives include no fever or headaches. The symptoms are aggravated by activity. He has tried NSAIDS for the symptoms. The treatment provided no relief. His past medical history is significant for diabetes.   Has well controlled DM2. Takes metformin without side effects.   Review of Systems   Constitutional: Negative for fatigue, fever and unexpected weight change.   HENT: Negative for congestion, hearing loss and sore throat.    Eyes: Negative for pain, redness and visual disturbance.   Respiratory: Positive for cough and wheezing. Negative for shortness of breath.    Cardiovascular: Negative for chest pain and leg swelling.   Gastrointestinal: Negative for constipation, diarrhea, nausea and vomiting.   Endocrine: Negative for cold intolerance and heat intolerance.   Genitourinary: Negative for dysuria and hematuria.   Musculoskeletal: Positive for stiffness. Negative for arthralgias and myalgias.   Skin: Negative for pallor  "and rash.   Neurological: Negative for dizziness, loss of consciousness and headaches.   Psychiatric/Behavioral: Negative for dysphoric mood. The patient is not nervous/anxious.          Objective:      Vitals:    10/27/20 1356   BP: 120/70   Pulse: 70   Resp: 20   Temp: 97 °F (36.1 °C)   TempSrc: Temporal   SpO2: 95%   Weight: 96.4 kg (212 lb 8.4 oz)   Height: 5' 11" (1.803 m)   PainSc:   4   PainLoc: Shoulder     Recent Results (from the past 1008 hour(s))   CBC auto differential    Collection Time: 09/22/20  6:22 AM   Result Value Ref Range    WBC 6.53 3.90 - 12.70 K/uL    RBC 4.85 4.60 - 6.20 M/uL    Hemoglobin 14.3 14.0 - 18.0 g/dL    Hematocrit 44.6 40.0 - 54.0 %    MCV 92 82 - 98 fL    MCH 29.5 27.0 - 31.0 pg    MCHC 32.1 32.0 - 36.0 g/dL    RDW 12.7 11.5 - 14.5 %    Platelets 172 150 - 350 K/uL    MPV 10.7 9.2 - 12.9 fL    Immature Granulocytes 0.5 0.0 - 0.5 %    Gran # (ANC) 3.9 1.8 - 7.7 K/uL    Immature Grans (Abs) 0.03 0.00 - 0.04 K/uL    Lymph # 1.7 1.0 - 4.8 K/uL    Mono # 0.5 0.3 - 1.0 K/uL    Eos # 0.3 0.0 - 0.5 K/uL    Baso # 0.07 0.00 - 0.20 K/uL    nRBC 0 0 /100 WBC    Gran % 59.2 38.0 - 73.0 %    Lymph % 26.0 18.0 - 48.0 %    Mono % 8.0 4.0 - 15.0 %    Eosinophil % 5.2 0.0 - 8.0 %    Basophil % 1.1 0.0 - 1.9 %    Differential Method Automated    Comprehensive metabolic panel    Collection Time: 09/22/20  6:22 AM   Result Value Ref Range    Sodium 138 136 - 145 mmol/L    Potassium 4.3 3.5 - 5.1 mmol/L    Chloride 102 95 - 110 mmol/L    CO2 26 23 - 29 mmol/L    Glucose 103 70 - 110 mg/dL    BUN 11 8 - 23 mg/dL    Creatinine 1.0 0.5 - 1.4 mg/dL    Calcium 9.1 8.7 - 10.5 mg/dL    Total Protein 6.6 6.0 - 8.4 g/dL    Albumin 3.8 3.5 - 5.2 g/dL    Total Bilirubin 0.2 0.1 - 1.0 mg/dL    Alkaline Phosphatase 50 (L) 55 - 135 U/L    AST 15 10 - 40 U/L    ALT 18 10 - 44 U/L    Anion Gap 10 8 - 16 mmol/L    eGFR if African American >60 >60 mL/min/1.73 m^2    eGFR if non African American >60 >60 mL/min/1.73 " m^2   Lipid panel    Collection Time: 09/22/20  6:22 AM   Result Value Ref Range    Cholesterol 115 (L) 120 - 199 mg/dL    Triglycerides 186 (H) 30 - 150 mg/dL    HDL 28 (L) 40 - 75 mg/dL    LDL Cholesterol 49.8 (L) 63.0 - 159.0 mg/dL    HDL/Cholesterol Ratio 24.3 20.0 - 50.0 %    Total Cholesterol/HDL Ratio 4.1 2.0 - 5.0    Non-HDL Cholesterol 87 mg/dL   Hemoglobin A1c    Collection Time: 09/22/20  6:23 AM   Result Value Ref Range    Hemoglobin A1C 5.7 (H) 4.0 - 5.6 %    Estimated Avg Glucose 117 68 - 131 mg/dL   Microalbumin/creatinine urine ratio    Collection Time: 09/22/20  6:23 AM   Result Value Ref Range    Microalbumin, Urine 75.0 ug/mL    Creatinine, Urine 106.0 23.0 - 375.0 mg/dL    Microalb/Creat Ratio 70.8 (H) 0.0 - 30.0 ug/mg     Physical Exam  Vitals signs and nursing note reviewed.   Constitutional:       General: He is not in acute distress.     Appearance: Normal appearance. He is well-developed. He is not diaphoretic.   HENT:      Head: Normocephalic and atraumatic.   Eyes:      General: No scleral icterus.        Right eye: No discharge.         Left eye: No discharge.      Conjunctiva/sclera: Conjunctivae normal.   Cardiovascular:      Rate and Rhythm: Normal rate and regular rhythm.      Heart sounds: Normal heart sounds. No murmur. No friction rub. No gallop.    Pulmonary:      Effort: Pulmonary effort is normal. No respiratory distress.      Breath sounds: Normal breath sounds. No stridor. No wheezing, rhonchi or rales.   Musculoskeletal:         General: Tenderness present. No swelling.      Comments: Unable to abduct left arm more than 45 degrees.    Skin:     General: Skin is warm and dry.      Capillary Refill: Capillary refill takes less than 2 seconds.      Coloration: Skin is not jaundiced or pale.      Findings: No rash.   Neurological:      Mental Status: He is alert and oriented to person, place, and time.   Psychiatric:         Mood and Affect: Mood normal.         Behavior:  Behavior normal.           Assessment:     This office visit cannot be billed incident to as it does not meet the needed criteria.     1. Acute pain of left shoulder    2. Controlled type 2 diabetes mellitus without complication, without long-term current use of insulin          Plan:       Acute pain of left shoulder  -     Ambulatory referral/consult to Orthopedics; Future; Expected date: 11/03/2020    Controlled type 2 diabetes mellitus without complication, without long-term current use of insulin  Comments:  Stable. Will continue to monitor.       No follow-ups on file.

## 2020-10-27 NOTE — LETTER
October 27, 2020      Gayle Salas, APRN  [address] [address]           [facility] [place] [address] [place] [id number]  Phone: [phone]          Patient: Umang Goldberg   MR Number: [id number]   YOB: 1950   Date of Visit: 10/27/2020       Dear Gayle Salas:    Thank you for referring Umang Goldberg to me for evaluation. Attached you will find relevant portions of my assessment and plan of care.    If you have questions, please do not hesitate to call me. I look forward to following Umang Goldberg along with you.    Sincerely,    Gene Mckenzie II, MD    Enclosure  CC:  No Recipients    If you would like to receive this communication electronically, please contact [email or web address] or [phone] to request more information on [employer] access.    For providers and/or their staff who would like to refer a patient to [facility], please contact us through our one-stop-shop provider referral line, [facility], at [phone].    If you feel you have received this communication in error or would no longer like to receive these types of communications, please e-mail [email or web address]

## 2020-10-27 NOTE — PROGRESS NOTES
CC:  70-year-old male presents for evaluation of left shoulder pain.  The patient states that he had a slip and fall about a 2 and half weeks ago when he slipped on some water that had leaked from his refrigerator.  He reached out for the counter and grab the counter with his left hand tried himself from falling and that jerked the left shoulder.  The patient still fell on the ground and hyper extended the shoulder.  He had severe pain initially that slowly gotten somewhat better.  The patient reports a baseline 2/10 pain unless he tries to use the shoulder.  If he forgets and tries to open the refrigerator are car door or use the shoulder for any activity he has a severe 10/10 pain.  He has tried NSAIDs over-the-counter for his pain without relief and stop the NSAIDs because he has a pretty significant cardiac history and is on blood thinners for atrial fibrillation..  He reports the pain is severe and it interferes with his ability to sleep.  Made worse by activity.  He is having difficulty reaching overhead.    ROS:    Constitution: Denies chills, fever, and sweats.  HENT: Denies headaches or blurry vision.  Cardiovascular: Denies chest pain, positive for irregular heart beat, patient has atrial fibrillation.  Respiratory: Denies cough or shortness of breath.  Gastrointestinal: Denies abdominal pain, nausea, or vomiting.  Genitourinary:  Denies urinary incontinence, bladder and kidney issues  Musculoskeletal:  Denies muscle cramps.  Positive for left shoulder pain and decreased range of motion  Neurological: Denies dizziness or focal weakness.  Psychiatric/Behavioral: Normal mental status.  Hematologic/Lymphatic: Denies bleeding problem or easy bruising/bleeding.  Skin: Denies rash or suspicious lesions.    Physical examination     Gen - No acute distress, well nourished, well groomed   Eyes - Extraoccular motions intact, pupils equally round and reactive to light and accommodation   ENT - normocephalic,  atruamtic, oropharynx clear   Neck - Supple, no abnormal masses   Cardiovascular - regular rate and rhythm   Pulmonary - clear to auscultation bilaterally, no wheezes, ronchi, or rales   Abdomen - soft, non-tender, non-distended, positive bowel sounds   Psych - The patient is alert and oriented x3 with normal mood and affect    Left Upper Extremity Examination     Skin is intact throughout   Motor is intact distally radial, median, ulnar, AIN, PIN   +2 radial and ulnar pulses   Sensation to light touch is intact distally radial, median, and ulnar     Examination of the Left shoulder:   ROM:    Active  For - 30   Abd - 60   Ext - 20   Int - front pocket    Passive   For - 60 with pain  Abd - 80 with pain  Ext - 40 with pain  Int - hip with pain    With passive forward flexion or abduction the patient cannot hold the shoulder out and drops painfully to his side as soon as I move my hands away.    Tenderness to palpation:   Subacromial space - positive  Biceps Tendon - positive  Anterior Glenohumeral Joint - positive  AC joint - positive  Glenohumeral instability - negative  Provocative testing deferred secondary to pain    X-ray images were examined and personally interpreted by me.  Three views left shoulder dated 10/27/2020 show the humeral head is well reduced in the glenoid.  There is some arthritic changes noted especially around the glenoid with a periarticular osteophyte seen.  The joint space is maintained.  There is narrowing of the subacromial space.  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  No obvious acute fractures.    Dx:  Likely rotator cuff tear of left shoulder    Plan:  We discussed treatment options.  The patient adamantly refused an injection.  He cannot take NSAIDs secondary to his cardiac history.  I send him in a prescription for Medrol Dosepak.  We dispensed him a sling.  Going to have him start physical therapy.  He can follow up in 2-3 weeks if he still having pain and not  improving at that time we could discuss MRI and possible surgery.

## 2020-12-29 ENCOUNTER — LAB VISIT (OUTPATIENT)
Dept: LAB | Facility: HOSPITAL | Age: 70
End: 2020-12-29
Attending: INTERNAL MEDICINE
Payer: MEDICARE

## 2020-12-29 DIAGNOSIS — Z79.01 ANTICOAGULATED: ICD-10-CM

## 2020-12-29 DIAGNOSIS — E11.59 HYPERTENSION ASSOCIATED WITH DIABETES: ICD-10-CM

## 2020-12-29 DIAGNOSIS — E78.5 HYPERLIPIDEMIA ASSOCIATED WITH TYPE 2 DIABETES MELLITUS: ICD-10-CM

## 2020-12-29 DIAGNOSIS — I15.2 HYPERTENSION ASSOCIATED WITH DIABETES: ICD-10-CM

## 2020-12-29 DIAGNOSIS — E11.69 HYPERLIPIDEMIA ASSOCIATED WITH TYPE 2 DIABETES MELLITUS: ICD-10-CM

## 2020-12-29 DIAGNOSIS — E11.21 WELL CONTROLLED TYPE 2 DIABETES MELLITUS WITH NEPHROPATHY: ICD-10-CM

## 2020-12-29 LAB
ALBUMIN SERPL BCP-MCNC: 3.9 G/DL (ref 3.5–5.2)
ALBUMIN/CREAT UR: 41.3 UG/MG (ref 0–30)
ALP SERPL-CCNC: 59 U/L (ref 55–135)
ALT SERPL W/O P-5'-P-CCNC: 15 U/L (ref 10–44)
ANION GAP SERPL CALC-SCNC: 8 MMOL/L (ref 8–16)
AST SERPL-CCNC: 13 U/L (ref 10–40)
BASOPHILS # BLD AUTO: 0.05 K/UL (ref 0–0.2)
BASOPHILS NFR BLD: 0.6 % (ref 0–1.9)
BILIRUB SERPL-MCNC: 0.5 MG/DL (ref 0.1–1)
BUN SERPL-MCNC: 11 MG/DL (ref 8–23)
CALCIUM SERPL-MCNC: 9.5 MG/DL (ref 8.7–10.5)
CHLORIDE SERPL-SCNC: 97 MMOL/L (ref 95–110)
CHOLEST SERPL-MCNC: 140 MG/DL (ref 120–199)
CHOLEST/HDLC SERPL: 5 {RATIO} (ref 2–5)
CO2 SERPL-SCNC: 29 MMOL/L (ref 23–29)
CREAT SERPL-MCNC: 1 MG/DL (ref 0.5–1.4)
CREAT UR-MCNC: 121 MG/DL (ref 23–375)
DIFFERENTIAL METHOD: ABNORMAL
EOSINOPHIL # BLD AUTO: 0.4 K/UL (ref 0–0.5)
EOSINOPHIL NFR BLD: 4.9 % (ref 0–8)
ERYTHROCYTE [DISTWIDTH] IN BLOOD BY AUTOMATED COUNT: 13 % (ref 11.5–14.5)
EST. GFR  (AFRICAN AMERICAN): >60 ML/MIN/1.73 M^2
EST. GFR  (NON AFRICAN AMERICAN): >60 ML/MIN/1.73 M^2
ESTIMATED AVG GLUCOSE: 120 MG/DL (ref 68–131)
GLUCOSE SERPL-MCNC: 112 MG/DL (ref 70–110)
HBA1C MFR BLD HPLC: 5.8 % (ref 4–5.6)
HCT VFR BLD AUTO: 45.8 % (ref 40–54)
HDLC SERPL-MCNC: 28 MG/DL (ref 40–75)
HDLC SERPL: 20 % (ref 20–50)
HGB BLD-MCNC: 14.3 G/DL (ref 14–18)
IMM GRANULOCYTES # BLD AUTO: 0.04 K/UL (ref 0–0.04)
IMM GRANULOCYTES NFR BLD AUTO: 0.5 % (ref 0–0.5)
LDLC SERPL CALC-MCNC: 50.8 MG/DL (ref 63–159)
LYMPHOCYTES # BLD AUTO: 1.9 K/UL (ref 1–4.8)
LYMPHOCYTES NFR BLD: 24.1 % (ref 18–48)
MCH RBC QN AUTO: 28.3 PG (ref 27–31)
MCHC RBC AUTO-ENTMCNC: 31.2 G/DL (ref 32–36)
MCV RBC AUTO: 91 FL (ref 82–98)
MICROALBUMIN UR DL<=1MG/L-MCNC: 50 UG/ML
MONOCYTES # BLD AUTO: 0.7 K/UL (ref 0.3–1)
MONOCYTES NFR BLD: 8.3 % (ref 4–15)
NEUTROPHILS # BLD AUTO: 4.8 K/UL (ref 1.8–7.7)
NEUTROPHILS NFR BLD: 61.6 % (ref 38–73)
NONHDLC SERPL-MCNC: 112 MG/DL
NRBC BLD-RTO: 0 /100 WBC
PLATELET # BLD AUTO: 167 K/UL (ref 150–350)
PMV BLD AUTO: 9.9 FL (ref 9.2–12.9)
POTASSIUM SERPL-SCNC: 4.4 MMOL/L (ref 3.5–5.1)
PROT SERPL-MCNC: 7 G/DL (ref 6–8.4)
RBC # BLD AUTO: 5.05 M/UL (ref 4.6–6.2)
SODIUM SERPL-SCNC: 134 MMOL/L (ref 136–145)
TRIGL SERPL-MCNC: 306 MG/DL (ref 30–150)
WBC # BLD AUTO: 7.81 K/UL (ref 3.9–12.7)

## 2020-12-29 PROCEDURE — 85025 COMPLETE CBC W/AUTO DIFF WBC: CPT

## 2020-12-29 PROCEDURE — 80061 LIPID PANEL: CPT

## 2020-12-29 PROCEDURE — 82570 ASSAY OF URINE CREATININE: CPT

## 2020-12-29 PROCEDURE — 83036 HEMOGLOBIN GLYCOSYLATED A1C: CPT

## 2020-12-29 PROCEDURE — 82043 UR ALBUMIN QUANTITATIVE: CPT

## 2020-12-29 PROCEDURE — 36415 COLL VENOUS BLD VENIPUNCTURE: CPT

## 2020-12-29 PROCEDURE — 80053 COMPREHEN METABOLIC PANEL: CPT

## 2021-01-04 ENCOUNTER — PATIENT MESSAGE (OUTPATIENT)
Dept: ADMINISTRATIVE | Facility: HOSPITAL | Age: 71
End: 2021-01-04

## 2021-01-06 ENCOUNTER — OFFICE VISIT (OUTPATIENT)
Dept: FAMILY MEDICINE | Facility: CLINIC | Age: 71
End: 2021-01-06
Payer: MEDICARE

## 2021-01-06 VITALS
OXYGEN SATURATION: 96 % | DIASTOLIC BLOOD PRESSURE: 70 MMHG | BODY MASS INDEX: 29.75 KG/M2 | TEMPERATURE: 98 F | WEIGHT: 212.5 LBS | HEART RATE: 62 BPM | RESPIRATION RATE: 16 BRPM | HEIGHT: 71 IN | SYSTOLIC BLOOD PRESSURE: 126 MMHG

## 2021-01-06 DIAGNOSIS — Z72.0 TOBACCO ABUSE: ICD-10-CM

## 2021-01-06 DIAGNOSIS — J44.9 CHRONIC OBSTRUCTIVE PULMONARY DISEASE, UNSPECIFIED COPD TYPE: ICD-10-CM

## 2021-01-06 DIAGNOSIS — E11.69 HYPERLIPIDEMIA ASSOCIATED WITH TYPE 2 DIABETES MELLITUS: ICD-10-CM

## 2021-01-06 DIAGNOSIS — I48.0 PAROXYSMAL ATRIAL FIBRILLATION: ICD-10-CM

## 2021-01-06 DIAGNOSIS — I15.2 HYPERTENSION ASSOCIATED WITH DIABETES: ICD-10-CM

## 2021-01-06 DIAGNOSIS — E11.59 HYPERTENSION ASSOCIATED WITH DIABETES: ICD-10-CM

## 2021-01-06 DIAGNOSIS — Z79.01 ANTICOAGULATED: ICD-10-CM

## 2021-01-06 DIAGNOSIS — E11.59 TYPE 2 DIABETES MELLITUS WITH OTHER CIRCULATORY COMPLICATIONS: Primary | ICD-10-CM

## 2021-01-06 DIAGNOSIS — E78.5 HYPERLIPIDEMIA ASSOCIATED WITH TYPE 2 DIABETES MELLITUS: ICD-10-CM

## 2021-01-06 DIAGNOSIS — E11.21 DIABETIC NEPHROPATHY ASSOCIATED WITH TYPE 2 DIABETES MELLITUS: ICD-10-CM

## 2021-01-06 PROCEDURE — 3074F SYST BP LT 130 MM HG: CPT | Mod: CPTII,S$GLB,, | Performed by: INTERNAL MEDICINE

## 2021-01-06 PROCEDURE — 3008F BODY MASS INDEX DOCD: CPT | Mod: CPTII,S$GLB,, | Performed by: INTERNAL MEDICINE

## 2021-01-06 PROCEDURE — 1159F PR MEDICATION LIST DOCUMENTED IN MEDICAL RECORD: ICD-10-PCS | Mod: S$GLB,,, | Performed by: INTERNAL MEDICINE

## 2021-01-06 PROCEDURE — 3074F PR MOST RECENT SYSTOLIC BLOOD PRESSURE < 130 MM HG: ICD-10-PCS | Mod: CPTII,S$GLB,, | Performed by: INTERNAL MEDICINE

## 2021-01-06 PROCEDURE — 1126F PR PAIN SEVERITY QUANTIFIED, NO PAIN PRESENT: ICD-10-PCS | Mod: S$GLB,,, | Performed by: INTERNAL MEDICINE

## 2021-01-06 PROCEDURE — 3288F FALL RISK ASSESSMENT DOCD: CPT | Mod: CPTII,S$GLB,, | Performed by: INTERNAL MEDICINE

## 2021-01-06 PROCEDURE — 3078F PR MOST RECENT DIASTOLIC BLOOD PRESSURE < 80 MM HG: ICD-10-PCS | Mod: CPTII,S$GLB,, | Performed by: INTERNAL MEDICINE

## 2021-01-06 PROCEDURE — 3044F PR MOST RECENT HEMOGLOBIN A1C LEVEL <7.0%: ICD-10-PCS | Mod: CPTII,S$GLB,, | Performed by: INTERNAL MEDICINE

## 2021-01-06 PROCEDURE — 1100F PR PT FALLS ASSESS DOC 2+ FALLS/FALL W/INJURY/YR: ICD-10-PCS | Mod: CPTII,S$GLB,, | Performed by: INTERNAL MEDICINE

## 2021-01-06 PROCEDURE — 1100F PTFALLS ASSESS-DOCD GE2>/YR: CPT | Mod: CPTII,S$GLB,, | Performed by: INTERNAL MEDICINE

## 2021-01-06 PROCEDURE — 3288F PR FALLS RISK ASSESSMENT DOCUMENTED: ICD-10-PCS | Mod: CPTII,S$GLB,, | Performed by: INTERNAL MEDICINE

## 2021-01-06 PROCEDURE — 3008F PR BODY MASS INDEX (BMI) DOCUMENTED: ICD-10-PCS | Mod: CPTII,S$GLB,, | Performed by: INTERNAL MEDICINE

## 2021-01-06 PROCEDURE — 99214 OFFICE O/P EST MOD 30 MIN: CPT | Mod: S$GLB,,, | Performed by: INTERNAL MEDICINE

## 2021-01-06 PROCEDURE — 3044F HG A1C LEVEL LT 7.0%: CPT | Mod: CPTII,S$GLB,, | Performed by: INTERNAL MEDICINE

## 2021-01-06 PROCEDURE — 99499 RISK ADDL DX/OHS AUDIT: ICD-10-PCS | Mod: S$GLB,,, | Performed by: INTERNAL MEDICINE

## 2021-01-06 PROCEDURE — 99499 UNLISTED E&M SERVICE: CPT | Mod: S$GLB,,, | Performed by: INTERNAL MEDICINE

## 2021-01-06 PROCEDURE — 1159F MED LIST DOCD IN RCRD: CPT | Mod: S$GLB,,, | Performed by: INTERNAL MEDICINE

## 2021-01-06 PROCEDURE — 3078F DIAST BP <80 MM HG: CPT | Mod: CPTII,S$GLB,, | Performed by: INTERNAL MEDICINE

## 2021-01-06 PROCEDURE — 1126F AMNT PAIN NOTED NONE PRSNT: CPT | Mod: S$GLB,,, | Performed by: INTERNAL MEDICINE

## 2021-01-06 PROCEDURE — 99214 PR OFFICE/OUTPT VISIT, EST, LEVL IV, 30-39 MIN: ICD-10-PCS | Mod: S$GLB,,, | Performed by: INTERNAL MEDICINE

## 2021-01-28 DIAGNOSIS — N52.9 ERECTILE DYSFUNCTION, UNSPECIFIED ERECTILE DYSFUNCTION TYPE: ICD-10-CM

## 2021-01-29 RX ORDER — SILDENAFIL CITRATE 20 MG/1
TABLET ORAL
Qty: 30 TABLET | Refills: 11 | Status: SHIPPED | OUTPATIENT
Start: 2021-01-29 | End: 2022-02-07 | Stop reason: SDUPTHER

## 2021-04-05 ENCOUNTER — PATIENT MESSAGE (OUTPATIENT)
Dept: ADMINISTRATIVE | Facility: HOSPITAL | Age: 71
End: 2021-04-05

## 2021-05-31 ENCOUNTER — OFFICE VISIT (OUTPATIENT)
Dept: FAMILY MEDICINE | Facility: CLINIC | Age: 71
End: 2021-05-31
Payer: MEDICARE

## 2021-05-31 VITALS
BODY MASS INDEX: 29.5 KG/M2 | HEIGHT: 71 IN | WEIGHT: 210.75 LBS | OXYGEN SATURATION: 91 % | RESPIRATION RATE: 20 BRPM | DIASTOLIC BLOOD PRESSURE: 64 MMHG | SYSTOLIC BLOOD PRESSURE: 116 MMHG | HEART RATE: 78 BPM | TEMPERATURE: 99 F

## 2021-05-31 DIAGNOSIS — E11.59 TYPE 2 DIABETES MELLITUS WITH OTHER CIRCULATORY COMPLICATIONS: ICD-10-CM

## 2021-05-31 DIAGNOSIS — J44.1 COPD EXACERBATION: Primary | ICD-10-CM

## 2021-05-31 LAB — GLUCOSE SERPL-MCNC: 113 MG/DL (ref 70–110)

## 2021-05-31 PROCEDURE — 1101F PT FALLS ASSESS-DOCD LE1/YR: CPT | Mod: CPTII,S$GLB,, | Performed by: INTERNAL MEDICINE

## 2021-05-31 PROCEDURE — 1159F MED LIST DOCD IN RCRD: CPT | Mod: S$GLB,,, | Performed by: INTERNAL MEDICINE

## 2021-05-31 PROCEDURE — 99214 OFFICE O/P EST MOD 30 MIN: CPT | Mod: S$GLB,,, | Performed by: INTERNAL MEDICINE

## 2021-05-31 PROCEDURE — 1159F PR MEDICATION LIST DOCUMENTED IN MEDICAL RECORD: ICD-10-PCS | Mod: S$GLB,,, | Performed by: INTERNAL MEDICINE

## 2021-05-31 PROCEDURE — 82962 GLUCOSE BLOOD TEST: CPT | Mod: ,,, | Performed by: INTERNAL MEDICINE

## 2021-05-31 PROCEDURE — 1126F AMNT PAIN NOTED NONE PRSNT: CPT | Mod: S$GLB,,, | Performed by: INTERNAL MEDICINE

## 2021-05-31 PROCEDURE — 99499 UNLISTED E&M SERVICE: CPT | Mod: S$GLB,,, | Performed by: INTERNAL MEDICINE

## 2021-05-31 PROCEDURE — 82962 POCT GLUCOSE, HAND-HELD DEVICE: ICD-10-PCS | Mod: ,,, | Performed by: INTERNAL MEDICINE

## 2021-05-31 PROCEDURE — 3008F PR BODY MASS INDEX (BMI) DOCUMENTED: ICD-10-PCS | Mod: CPTII,S$GLB,, | Performed by: INTERNAL MEDICINE

## 2021-05-31 PROCEDURE — 99499 RISK ADDL DX/OHS AUDIT: ICD-10-PCS | Mod: S$GLB,,, | Performed by: INTERNAL MEDICINE

## 2021-05-31 PROCEDURE — 3288F PR FALLS RISK ASSESSMENT DOCUMENTED: ICD-10-PCS | Mod: CPTII,S$GLB,, | Performed by: INTERNAL MEDICINE

## 2021-05-31 PROCEDURE — 1101F PR PT FALLS ASSESS DOC 0-1 FALLS W/OUT INJ PAST YR: ICD-10-PCS | Mod: CPTII,S$GLB,, | Performed by: INTERNAL MEDICINE

## 2021-05-31 PROCEDURE — 1126F PR PAIN SEVERITY QUANTIFIED, NO PAIN PRESENT: ICD-10-PCS | Mod: S$GLB,,, | Performed by: INTERNAL MEDICINE

## 2021-05-31 PROCEDURE — 3008F BODY MASS INDEX DOCD: CPT | Mod: CPTII,S$GLB,, | Performed by: INTERNAL MEDICINE

## 2021-05-31 PROCEDURE — 3288F FALL RISK ASSESSMENT DOCD: CPT | Mod: CPTII,S$GLB,, | Performed by: INTERNAL MEDICINE

## 2021-05-31 PROCEDURE — 99214 PR OFFICE/OUTPT VISIT, EST, LEVL IV, 30-39 MIN: ICD-10-PCS | Mod: S$GLB,,, | Performed by: INTERNAL MEDICINE

## 2021-05-31 RX ORDER — DOXYCYCLINE 100 MG/1
100 CAPSULE ORAL EVERY 12 HOURS
Qty: 20 CAPSULE | Refills: 0 | Status: SHIPPED | OUTPATIENT
Start: 2021-05-31 | End: 2022-11-04 | Stop reason: ALTCHOICE

## 2021-05-31 RX ORDER — BENZONATATE 200 MG/1
200 CAPSULE ORAL 3 TIMES DAILY PRN
Qty: 30 CAPSULE | Refills: 1 | Status: SHIPPED | OUTPATIENT
Start: 2021-05-31 | End: 2021-06-10

## 2021-05-31 RX ORDER — ALBUTEROL SULFATE 90 UG/1
2 AEROSOL, METERED RESPIRATORY (INHALATION) EVERY 6 HOURS PRN
Qty: 18 G | Refills: 1 | Status: SHIPPED | OUTPATIENT
Start: 2021-05-31 | End: 2022-11-04 | Stop reason: ALTCHOICE

## 2021-05-31 RX ORDER — PREDNISONE 20 MG/1
40 TABLET ORAL DAILY
Qty: 10 TABLET | Refills: 0 | Status: SHIPPED | OUTPATIENT
Start: 2021-05-31 | End: 2021-06-05

## 2021-06-01 ENCOUNTER — APPOINTMENT (OUTPATIENT)
Dept: URGENT CARE | Facility: CLINIC | Age: 71
End: 2021-06-01
Payer: MEDICARE

## 2021-06-01 ENCOUNTER — HOSPITAL ENCOUNTER (OUTPATIENT)
Dept: RADIOLOGY | Facility: HOSPITAL | Age: 71
Discharge: HOME | End: 2021-06-01
Attending: INTERNAL MEDICINE
Payer: MEDICARE

## 2021-06-01 DIAGNOSIS — R68.83 CHILLS: ICD-10-CM

## 2021-06-01 DIAGNOSIS — J44.1 COPD EXACERBATION: ICD-10-CM

## 2021-06-01 DIAGNOSIS — R06.02 SHORTNESS OF BREATH: ICD-10-CM

## 2021-06-01 DIAGNOSIS — R05.9 COUGH: ICD-10-CM

## 2021-06-01 DIAGNOSIS — R51.9 HEAD ACHE: ICD-10-CM

## 2021-06-01 DIAGNOSIS — R50.9 FEVER: ICD-10-CM

## 2021-06-01 DIAGNOSIS — R19.7 DIARRHEA: ICD-10-CM

## 2021-06-01 PROCEDURE — 71046 X-RAY EXAM CHEST 2 VIEWS: CPT | Mod: 26,,, | Performed by: RADIOLOGY

## 2021-06-01 PROCEDURE — U0005 INFEC AGEN DETEC AMPLI PROBE: HCPCS | Performed by: INTERNAL MEDICINE

## 2021-06-01 PROCEDURE — 71046 XR CHEST PA AND LATERAL: ICD-10-PCS | Mod: 26,,, | Performed by: RADIOLOGY

## 2021-06-01 PROCEDURE — 71046 X-RAY EXAM CHEST 2 VIEWS: CPT | Mod: TC,FY

## 2021-06-01 PROCEDURE — U0003 INFECTIOUS AGENT DETECTION BY NUCLEIC ACID (DNA OR RNA); SEVERE ACUTE RESPIRATORY SYNDROME CORONAVIRUS 2 (SARS-COV-2) (CORONAVIRUS DISEASE [COVID-19]), AMPLIFIED PROBE TECHNIQUE, MAKING USE OF HIGH THROUGHPUT TECHNOLOGIES AS DESCRIBED BY CMS-2020-01-R: HCPCS | Performed by: INTERNAL MEDICINE

## 2021-06-02 DIAGNOSIS — J44.1 COPD EXACERBATION: Primary | ICD-10-CM

## 2021-06-02 LAB — SARS-COV-2 RNA RESP QL NAA+PROBE: NOT DETECTED

## 2021-06-02 RX ORDER — PREDNISONE 20 MG/1
40 TABLET ORAL DAILY
Qty: 4 TABLET | Refills: 0 | Status: SHIPPED | OUTPATIENT
Start: 2021-06-02 | End: 2021-06-04

## 2021-06-10 ENCOUNTER — PATIENT OUTREACH (OUTPATIENT)
Dept: ADMINISTRATIVE | Facility: HOSPITAL | Age: 71
End: 2021-06-10

## 2021-06-10 DIAGNOSIS — E11.9 DIABETES MELLITUS WITHOUT COMPLICATION: Primary | ICD-10-CM

## 2021-06-11 ENCOUNTER — TELEPHONE (OUTPATIENT)
Dept: FAMILY MEDICINE | Facility: CLINIC | Age: 71
End: 2021-06-11

## 2021-07-01 ENCOUNTER — PATIENT MESSAGE (OUTPATIENT)
Dept: ADMINISTRATIVE | Facility: OTHER | Age: 71
End: 2021-07-01

## 2021-07-07 ENCOUNTER — PATIENT MESSAGE (OUTPATIENT)
Dept: ADMINISTRATIVE | Facility: HOSPITAL | Age: 71
End: 2021-07-07

## 2021-07-09 ENCOUNTER — LAB VISIT (OUTPATIENT)
Dept: LAB | Facility: HOSPITAL | Age: 71
End: 2021-07-09
Attending: INTERNAL MEDICINE
Payer: MEDICARE

## 2021-07-09 DIAGNOSIS — E11.59 TYPE 2 DIABETES MELLITUS WITH OTHER CIRCULATORY COMPLICATIONS: ICD-10-CM

## 2021-07-09 LAB
ALBUMIN SERPL BCP-MCNC: 3.6 G/DL (ref 3.5–5.2)
ALP SERPL-CCNC: 61 U/L (ref 55–135)
ALT SERPL W/O P-5'-P-CCNC: 13 U/L (ref 10–44)
ANION GAP SERPL CALC-SCNC: 9 MMOL/L (ref 8–16)
AST SERPL-CCNC: 12 U/L (ref 10–40)
BASOPHILS # BLD AUTO: 0.05 K/UL (ref 0–0.2)
BASOPHILS NFR BLD: 0.8 % (ref 0–1.9)
BILIRUB SERPL-MCNC: 0.4 MG/DL (ref 0.1–1)
BUN SERPL-MCNC: 12 MG/DL (ref 8–23)
CALCIUM SERPL-MCNC: 9.3 MG/DL (ref 8.7–10.5)
CHLORIDE SERPL-SCNC: 97 MMOL/L (ref 95–110)
CHOLEST SERPL-MCNC: 128 MG/DL (ref 120–199)
CHOLEST/HDLC SERPL: 4.7 {RATIO} (ref 2–5)
CO2 SERPL-SCNC: 26 MMOL/L (ref 23–29)
CREAT SERPL-MCNC: 1 MG/DL (ref 0.5–1.4)
DIFFERENTIAL METHOD: ABNORMAL
EOSINOPHIL # BLD AUTO: 0.2 K/UL (ref 0–0.5)
EOSINOPHIL NFR BLD: 4.1 % (ref 0–8)
ERYTHROCYTE [DISTWIDTH] IN BLOOD BY AUTOMATED COUNT: 14.1 % (ref 11.5–14.5)
EST. GFR  (AFRICAN AMERICAN): >60 ML/MIN/1.73 M^2
EST. GFR  (NON AFRICAN AMERICAN): >60 ML/MIN/1.73 M^2
ESTIMATED AVG GLUCOSE: 123 MG/DL (ref 68–131)
GLUCOSE SERPL-MCNC: 104 MG/DL (ref 70–110)
HBA1C MFR BLD: 5.9 % (ref 4–5.6)
HCT VFR BLD AUTO: 43.4 % (ref 40–54)
HDLC SERPL-MCNC: 27 MG/DL (ref 40–75)
HDLC SERPL: 21.1 % (ref 20–50)
HGB BLD-MCNC: 14 G/DL (ref 14–18)
IMM GRANULOCYTES # BLD AUTO: 0.04 K/UL (ref 0–0.04)
IMM GRANULOCYTES NFR BLD AUTO: 0.7 % (ref 0–0.5)
LDLC SERPL CALC-MCNC: 66 MG/DL (ref 63–159)
LYMPHOCYTES # BLD AUTO: 1.5 K/UL (ref 1–4.8)
LYMPHOCYTES NFR BLD: 24.6 % (ref 18–48)
MCH RBC QN AUTO: 28.5 PG (ref 27–31)
MCHC RBC AUTO-ENTMCNC: 32.3 G/DL (ref 32–36)
MCV RBC AUTO: 88 FL (ref 82–98)
MONOCYTES # BLD AUTO: 0.6 K/UL (ref 0.3–1)
MONOCYTES NFR BLD: 10 % (ref 4–15)
NEUTROPHILS # BLD AUTO: 3.5 K/UL (ref 1.8–7.7)
NEUTROPHILS NFR BLD: 59.8 % (ref 38–73)
NONHDLC SERPL-MCNC: 101 MG/DL
NRBC BLD-RTO: 0 /100 WBC
PLATELET # BLD AUTO: 172 K/UL (ref 150–450)
PMV BLD AUTO: 10.7 FL (ref 9.2–12.9)
POTASSIUM SERPL-SCNC: 4.6 MMOL/L (ref 3.5–5.1)
PROT SERPL-MCNC: 6.6 G/DL (ref 6–8.4)
RBC # BLD AUTO: 4.92 M/UL (ref 4.6–6.2)
SODIUM SERPL-SCNC: 132 MMOL/L (ref 136–145)
TRIGL SERPL-MCNC: 175 MG/DL (ref 30–150)
WBC # BLD AUTO: 5.9 K/UL (ref 3.9–12.7)

## 2021-07-09 PROCEDURE — 85025 COMPLETE CBC W/AUTO DIFF WBC: CPT | Performed by: INTERNAL MEDICINE

## 2021-07-09 PROCEDURE — 80061 LIPID PANEL: CPT | Performed by: INTERNAL MEDICINE

## 2021-07-09 PROCEDURE — 80053 COMPREHEN METABOLIC PANEL: CPT | Performed by: INTERNAL MEDICINE

## 2021-07-09 PROCEDURE — 83036 HEMOGLOBIN GLYCOSYLATED A1C: CPT | Performed by: INTERNAL MEDICINE

## 2021-07-09 PROCEDURE — 36415 COLL VENOUS BLD VENIPUNCTURE: CPT | Performed by: INTERNAL MEDICINE

## 2021-07-12 ENCOUNTER — OFFICE VISIT (OUTPATIENT)
Dept: FAMILY MEDICINE | Facility: CLINIC | Age: 71
End: 2021-07-12
Payer: MEDICARE

## 2021-07-12 VITALS
DIASTOLIC BLOOD PRESSURE: 66 MMHG | HEIGHT: 71 IN | OXYGEN SATURATION: 96 % | WEIGHT: 208.31 LBS | BODY MASS INDEX: 29.16 KG/M2 | RESPIRATION RATE: 16 BRPM | HEART RATE: 64 BPM | SYSTOLIC BLOOD PRESSURE: 118 MMHG | TEMPERATURE: 99 F

## 2021-07-12 DIAGNOSIS — Z79.01 ANTICOAGULATED: ICD-10-CM

## 2021-07-12 DIAGNOSIS — E87.1 HYPONATREMIA: ICD-10-CM

## 2021-07-12 DIAGNOSIS — E11.59 TYPE 2 DIABETES MELLITUS WITH OTHER CIRCULATORY COMPLICATIONS: Primary | ICD-10-CM

## 2021-07-12 DIAGNOSIS — I15.2 HYPERTENSION ASSOCIATED WITH DIABETES: ICD-10-CM

## 2021-07-12 DIAGNOSIS — Z12.5 PROSTATE CANCER SCREENING: ICD-10-CM

## 2021-07-12 DIAGNOSIS — E11.69 HYPERLIPIDEMIA ASSOCIATED WITH TYPE 2 DIABETES MELLITUS: ICD-10-CM

## 2021-07-12 DIAGNOSIS — E11.59 HYPERTENSION ASSOCIATED WITH DIABETES: ICD-10-CM

## 2021-07-12 DIAGNOSIS — E78.5 HYPERLIPIDEMIA ASSOCIATED WITH TYPE 2 DIABETES MELLITUS: ICD-10-CM

## 2021-07-12 DIAGNOSIS — Z23 NEED FOR 23-POLYVALENT PNEUMOCOCCAL POLYSACCHARIDE VACCINE: ICD-10-CM

## 2021-07-12 PROCEDURE — 3044F PR MOST RECENT HEMOGLOBIN A1C LEVEL <7.0%: ICD-10-PCS | Mod: CPTII,S$GLB,, | Performed by: INTERNAL MEDICINE

## 2021-07-12 PROCEDURE — 3008F BODY MASS INDEX DOCD: CPT | Mod: CPTII,S$GLB,, | Performed by: INTERNAL MEDICINE

## 2021-07-12 PROCEDURE — 3288F PR FALLS RISK ASSESSMENT DOCUMENTED: ICD-10-PCS | Mod: CPTII,S$GLB,, | Performed by: INTERNAL MEDICINE

## 2021-07-12 PROCEDURE — 3044F HG A1C LEVEL LT 7.0%: CPT | Mod: CPTII,S$GLB,, | Performed by: INTERNAL MEDICINE

## 2021-07-12 PROCEDURE — 99499 RISK ADDL DX/OHS AUDIT: ICD-10-PCS | Mod: S$GLB,,, | Performed by: INTERNAL MEDICINE

## 2021-07-12 PROCEDURE — 99214 OFFICE O/P EST MOD 30 MIN: CPT | Mod: 25,S$GLB,, | Performed by: INTERNAL MEDICINE

## 2021-07-12 PROCEDURE — 3078F DIAST BP <80 MM HG: CPT | Mod: CPTII,S$GLB,, | Performed by: INTERNAL MEDICINE

## 2021-07-12 PROCEDURE — 1159F PR MEDICATION LIST DOCUMENTED IN MEDICAL RECORD: ICD-10-PCS | Mod: S$GLB,,, | Performed by: INTERNAL MEDICINE

## 2021-07-12 PROCEDURE — 90732 PNEUMOCOCCAL POLYSACCHARIDE VACCINE 23-VALENT =>2YO SQ IM: ICD-10-PCS | Mod: S$GLB,,, | Performed by: INTERNAL MEDICINE

## 2021-07-12 PROCEDURE — 3074F SYST BP LT 130 MM HG: CPT | Mod: CPTII,S$GLB,, | Performed by: INTERNAL MEDICINE

## 2021-07-12 PROCEDURE — G0009 ADMIN PNEUMOCOCCAL VACCINE: HCPCS | Mod: S$GLB,,, | Performed by: INTERNAL MEDICINE

## 2021-07-12 PROCEDURE — 1125F PR PAIN SEVERITY QUANTIFIED, PAIN PRESENT: ICD-10-PCS | Mod: S$GLB,,, | Performed by: INTERNAL MEDICINE

## 2021-07-12 PROCEDURE — 1101F PR PT FALLS ASSESS DOC 0-1 FALLS W/OUT INJ PAST YR: ICD-10-PCS | Mod: CPTII,S$GLB,, | Performed by: INTERNAL MEDICINE

## 2021-07-12 PROCEDURE — 99214 PR OFFICE/OUTPT VISIT, EST, LEVL IV, 30-39 MIN: ICD-10-PCS | Mod: 25,S$GLB,, | Performed by: INTERNAL MEDICINE

## 2021-07-12 PROCEDURE — 90732 PPSV23 VACC 2 YRS+ SUBQ/IM: CPT | Mod: S$GLB,,, | Performed by: INTERNAL MEDICINE

## 2021-07-12 PROCEDURE — G0009 PNEUMOCOCCAL POLYSACCHARIDE VACCINE 23-VALENT =>2YO SQ IM: ICD-10-PCS | Mod: S$GLB,,, | Performed by: INTERNAL MEDICINE

## 2021-07-12 PROCEDURE — 99499 UNLISTED E&M SERVICE: CPT | Mod: S$GLB,,, | Performed by: INTERNAL MEDICINE

## 2021-07-12 PROCEDURE — 1159F MED LIST DOCD IN RCRD: CPT | Mod: S$GLB,,, | Performed by: INTERNAL MEDICINE

## 2021-07-12 PROCEDURE — 1101F PT FALLS ASSESS-DOCD LE1/YR: CPT | Mod: CPTII,S$GLB,, | Performed by: INTERNAL MEDICINE

## 2021-07-12 PROCEDURE — 3078F PR MOST RECENT DIASTOLIC BLOOD PRESSURE < 80 MM HG: ICD-10-PCS | Mod: CPTII,S$GLB,, | Performed by: INTERNAL MEDICINE

## 2021-07-12 PROCEDURE — 3008F PR BODY MASS INDEX (BMI) DOCUMENTED: ICD-10-PCS | Mod: CPTII,S$GLB,, | Performed by: INTERNAL MEDICINE

## 2021-07-12 PROCEDURE — 3074F PR MOST RECENT SYSTOLIC BLOOD PRESSURE < 130 MM HG: ICD-10-PCS | Mod: CPTII,S$GLB,, | Performed by: INTERNAL MEDICINE

## 2021-07-12 PROCEDURE — 3288F FALL RISK ASSESSMENT DOCD: CPT | Mod: CPTII,S$GLB,, | Performed by: INTERNAL MEDICINE

## 2021-07-12 PROCEDURE — 1125F AMNT PAIN NOTED PAIN PRSNT: CPT | Mod: S$GLB,,, | Performed by: INTERNAL MEDICINE

## 2021-08-03 ENCOUNTER — CLINICAL SUPPORT (OUTPATIENT)
Dept: FAMILY MEDICINE | Facility: CLINIC | Age: 71
End: 2021-08-03
Attending: INTERNAL MEDICINE
Payer: MEDICARE

## 2021-08-03 DIAGNOSIS — E11.9 DIABETES MELLITUS WITHOUT COMPLICATION: ICD-10-CM

## 2021-08-03 PROCEDURE — 92228 DIABETIC EYE SCREENING PHOTO: ICD-10-PCS | Mod: 26,S$GLB,, | Performed by: OPTOMETRIST

## 2021-08-03 PROCEDURE — 92228 IMG RTA DETC/MNTR DS PHY/QHP: CPT | Mod: TC,S$GLB,, | Performed by: INTERNAL MEDICINE

## 2021-08-03 PROCEDURE — 92228 IMG RTA DETC/MNTR DS PHY/QHP: CPT | Mod: 26,S$GLB,, | Performed by: OPTOMETRIST

## 2021-08-03 PROCEDURE — 92228 DIABETIC EYE SCREENING PHOTO: ICD-10-PCS | Mod: TC,S$GLB,, | Performed by: INTERNAL MEDICINE

## 2021-08-27 ENCOUNTER — TELEPHONE (OUTPATIENT)
Dept: FAMILY MEDICINE | Facility: CLINIC | Age: 71
End: 2021-08-27

## 2021-08-27 DIAGNOSIS — E11.21 WELL CONTROLLED TYPE 2 DIABETES MELLITUS WITH NEPHROPATHY: ICD-10-CM

## 2021-08-27 RX ORDER — LISINOPRIL 20 MG/1
20 TABLET ORAL DAILY
Qty: 90 TABLET | Refills: 0 | Status: SHIPPED | OUTPATIENT
Start: 2021-08-27 | End: 2021-11-29 | Stop reason: SDUPTHER

## 2021-09-13 ENCOUNTER — TELEPHONE (OUTPATIENT)
Dept: FAMILY MEDICINE | Facility: CLINIC | Age: 71
End: 2021-09-13

## 2021-09-13 ENCOUNTER — HOSPITAL ENCOUNTER (OUTPATIENT)
Facility: HOSPITAL | Age: 71
Discharge: HOME | End: 2021-09-14
Attending: EMERGENCY MEDICINE | Admitting: INTERNAL MEDICINE
Payer: MEDICARE

## 2021-09-13 DIAGNOSIS — M48.00 SPINAL STENOSIS, UNSPECIFIED SPINAL REGION: ICD-10-CM

## 2021-09-13 DIAGNOSIS — S32.000A COMPRESSION FRACTURE OF LUMBAR VERTEBRA, INITIAL ENCOUNTER, UNSPECIFIED LUMBAR VERTEBRAL LEVEL: ICD-10-CM

## 2021-09-13 DIAGNOSIS — K59.00 CONSTIPATION, UNSPECIFIED CONSTIPATION TYPE: ICD-10-CM

## 2021-09-13 DIAGNOSIS — E87.1 HYPONATREMIA: Primary | ICD-10-CM

## 2021-09-13 DIAGNOSIS — G89.29 CHRONIC LOW BACK PAIN, UNSPECIFIED BACK PAIN LATERALITY, UNSPECIFIED WHETHER SCIATICA PRESENT: Primary | ICD-10-CM

## 2021-09-13 DIAGNOSIS — M54.50 CHRONIC LOW BACK PAIN, UNSPECIFIED BACK PAIN LATERALITY, UNSPECIFIED WHETHER SCIATICA PRESENT: Primary | ICD-10-CM

## 2021-09-13 PROBLEM — S39.012A LUMBAR STRAIN: Status: ACTIVE | Noted: 2021-09-13

## 2021-09-13 LAB
ALBUMIN SERPL BCP-MCNC: 4.2 G/DL (ref 3.5–5.2)
ALP SERPL-CCNC: 73 U/L (ref 55–135)
ALT SERPL W/O P-5'-P-CCNC: 11 U/L (ref 10–44)
ANION GAP SERPL CALC-SCNC: 11 MMOL/L (ref 8–16)
AST SERPL-CCNC: 12 U/L (ref 10–40)
BASOPHILS # BLD AUTO: 0.05 K/UL (ref 0–0.2)
BASOPHILS NFR BLD: 0.7 % (ref 0–1.9)
BILIRUB SERPL-MCNC: 0.6 MG/DL (ref 0.1–1)
BILIRUB UR QL STRIP: NEGATIVE
BUN SERPL-MCNC: 10 MG/DL (ref 8–23)
CALCIUM SERPL-MCNC: 10.1 MG/DL (ref 8.7–10.5)
CHLORIDE SERPL-SCNC: 92 MMOL/L (ref 95–110)
CLARITY UR: CLEAR
CO2 SERPL-SCNC: 26 MMOL/L (ref 23–29)
COLOR UR: YELLOW
CREAT SERPL-MCNC: 1 MG/DL (ref 0.5–1.4)
DIFFERENTIAL METHOD: ABNORMAL
EOSINOPHIL # BLD AUTO: 0.2 K/UL (ref 0–0.5)
EOSINOPHIL NFR BLD: 3 % (ref 0–8)
ERYTHROCYTE [DISTWIDTH] IN BLOOD BY AUTOMATED COUNT: 13.7 % (ref 11.5–14.5)
EST. GFR  (AFRICAN AMERICAN): >60 ML/MIN/1.73 M^2
EST. GFR  (NON AFRICAN AMERICAN): >60 ML/MIN/1.73 M^2
GLUCOSE SERPL-MCNC: 99 MG/DL (ref 70–110)
GLUCOSE UR QL STRIP: NEGATIVE
HCT VFR BLD AUTO: 43.9 % (ref 40–54)
HGB BLD-MCNC: 14.6 G/DL (ref 14–18)
HGB UR QL STRIP: ABNORMAL
IMM GRANULOCYTES # BLD AUTO: 0.03 K/UL (ref 0–0.04)
IMM GRANULOCYTES NFR BLD AUTO: 0.4 % (ref 0–0.5)
KETONES UR QL STRIP: NEGATIVE
LEUKOCYTE ESTERASE UR QL STRIP: NEGATIVE
LIPASE SERPL-CCNC: 57 U/L (ref 4–60)
LYMPHOCYTES # BLD AUTO: 1 K/UL (ref 1–4.8)
LYMPHOCYTES NFR BLD: 13.7 % (ref 18–48)
MCH RBC QN AUTO: 28.9 PG (ref 27–31)
MCHC RBC AUTO-ENTMCNC: 33.3 G/DL (ref 32–36)
MCV RBC AUTO: 87 FL (ref 82–98)
MONOCYTES # BLD AUTO: 0.8 K/UL (ref 0.3–1)
MONOCYTES NFR BLD: 10.4 % (ref 4–15)
NEUTROPHILS # BLD AUTO: 5.4 K/UL (ref 1.8–7.7)
NEUTROPHILS NFR BLD: 71.8 % (ref 38–73)
NITRITE UR QL STRIP: NEGATIVE
NRBC BLD-RTO: 0 /100 WBC
PH UR STRIP: 6 [PH] (ref 5–8)
PLATELET # BLD AUTO: 184 K/UL (ref 150–450)
PMV BLD AUTO: 10.4 FL (ref 9.2–12.9)
POCT GLUCOSE: 110 MG/DL (ref 70–110)
POTASSIUM SERPL-SCNC: 4.7 MMOL/L (ref 3.5–5.1)
PROT SERPL-MCNC: 7.6 G/DL (ref 6–8.4)
PROT UR QL STRIP: NEGATIVE
RBC # BLD AUTO: 5.06 M/UL (ref 4.6–6.2)
SARS-COV-2 RDRP RESP QL NAA+PROBE: NEGATIVE
SODIUM SERPL-SCNC: 129 MMOL/L (ref 136–145)
SODIUM UR-SCNC: 33 MMOL/L (ref 20–250)
SP GR UR STRIP: 1.01 (ref 1–1.03)
URN SPEC COLLECT METH UR: ABNORMAL
UROBILINOGEN UR STRIP-ACNC: 1 EU/DL
WBC # BLD AUTO: 7.57 K/UL (ref 3.9–12.7)

## 2021-09-13 PROCEDURE — 96361 HYDRATE IV INFUSION ADD-ON: CPT

## 2021-09-13 PROCEDURE — 81003 URINALYSIS AUTO W/O SCOPE: CPT | Performed by: EMERGENCY MEDICINE

## 2021-09-13 PROCEDURE — 94761 N-INVAS EAR/PLS OXIMETRY MLT: CPT

## 2021-09-13 PROCEDURE — 99219 PR INITIAL OBSERVATION CARE,LEVL II: ICD-10-PCS | Mod: ,,, | Performed by: STUDENT IN AN ORGANIZED HEALTH CARE EDUCATION/TRAINING PROGRAM

## 2021-09-13 PROCEDURE — 83690 ASSAY OF LIPASE: CPT | Performed by: EMERGENCY MEDICINE

## 2021-09-13 PROCEDURE — 25000003 PHARM REV CODE 250: Performed by: NURSE PRACTITIONER

## 2021-09-13 PROCEDURE — 83935 ASSAY OF URINE OSMOLALITY: CPT | Performed by: EMERGENCY MEDICINE

## 2021-09-13 PROCEDURE — 63600175 PHARM REV CODE 636 W HCPCS: Performed by: EMERGENCY MEDICINE

## 2021-09-13 PROCEDURE — 25500020 PHARM REV CODE 255

## 2021-09-13 PROCEDURE — 99285 EMERGENCY DEPT VISIT HI MDM: CPT | Mod: 25

## 2021-09-13 PROCEDURE — 85025 COMPLETE CBC W/AUTO DIFF WBC: CPT | Performed by: EMERGENCY MEDICINE

## 2021-09-13 PROCEDURE — 83930 ASSAY OF BLOOD OSMOLALITY: CPT | Performed by: EMERGENCY MEDICINE

## 2021-09-13 PROCEDURE — 99219 PR INITIAL OBSERVATION CARE,LEVL II: CPT | Mod: ,,, | Performed by: STUDENT IN AN ORGANIZED HEALTH CARE EDUCATION/TRAINING PROGRAM

## 2021-09-13 PROCEDURE — 36415 COLL VENOUS BLD VENIPUNCTURE: CPT | Performed by: EMERGENCY MEDICINE

## 2021-09-13 PROCEDURE — 25000003 PHARM REV CODE 250: Performed by: EMERGENCY MEDICINE

## 2021-09-13 PROCEDURE — G0378 HOSPITAL OBSERVATION PER HR: HCPCS

## 2021-09-13 PROCEDURE — 99900035 HC TECH TIME PER 15 MIN (STAT)

## 2021-09-13 PROCEDURE — 96374 THER/PROPH/DIAG INJ IV PUSH: CPT | Mod: 59

## 2021-09-13 PROCEDURE — 84300 ASSAY OF URINE SODIUM: CPT | Performed by: EMERGENCY MEDICINE

## 2021-09-13 PROCEDURE — U0002 COVID-19 LAB TEST NON-CDC: HCPCS | Performed by: EMERGENCY MEDICINE

## 2021-09-13 PROCEDURE — 80053 COMPREHEN METABOLIC PANEL: CPT | Performed by: EMERGENCY MEDICINE

## 2021-09-13 RX ORDER — SODIUM CHLORIDE 9 MG/ML
INJECTION, SOLUTION INTRAVENOUS CONTINUOUS
Status: DISCONTINUED | OUTPATIENT
Start: 2021-09-13 | End: 2021-09-14 | Stop reason: HOSPADM

## 2021-09-13 RX ORDER — LANOLIN ALCOHOL/MO/W.PET/CERES
800 CREAM (GRAM) TOPICAL
Status: DISCONTINUED | OUTPATIENT
Start: 2021-09-13 | End: 2021-09-14 | Stop reason: HOSPADM

## 2021-09-13 RX ORDER — SODIUM CHLORIDE 0.9 % (FLUSH) 0.9 %
10 SYRINGE (ML) INJECTION
Status: DISCONTINUED | OUTPATIENT
Start: 2021-09-13 | End: 2021-09-14 | Stop reason: HOSPADM

## 2021-09-13 RX ORDER — METOPROLOL SUCCINATE 25 MG/1
25 TABLET, EXTENDED RELEASE ORAL DAILY
Status: DISCONTINUED | OUTPATIENT
Start: 2021-09-14 | End: 2021-09-13 | Stop reason: SDUPTHER

## 2021-09-13 RX ORDER — ACETAMINOPHEN 500 MG
1000 TABLET ORAL EVERY 6 HOURS PRN
Status: DISCONTINUED | OUTPATIENT
Start: 2021-09-13 | End: 2021-09-14 | Stop reason: HOSPADM

## 2021-09-13 RX ORDER — METOPROLOL TARTRATE 25 MG/1
25 TABLET, FILM COATED ORAL 2 TIMES DAILY
Status: DISCONTINUED | OUTPATIENT
Start: 2021-09-13 | End: 2021-09-13

## 2021-09-13 RX ORDER — PROPAFENONE HYDROCHLORIDE 150 MG/1
150 TABLET, COATED ORAL 2 TIMES DAILY
Status: DISCONTINUED | OUTPATIENT
Start: 2021-09-14 | End: 2021-09-14 | Stop reason: HOSPADM

## 2021-09-13 RX ORDER — IBUPROFEN 200 MG
24 TABLET ORAL
Status: DISCONTINUED | OUTPATIENT
Start: 2021-09-13 | End: 2021-09-14 | Stop reason: HOSPADM

## 2021-09-13 RX ORDER — INSULIN ASPART 100 [IU]/ML
1-10 INJECTION, SOLUTION INTRAVENOUS; SUBCUTANEOUS
Status: DISCONTINUED | OUTPATIENT
Start: 2021-09-13 | End: 2021-09-14 | Stop reason: HOSPADM

## 2021-09-13 RX ORDER — MORPHINE SULFATE 4 MG/ML
4 INJECTION, SOLUTION INTRAMUSCULAR; INTRAVENOUS
Status: COMPLETED | OUTPATIENT
Start: 2021-09-13 | End: 2021-09-13

## 2021-09-13 RX ORDER — ACETAMINOPHEN 325 MG/1
650 TABLET ORAL
Status: COMPLETED | OUTPATIENT
Start: 2021-09-13 | End: 2021-09-13

## 2021-09-13 RX ORDER — TALC
9 POWDER (GRAM) TOPICAL NIGHTLY PRN
Status: DISCONTINUED | OUTPATIENT
Start: 2021-09-13 | End: 2021-09-14 | Stop reason: HOSPADM

## 2021-09-13 RX ORDER — ALBUTEROL SULFATE 90 UG/1
2 AEROSOL, METERED RESPIRATORY (INHALATION) EVERY 6 HOURS PRN
Status: DISCONTINUED | OUTPATIENT
Start: 2021-09-13 | End: 2021-09-14 | Stop reason: HOSPADM

## 2021-09-13 RX ORDER — METOPROLOL SUCCINATE 25 MG/1
25 TABLET, EXTENDED RELEASE ORAL DAILY
Status: DISCONTINUED | OUTPATIENT
Start: 2021-09-13 | End: 2021-09-14 | Stop reason: HOSPADM

## 2021-09-13 RX ORDER — FUROSEMIDE 20 MG/1
20 TABLET ORAL DAILY PRN
Status: DISCONTINUED | OUTPATIENT
Start: 2021-09-13 | End: 2021-09-14 | Stop reason: HOSPADM

## 2021-09-13 RX ORDER — ONDANSETRON 2 MG/ML
8 INJECTION INTRAMUSCULAR; INTRAVENOUS EVERY 8 HOURS PRN
Status: DISCONTINUED | OUTPATIENT
Start: 2021-09-13 | End: 2021-09-14 | Stop reason: HOSPADM

## 2021-09-13 RX ORDER — PROPAFENONE HYDROCHLORIDE 150 MG/1
150 TABLET, COATED ORAL
Status: COMPLETED | OUTPATIENT
Start: 2021-09-13 | End: 2021-09-13

## 2021-09-13 RX ORDER — IBUPROFEN 200 MG
16 TABLET ORAL
Status: DISCONTINUED | OUTPATIENT
Start: 2021-09-13 | End: 2021-09-14 | Stop reason: HOSPADM

## 2021-09-13 RX ORDER — PROPAFENONE HYDROCHLORIDE 150 MG/1
150 TABLET, COATED ORAL 2 TIMES DAILY
Status: DISCONTINUED | OUTPATIENT
Start: 2021-09-13 | End: 2021-09-13

## 2021-09-13 RX ORDER — PSEUDOEPHEDRINE/ACETAMINOPHEN 30MG-500MG
100 TABLET ORAL
Status: DISCONTINUED | OUTPATIENT
Start: 2021-09-13 | End: 2021-09-13

## 2021-09-13 RX ORDER — LANOLIN ALCOHOL/MO/W.PET/CERES
400 CREAM (GRAM) TOPICAL DAILY
Status: DISCONTINUED | OUTPATIENT
Start: 2021-09-14 | End: 2021-09-14 | Stop reason: HOSPADM

## 2021-09-13 RX ORDER — SYRING-NEEDL,DISP,INSUL,0.3 ML 29 G X1/2"
296 SYRINGE, EMPTY DISPOSABLE MISCELLANEOUS
Status: DISCONTINUED | OUTPATIENT
Start: 2021-09-13 | End: 2021-09-13

## 2021-09-13 RX ORDER — LISINOPRIL 10 MG/1
20 TABLET ORAL DAILY
Status: DISCONTINUED | OUTPATIENT
Start: 2021-09-14 | End: 2021-09-14 | Stop reason: HOSPADM

## 2021-09-13 RX ORDER — GLUCAGON 1 MG
1 KIT INJECTION
Status: DISCONTINUED | OUTPATIENT
Start: 2021-09-13 | End: 2021-09-14 | Stop reason: HOSPADM

## 2021-09-13 RX ORDER — AMOXICILLIN 250 MG
1 CAPSULE ORAL 2 TIMES DAILY
Status: DISCONTINUED | OUTPATIENT
Start: 2021-09-13 | End: 2021-09-14

## 2021-09-13 RX ORDER — ATORVASTATIN CALCIUM 20 MG/1
20 TABLET, FILM COATED ORAL DAILY
Status: DISCONTINUED | OUTPATIENT
Start: 2021-09-14 | End: 2021-09-14 | Stop reason: HOSPADM

## 2021-09-13 RX ADMIN — ACETAMINOPHEN 1000 MG: 500 TABLET ORAL at 10:09

## 2021-09-13 RX ADMIN — DOCUSATE SODIUM 50 MG AND SENNOSIDES 8.6 MG 1 TABLET: 8.6; 5 TABLET, FILM COATED ORAL at 09:09

## 2021-09-13 RX ADMIN — ACETAMINOPHEN 650 MG: 325 TABLET ORAL at 03:09

## 2021-09-13 RX ADMIN — SODIUM CHLORIDE 500 ML: 0.9 INJECTION, SOLUTION INTRAVENOUS at 05:09

## 2021-09-13 RX ADMIN — IOHEXOL 100 ML: 350 INJECTION, SOLUTION INTRAVENOUS at 04:09

## 2021-09-13 RX ADMIN — PROPAFENONE HYDROCHLORIDE 150 MG: 150 TABLET, FILM COATED ORAL at 05:09

## 2021-09-13 RX ADMIN — APIXABAN 5 MG: 2.5 TABLET, FILM COATED ORAL at 09:09

## 2021-09-13 RX ADMIN — METOPROLOL SUCCINATE 25 MG: 25 TABLET, EXTENDED RELEASE ORAL at 05:09

## 2021-09-13 RX ADMIN — MAGNESIUM CITRATE: 1.75 LIQUID ORAL at 09:09

## 2021-09-13 RX ADMIN — SODIUM CHLORIDE: 0.9 INJECTION, SOLUTION INTRAVENOUS at 09:09

## 2021-09-13 RX ADMIN — MORPHINE SULFATE 4 MG: 4 INJECTION INTRAVENOUS at 05:09

## 2021-09-14 ENCOUNTER — TELEPHONE (OUTPATIENT)
Dept: PAIN MEDICINE | Facility: CLINIC | Age: 71
End: 2021-09-14

## 2021-09-14 VITALS
DIASTOLIC BLOOD PRESSURE: 67 MMHG | RESPIRATION RATE: 18 BRPM | HEART RATE: 70 BPM | BODY MASS INDEX: 28.98 KG/M2 | WEIGHT: 207 LBS | SYSTOLIC BLOOD PRESSURE: 144 MMHG | OXYGEN SATURATION: 95 % | HEIGHT: 71 IN | TEMPERATURE: 96 F

## 2021-09-14 PROBLEM — M48.00 SPINAL STENOSIS: Status: ACTIVE | Noted: 2021-09-14

## 2021-09-14 LAB
ALBUMIN SERPL BCP-MCNC: 3.9 G/DL (ref 3.5–5.2)
ALP SERPL-CCNC: 66 U/L (ref 55–135)
ALT SERPL W/O P-5'-P-CCNC: 11 U/L (ref 10–44)
ANION GAP SERPL CALC-SCNC: 11 MMOL/L (ref 8–16)
AST SERPL-CCNC: 11 U/L (ref 10–40)
BASOPHILS # BLD AUTO: 0.05 K/UL (ref 0–0.2)
BASOPHILS NFR BLD: 0.8 % (ref 0–1.9)
BILIRUB SERPL-MCNC: 0.6 MG/DL (ref 0.1–1)
BUN SERPL-MCNC: 9 MG/DL (ref 8–23)
CALCIUM SERPL-MCNC: 9.9 MG/DL (ref 8.7–10.5)
CHLORIDE SERPL-SCNC: 99 MMOL/L (ref 95–110)
CO2 SERPL-SCNC: 24 MMOL/L (ref 23–29)
CREAT SERPL-MCNC: 1 MG/DL (ref 0.5–1.4)
DIFFERENTIAL METHOD: ABNORMAL
EOSINOPHIL # BLD AUTO: 0.2 K/UL (ref 0–0.5)
EOSINOPHIL NFR BLD: 3.6 % (ref 0–8)
ERYTHROCYTE [DISTWIDTH] IN BLOOD BY AUTOMATED COUNT: 13.7 % (ref 11.5–14.5)
EST. GFR  (AFRICAN AMERICAN): >60 ML/MIN/1.73 M^2
EST. GFR  (NON AFRICAN AMERICAN): >60 ML/MIN/1.73 M^2
GLUCOSE SERPL-MCNC: 115 MG/DL (ref 70–110)
HCT VFR BLD AUTO: 42.2 % (ref 40–54)
HGB BLD-MCNC: 14 G/DL (ref 14–18)
IMM GRANULOCYTES # BLD AUTO: 0.02 K/UL (ref 0–0.04)
IMM GRANULOCYTES NFR BLD AUTO: 0.3 % (ref 0–0.5)
LYMPHOCYTES # BLD AUTO: 1.1 K/UL (ref 1–4.8)
LYMPHOCYTES NFR BLD: 16.6 % (ref 18–48)
MAGNESIUM SERPL-MCNC: 1.8 MG/DL (ref 1.6–2.6)
MCH RBC QN AUTO: 28.7 PG (ref 27–31)
MCHC RBC AUTO-ENTMCNC: 33.2 G/DL (ref 32–36)
MCV RBC AUTO: 87 FL (ref 82–98)
MONOCYTES # BLD AUTO: 0.7 K/UL (ref 0.3–1)
MONOCYTES NFR BLD: 10.7 % (ref 4–15)
NEUTROPHILS # BLD AUTO: 4.4 K/UL (ref 1.8–7.7)
NEUTROPHILS NFR BLD: 68 % (ref 38–73)
NRBC BLD-RTO: 0 /100 WBC
OSMOLALITY SERPL: 273 MOSM/KG (ref 280–300)
OSMOLALITY UR: 227 MOSM/KG (ref 50–1200)
PHOSPHATE SERPL-MCNC: 3.8 MG/DL (ref 2.7–4.5)
PLATELET # BLD AUTO: 179 K/UL (ref 150–450)
PMV BLD AUTO: 10.5 FL (ref 9.2–12.9)
POCT GLUCOSE: 114 MG/DL (ref 70–110)
POCT GLUCOSE: 149 MG/DL (ref 70–110)
POTASSIUM SERPL-SCNC: 4.8 MMOL/L (ref 3.5–5.1)
PROT SERPL-MCNC: 7 G/DL (ref 6–8.4)
RBC # BLD AUTO: 4.87 M/UL (ref 4.6–6.2)
SODIUM SERPL-SCNC: 134 MMOL/L (ref 136–145)
WBC # BLD AUTO: 6.43 K/UL (ref 3.9–12.7)

## 2021-09-14 PROCEDURE — 85025 COMPLETE CBC W/AUTO DIFF WBC: CPT | Performed by: NURSE PRACTITIONER

## 2021-09-14 PROCEDURE — 83735 ASSAY OF MAGNESIUM: CPT | Performed by: NURSE PRACTITIONER

## 2021-09-14 PROCEDURE — 94761 N-INVAS EAR/PLS OXIMETRY MLT: CPT

## 2021-09-14 PROCEDURE — 25000003 PHARM REV CODE 250: Performed by: STUDENT IN AN ORGANIZED HEALTH CARE EDUCATION/TRAINING PROGRAM

## 2021-09-14 PROCEDURE — 99217 PR OBSERVATION CARE DISCHARGE: ICD-10-PCS | Mod: ,,, | Performed by: STUDENT IN AN ORGANIZED HEALTH CARE EDUCATION/TRAINING PROGRAM

## 2021-09-14 PROCEDURE — 25000003 PHARM REV CODE 250: Performed by: EMERGENCY MEDICINE

## 2021-09-14 PROCEDURE — 99217 PR OBSERVATION CARE DISCHARGE: CPT | Mod: ,,, | Performed by: STUDENT IN AN ORGANIZED HEALTH CARE EDUCATION/TRAINING PROGRAM

## 2021-09-14 PROCEDURE — 25000003 PHARM REV CODE 250: Performed by: NURSE PRACTITIONER

## 2021-09-14 PROCEDURE — 84100 ASSAY OF PHOSPHORUS: CPT | Performed by: NURSE PRACTITIONER

## 2021-09-14 PROCEDURE — 80053 COMPREHEN METABOLIC PANEL: CPT | Performed by: NURSE PRACTITIONER

## 2021-09-14 PROCEDURE — G0378 HOSPITAL OBSERVATION PER HR: HCPCS

## 2021-09-14 PROCEDURE — 99900035 HC TECH TIME PER 15 MIN (STAT)

## 2021-09-14 PROCEDURE — 36415 COLL VENOUS BLD VENIPUNCTURE: CPT | Performed by: NURSE PRACTITIONER

## 2021-09-14 RX ORDER — LIDOCAINE 50 MG/G
1 PATCH TOPICAL
Status: DISCONTINUED | OUTPATIENT
Start: 2021-09-14 | End: 2021-09-14 | Stop reason: HOSPADM

## 2021-09-14 RX ORDER — POLYETHYLENE GLYCOL 3350 17 G/17G
17 POWDER, FOR SOLUTION ORAL DAILY
Qty: 30 EACH | Refills: 0 | Status: SHIPPED | OUTPATIENT
Start: 2021-09-14 | End: 2021-10-14

## 2021-09-14 RX ORDER — POLYETHYLENE GLYCOL 3350 17 G/17G
17 POWDER, FOR SOLUTION ORAL DAILY
Status: DISCONTINUED | OUTPATIENT
Start: 2021-09-14 | End: 2021-09-14 | Stop reason: HOSPADM

## 2021-09-14 RX ORDER — PREGABALIN 75 MG/1
75 CAPSULE ORAL 2 TIMES DAILY
Qty: 60 CAPSULE | Refills: 0 | Status: SHIPPED | OUTPATIENT
Start: 2021-09-14 | End: 2023-01-17

## 2021-09-14 RX ORDER — LORAZEPAM 0.5 MG/1
0.5 TABLET ORAL
Status: DISCONTINUED | OUTPATIENT
Start: 2021-09-14 | End: 2021-09-14 | Stop reason: HOSPADM

## 2021-09-14 RX ADMIN — LISINOPRIL 20 MG: 10 TABLET ORAL at 09:09

## 2021-09-14 RX ADMIN — LORAZEPAM 0.5 MG: 0.5 TABLET ORAL at 11:09

## 2021-09-14 RX ADMIN — ATORVASTATIN CALCIUM 20 MG: 20 TABLET, FILM COATED ORAL at 09:09

## 2021-09-14 RX ADMIN — Medication 400 MG: at 09:09

## 2021-09-14 RX ADMIN — APIXABAN 5 MG: 2.5 TABLET, FILM COATED ORAL at 09:09

## 2021-09-14 RX ADMIN — LIDOCAINE 1 PATCH: 50 PATCH TOPICAL at 09:09

## 2021-09-14 RX ADMIN — POLYETHYLENE GLYCOL (3350) 17 G: 17 POWDER, FOR SOLUTION ORAL at 09:09

## 2021-09-14 RX ADMIN — PROPAFENONE HYDROCHLORIDE 150 MG: 150 TABLET, FILM COATED ORAL at 09:09

## 2021-09-14 RX ADMIN — METOPROLOL SUCCINATE 25 MG: 25 TABLET, EXTENDED RELEASE ORAL at 09:09

## 2021-09-15 ENCOUNTER — TELEPHONE (OUTPATIENT)
Dept: MEDSURG UNIT | Facility: HOSPITAL | Age: 71
End: 2021-09-15

## 2021-09-15 ENCOUNTER — TELEPHONE (OUTPATIENT)
Dept: FAMILY MEDICINE | Facility: CLINIC | Age: 71
End: 2021-09-15

## 2021-09-17 ENCOUNTER — OFFICE VISIT (OUTPATIENT)
Dept: SPINE | Facility: CLINIC | Age: 71
End: 2021-09-17
Payer: MEDICARE

## 2021-09-17 ENCOUNTER — PATIENT OUTREACH (OUTPATIENT)
Dept: ADMINISTRATIVE | Facility: OTHER | Age: 71
End: 2021-09-17

## 2021-09-17 VITALS — BODY MASS INDEX: 28.98 KG/M2 | HEIGHT: 71 IN | RESPIRATION RATE: 20 BRPM | WEIGHT: 207 LBS

## 2021-09-17 DIAGNOSIS — M48.00 SPINAL STENOSIS, UNSPECIFIED SPINAL REGION: ICD-10-CM

## 2021-09-17 DIAGNOSIS — M54.50 CHRONIC LOW BACK PAIN, UNSPECIFIED BACK PAIN LATERALITY, UNSPECIFIED WHETHER SCIATICA PRESENT: ICD-10-CM

## 2021-09-17 DIAGNOSIS — S32.020A COMPRESSION FRACTURE OF L2 VERTEBRA, INITIAL ENCOUNTER: Primary | ICD-10-CM

## 2021-09-17 DIAGNOSIS — G89.29 CHRONIC LOW BACK PAIN, UNSPECIFIED BACK PAIN LATERALITY, UNSPECIFIED WHETHER SCIATICA PRESENT: ICD-10-CM

## 2021-09-17 PROCEDURE — 99204 OFFICE O/P NEW MOD 45 MIN: CPT | Mod: S$GLB,,, | Performed by: PHYSICAL MEDICINE & REHABILITATION

## 2021-09-17 PROCEDURE — 3060F POS MICROALBUMINURIA REV: CPT | Mod: CPTII,S$GLB,, | Performed by: PHYSICAL MEDICINE & REHABILITATION

## 2021-09-17 PROCEDURE — 3288F FALL RISK ASSESSMENT DOCD: CPT | Mod: CPTII,S$GLB,, | Performed by: PHYSICAL MEDICINE & REHABILITATION

## 2021-09-17 PROCEDURE — 4010F PR ACE/ARB THEARPY RXD/TAKEN: ICD-10-PCS | Mod: CPTII,S$GLB,, | Performed by: PHYSICAL MEDICINE & REHABILITATION

## 2021-09-17 PROCEDURE — 1101F PT FALLS ASSESS-DOCD LE1/YR: CPT | Mod: CPTII,S$GLB,, | Performed by: PHYSICAL MEDICINE & REHABILITATION

## 2021-09-17 PROCEDURE — 3044F PR MOST RECENT HEMOGLOBIN A1C LEVEL <7.0%: ICD-10-PCS | Mod: CPTII,S$GLB,, | Performed by: PHYSICAL MEDICINE & REHABILITATION

## 2021-09-17 PROCEDURE — 1125F PR PAIN SEVERITY QUANTIFIED, PAIN PRESENT: ICD-10-PCS | Mod: CPTII,S$GLB,, | Performed by: PHYSICAL MEDICINE & REHABILITATION

## 2021-09-17 PROCEDURE — 1159F MED LIST DOCD IN RCRD: CPT | Mod: CPTII,S$GLB,, | Performed by: PHYSICAL MEDICINE & REHABILITATION

## 2021-09-17 PROCEDURE — 1125F AMNT PAIN NOTED PAIN PRSNT: CPT | Mod: CPTII,S$GLB,, | Performed by: PHYSICAL MEDICINE & REHABILITATION

## 2021-09-17 PROCEDURE — 1159F PR MEDICATION LIST DOCUMENTED IN MEDICAL RECORD: ICD-10-PCS | Mod: CPTII,S$GLB,, | Performed by: PHYSICAL MEDICINE & REHABILITATION

## 2021-09-17 PROCEDURE — 4010F ACE/ARB THERAPY RXD/TAKEN: CPT | Mod: CPTII,S$GLB,, | Performed by: PHYSICAL MEDICINE & REHABILITATION

## 2021-09-17 PROCEDURE — 99204 PR OFFICE/OUTPT VISIT, NEW, LEVL IV, 45-59 MIN: ICD-10-PCS | Mod: S$GLB,,, | Performed by: PHYSICAL MEDICINE & REHABILITATION

## 2021-09-17 PROCEDURE — 3288F PR FALLS RISK ASSESSMENT DOCUMENTED: ICD-10-PCS | Mod: CPTII,S$GLB,, | Performed by: PHYSICAL MEDICINE & REHABILITATION

## 2021-09-17 PROCEDURE — 3008F BODY MASS INDEX DOCD: CPT | Mod: CPTII,S$GLB,, | Performed by: PHYSICAL MEDICINE & REHABILITATION

## 2021-09-17 PROCEDURE — 3066F PR DOCUMENTATION OF TREATMENT FOR NEPHROPATHY: ICD-10-PCS | Mod: CPTII,S$GLB,, | Performed by: PHYSICAL MEDICINE & REHABILITATION

## 2021-09-17 PROCEDURE — 1160F RVW MEDS BY RX/DR IN RCRD: CPT | Mod: CPTII,S$GLB,, | Performed by: PHYSICAL MEDICINE & REHABILITATION

## 2021-09-17 PROCEDURE — 1160F PR REVIEW ALL MEDS BY PRESCRIBER/CLIN PHARMACIST DOCUMENTED: ICD-10-PCS | Mod: CPTII,S$GLB,, | Performed by: PHYSICAL MEDICINE & REHABILITATION

## 2021-09-17 PROCEDURE — 3066F NEPHROPATHY DOC TX: CPT | Mod: CPTII,S$GLB,, | Performed by: PHYSICAL MEDICINE & REHABILITATION

## 2021-09-17 PROCEDURE — 3044F HG A1C LEVEL LT 7.0%: CPT | Mod: CPTII,S$GLB,, | Performed by: PHYSICAL MEDICINE & REHABILITATION

## 2021-09-17 PROCEDURE — 1101F PR PT FALLS ASSESS DOC 0-1 FALLS W/OUT INJ PAST YR: ICD-10-PCS | Mod: CPTII,S$GLB,, | Performed by: PHYSICAL MEDICINE & REHABILITATION

## 2021-09-17 PROCEDURE — 3008F PR BODY MASS INDEX (BMI) DOCUMENTED: ICD-10-PCS | Mod: CPTII,S$GLB,, | Performed by: PHYSICAL MEDICINE & REHABILITATION

## 2021-09-17 PROCEDURE — 3060F PR POS MICROALBUMINURIA RESULT DOCUMENTED/REVIEW: ICD-10-PCS | Mod: CPTII,S$GLB,, | Performed by: PHYSICAL MEDICINE & REHABILITATION

## 2021-09-20 ENCOUNTER — TELEPHONE (OUTPATIENT)
Dept: PHYSICAL MEDICINE AND REHAB | Facility: CLINIC | Age: 71
End: 2021-09-20

## 2021-09-20 ENCOUNTER — TELEPHONE (OUTPATIENT)
Dept: SPINE | Facility: CLINIC | Age: 71
End: 2021-09-20

## 2021-09-21 ENCOUNTER — TELEPHONE (OUTPATIENT)
Dept: PAIN MEDICINE | Facility: CLINIC | Age: 71
End: 2021-09-21

## 2021-09-22 ENCOUNTER — TELEPHONE (OUTPATIENT)
Dept: FAMILY MEDICINE | Facility: CLINIC | Age: 71
End: 2021-09-22

## 2021-09-22 ENCOUNTER — OFFICE VISIT (OUTPATIENT)
Dept: FAMILY MEDICINE | Facility: CLINIC | Age: 71
End: 2021-09-22
Payer: MEDICARE

## 2021-09-22 VITALS
RESPIRATION RATE: 19 BRPM | DIASTOLIC BLOOD PRESSURE: 78 MMHG | HEART RATE: 74 BPM | TEMPERATURE: 98 F | SYSTOLIC BLOOD PRESSURE: 124 MMHG | BODY MASS INDEX: 28.37 KG/M2 | WEIGHT: 202.63 LBS | HEIGHT: 71 IN | OXYGEN SATURATION: 97 %

## 2021-09-22 DIAGNOSIS — S32.020D CLOSED COMPRESSION FRACTURE OF L2 LUMBAR VERTEBRA WITH ROUTINE HEALING, SUBSEQUENT ENCOUNTER: ICD-10-CM

## 2021-09-22 DIAGNOSIS — S32.020D CLOSED COMPRESSION FRACTURE OF L2 LUMBAR VERTEBRA WITH ROUTINE HEALING, SUBSEQUENT ENCOUNTER: Primary | ICD-10-CM

## 2021-09-22 PROCEDURE — 3060F POS MICROALBUMINURIA REV: CPT | Mod: CPTII,S$GLB,, | Performed by: INTERNAL MEDICINE

## 2021-09-22 PROCEDURE — 3044F HG A1C LEVEL LT 7.0%: CPT | Mod: CPTII,S$GLB,, | Performed by: INTERNAL MEDICINE

## 2021-09-22 PROCEDURE — 1125F AMNT PAIN NOTED PAIN PRSNT: CPT | Mod: CPTII,S$GLB,, | Performed by: INTERNAL MEDICINE

## 2021-09-22 PROCEDURE — 3078F DIAST BP <80 MM HG: CPT | Mod: CPTII,S$GLB,, | Performed by: INTERNAL MEDICINE

## 2021-09-22 PROCEDURE — 99214 PR OFFICE/OUTPT VISIT, EST, LEVL IV, 30-39 MIN: ICD-10-PCS | Mod: S$GLB,,, | Performed by: INTERNAL MEDICINE

## 2021-09-22 PROCEDURE — 1125F PR PAIN SEVERITY QUANTIFIED, PAIN PRESENT: ICD-10-PCS | Mod: CPTII,S$GLB,, | Performed by: INTERNAL MEDICINE

## 2021-09-22 PROCEDURE — 1100F PTFALLS ASSESS-DOCD GE2>/YR: CPT | Mod: CPTII,S$GLB,, | Performed by: INTERNAL MEDICINE

## 2021-09-22 PROCEDURE — 3008F PR BODY MASS INDEX (BMI) DOCUMENTED: ICD-10-PCS | Mod: CPTII,S$GLB,, | Performed by: INTERNAL MEDICINE

## 2021-09-22 PROCEDURE — 99214 OFFICE O/P EST MOD 30 MIN: CPT | Mod: S$GLB,,, | Performed by: INTERNAL MEDICINE

## 2021-09-22 PROCEDURE — 3060F PR POS MICROALBUMINURIA RESULT DOCUMENTED/REVIEW: ICD-10-PCS | Mod: CPTII,S$GLB,, | Performed by: INTERNAL MEDICINE

## 2021-09-22 PROCEDURE — 4010F ACE/ARB THERAPY RXD/TAKEN: CPT | Mod: CPTII,S$GLB,, | Performed by: INTERNAL MEDICINE

## 2021-09-22 PROCEDURE — 3066F NEPHROPATHY DOC TX: CPT | Mod: CPTII,S$GLB,, | Performed by: INTERNAL MEDICINE

## 2021-09-22 PROCEDURE — 3074F SYST BP LT 130 MM HG: CPT | Mod: CPTII,S$GLB,, | Performed by: INTERNAL MEDICINE

## 2021-09-22 PROCEDURE — 3008F BODY MASS INDEX DOCD: CPT | Mod: CPTII,S$GLB,, | Performed by: INTERNAL MEDICINE

## 2021-09-22 PROCEDURE — 3288F FALL RISK ASSESSMENT DOCD: CPT | Mod: CPTII,S$GLB,, | Performed by: INTERNAL MEDICINE

## 2021-09-22 PROCEDURE — 3078F PR MOST RECENT DIASTOLIC BLOOD PRESSURE < 80 MM HG: ICD-10-PCS | Mod: CPTII,S$GLB,, | Performed by: INTERNAL MEDICINE

## 2021-09-22 PROCEDURE — 3288F PR FALLS RISK ASSESSMENT DOCUMENTED: ICD-10-PCS | Mod: CPTII,S$GLB,, | Performed by: INTERNAL MEDICINE

## 2021-09-22 PROCEDURE — 3044F PR MOST RECENT HEMOGLOBIN A1C LEVEL <7.0%: ICD-10-PCS | Mod: CPTII,S$GLB,, | Performed by: INTERNAL MEDICINE

## 2021-09-22 PROCEDURE — 4010F PR ACE/ARB THEARPY RXD/TAKEN: ICD-10-PCS | Mod: CPTII,S$GLB,, | Performed by: INTERNAL MEDICINE

## 2021-09-22 PROCEDURE — 3066F PR DOCUMENTATION OF TREATMENT FOR NEPHROPATHY: ICD-10-PCS | Mod: CPTII,S$GLB,, | Performed by: INTERNAL MEDICINE

## 2021-09-22 PROCEDURE — 1100F PR PT FALLS ASSESS DOC 2+ FALLS/FALL W/INJURY/YR: ICD-10-PCS | Mod: CPTII,S$GLB,, | Performed by: INTERNAL MEDICINE

## 2021-09-22 PROCEDURE — 3074F PR MOST RECENT SYSTOLIC BLOOD PRESSURE < 130 MM HG: ICD-10-PCS | Mod: CPTII,S$GLB,, | Performed by: INTERNAL MEDICINE

## 2021-09-22 RX ORDER — HYDROCODONE BITARTRATE AND ACETAMINOPHEN 5; 325 MG/1; MG/1
TABLET ORAL
Qty: 21 TABLET | Refills: 0 | Status: SHIPPED | OUTPATIENT
Start: 2021-09-22 | End: 2021-10-08 | Stop reason: SDUPTHER

## 2021-09-22 RX ORDER — AMOXICILLIN 250 MG
2 CAPSULE ORAL 2 TIMES DAILY
Qty: 120 TABLET | Refills: 0 | COMMUNITY
Start: 2021-09-22 | End: 2022-11-04 | Stop reason: ALTCHOICE

## 2021-09-22 RX ORDER — HYDROCODONE BITARTRATE AND ACETAMINOPHEN 5; 325 MG/1; MG/1
TABLET ORAL
Qty: 90 TABLET | Refills: 0 | Status: SHIPPED | OUTPATIENT
Start: 2021-09-22 | End: 2021-09-22 | Stop reason: SDUPTHER

## 2021-09-23 ENCOUNTER — TELEPHONE (OUTPATIENT)
Dept: SPINE | Facility: CLINIC | Age: 71
End: 2021-09-23

## 2021-09-24 ENCOUNTER — TELEPHONE (OUTPATIENT)
Dept: SPINE | Facility: CLINIC | Age: 71
End: 2021-09-24

## 2021-10-06 ENCOUNTER — TELEPHONE (OUTPATIENT)
Dept: SPINE | Facility: CLINIC | Age: 71
End: 2021-10-06

## 2021-10-06 DIAGNOSIS — S32.020A COMPRESSION FRACTURE OF L2 VERTEBRA, INITIAL ENCOUNTER: Primary | ICD-10-CM

## 2021-10-06 DIAGNOSIS — S32.009A TRAUMATIC FRACTURE OF LUMBAR VERTEBRA: ICD-10-CM

## 2021-10-08 ENCOUNTER — TELEPHONE (OUTPATIENT)
Dept: FAMILY MEDICINE | Facility: CLINIC | Age: 71
End: 2021-10-08

## 2021-10-08 DIAGNOSIS — S32.020D CLOSED COMPRESSION FRACTURE OF L2 LUMBAR VERTEBRA WITH ROUTINE HEALING, SUBSEQUENT ENCOUNTER: ICD-10-CM

## 2021-10-08 RX ORDER — HYDROCODONE BITARTRATE AND ACETAMINOPHEN 5; 325 MG/1; MG/1
TABLET ORAL
Qty: 21 TABLET | Refills: 0 | Status: SHIPPED | OUTPATIENT
Start: 2021-10-08 | End: 2021-10-19 | Stop reason: SDUPTHER

## 2021-10-15 ENCOUNTER — TELEPHONE (OUTPATIENT)
Dept: PHYSICAL MEDICINE AND REHAB | Facility: CLINIC | Age: 71
End: 2021-10-15

## 2021-10-15 ENCOUNTER — HOSPITAL ENCOUNTER (OUTPATIENT)
Dept: RADIOLOGY | Facility: HOSPITAL | Age: 71
Discharge: HOME | End: 2021-10-15
Attending: PHYSICAL MEDICINE & REHABILITATION
Payer: MEDICARE

## 2021-10-15 DIAGNOSIS — S32.009A TRAUMATIC FRACTURE OF LUMBAR VERTEBRA: ICD-10-CM

## 2021-10-15 PROCEDURE — 72100 X-RAY EXAM L-S SPINE 2/3 VWS: CPT | Mod: TC,FY

## 2021-10-15 PROCEDURE — 72100 XR LUMBAR SPINE AP AND LATERAL: ICD-10-PCS | Mod: 26,,, | Performed by: RADIOLOGY

## 2021-10-15 PROCEDURE — 72100 X-RAY EXAM L-S SPINE 2/3 VWS: CPT | Mod: 26,,, | Performed by: RADIOLOGY

## 2021-10-18 ENCOUNTER — TELEPHONE (OUTPATIENT)
Dept: SPINE | Facility: CLINIC | Age: 71
End: 2021-10-18

## 2021-10-19 ENCOUNTER — OFFICE VISIT (OUTPATIENT)
Dept: FAMILY MEDICINE | Facility: CLINIC | Age: 71
End: 2021-10-19
Payer: MEDICARE

## 2021-10-19 ENCOUNTER — PATIENT MESSAGE (OUTPATIENT)
Dept: SPINE | Facility: CLINIC | Age: 71
End: 2021-10-19
Payer: MEDICARE

## 2021-10-19 VITALS
HEART RATE: 70 BPM | HEIGHT: 71 IN | BODY MASS INDEX: 28.02 KG/M2 | OXYGEN SATURATION: 95 % | SYSTOLIC BLOOD PRESSURE: 122 MMHG | RESPIRATION RATE: 18 BRPM | DIASTOLIC BLOOD PRESSURE: 64 MMHG | WEIGHT: 200.19 LBS | TEMPERATURE: 98 F

## 2021-10-19 DIAGNOSIS — M54.50 CHRONIC LOW BACK PAIN, UNSPECIFIED BACK PAIN LATERALITY, UNSPECIFIED WHETHER SCIATICA PRESENT: ICD-10-CM

## 2021-10-19 DIAGNOSIS — S32.020D CLOSED COMPRESSION FRACTURE OF L2 LUMBAR VERTEBRA WITH ROUTINE HEALING, SUBSEQUENT ENCOUNTER: Primary | ICD-10-CM

## 2021-10-19 DIAGNOSIS — E11.9 DIABETES MELLITUS WITHOUT COMPLICATION: ICD-10-CM

## 2021-10-19 DIAGNOSIS — G89.29 CHRONIC LOW BACK PAIN, UNSPECIFIED BACK PAIN LATERALITY, UNSPECIFIED WHETHER SCIATICA PRESENT: ICD-10-CM

## 2021-10-19 PROCEDURE — 3078F DIAST BP <80 MM HG: CPT | Mod: CPTII,S$GLB,, | Performed by: INTERNAL MEDICINE

## 2021-10-19 PROCEDURE — 1160F PR REVIEW ALL MEDS BY PRESCRIBER/CLIN PHARMACIST DOCUMENTED: ICD-10-PCS | Mod: CPTII,S$GLB,, | Performed by: INTERNAL MEDICINE

## 2021-10-19 PROCEDURE — 1101F PR PT FALLS ASSESS DOC 0-1 FALLS W/OUT INJ PAST YR: ICD-10-PCS | Mod: CPTII,S$GLB,, | Performed by: INTERNAL MEDICINE

## 2021-10-19 PROCEDURE — 1160F RVW MEDS BY RX/DR IN RCRD: CPT | Mod: CPTII,S$GLB,, | Performed by: INTERNAL MEDICINE

## 2021-10-19 PROCEDURE — 3061F NEG MICROALBUMINURIA REV: CPT | Mod: CPTII,S$GLB,, | Performed by: INTERNAL MEDICINE

## 2021-10-19 PROCEDURE — 1101F PT FALLS ASSESS-DOCD LE1/YR: CPT | Mod: CPTII,S$GLB,, | Performed by: INTERNAL MEDICINE

## 2021-10-19 PROCEDURE — 3008F BODY MASS INDEX DOCD: CPT | Mod: CPTII,S$GLB,, | Performed by: INTERNAL MEDICINE

## 2021-10-19 PROCEDURE — 1159F MED LIST DOCD IN RCRD: CPT | Mod: CPTII,S$GLB,, | Performed by: INTERNAL MEDICINE

## 2021-10-19 PROCEDURE — 3044F HG A1C LEVEL LT 7.0%: CPT | Mod: CPTII,S$GLB,, | Performed by: INTERNAL MEDICINE

## 2021-10-19 PROCEDURE — 3078F PR MOST RECENT DIASTOLIC BLOOD PRESSURE < 80 MM HG: ICD-10-PCS | Mod: CPTII,S$GLB,, | Performed by: INTERNAL MEDICINE

## 2021-10-19 PROCEDURE — 3074F SYST BP LT 130 MM HG: CPT | Mod: CPTII,S$GLB,, | Performed by: INTERNAL MEDICINE

## 2021-10-19 PROCEDURE — 3061F PR NEG MICROALBUMINURIA RESULT DOCUMENTED/REVIEW: ICD-10-PCS | Mod: CPTII,S$GLB,, | Performed by: INTERNAL MEDICINE

## 2021-10-19 PROCEDURE — 3066F PR DOCUMENTATION OF TREATMENT FOR NEPHROPATHY: ICD-10-PCS | Mod: CPTII,S$GLB,, | Performed by: INTERNAL MEDICINE

## 2021-10-19 PROCEDURE — 3288F PR FALLS RISK ASSESSMENT DOCUMENTED: ICD-10-PCS | Mod: CPTII,S$GLB,, | Performed by: INTERNAL MEDICINE

## 2021-10-19 PROCEDURE — 1125F PR PAIN SEVERITY QUANTIFIED, PAIN PRESENT: ICD-10-PCS | Mod: CPTII,S$GLB,, | Performed by: INTERNAL MEDICINE

## 2021-10-19 PROCEDURE — 3044F PR MOST RECENT HEMOGLOBIN A1C LEVEL <7.0%: ICD-10-PCS | Mod: CPTII,S$GLB,, | Performed by: INTERNAL MEDICINE

## 2021-10-19 PROCEDURE — 3008F PR BODY MASS INDEX (BMI) DOCUMENTED: ICD-10-PCS | Mod: CPTII,S$GLB,, | Performed by: INTERNAL MEDICINE

## 2021-10-19 PROCEDURE — 1159F PR MEDICATION LIST DOCUMENTED IN MEDICAL RECORD: ICD-10-PCS | Mod: CPTII,S$GLB,, | Performed by: INTERNAL MEDICINE

## 2021-10-19 PROCEDURE — 3288F FALL RISK ASSESSMENT DOCD: CPT | Mod: CPTII,S$GLB,, | Performed by: INTERNAL MEDICINE

## 2021-10-19 PROCEDURE — 99214 OFFICE O/P EST MOD 30 MIN: CPT | Mod: S$GLB,,, | Performed by: INTERNAL MEDICINE

## 2021-10-19 PROCEDURE — 99214 PR OFFICE/OUTPT VISIT, EST, LEVL IV, 30-39 MIN: ICD-10-PCS | Mod: S$GLB,,, | Performed by: INTERNAL MEDICINE

## 2021-10-19 PROCEDURE — 1125F AMNT PAIN NOTED PAIN PRSNT: CPT | Mod: CPTII,S$GLB,, | Performed by: INTERNAL MEDICINE

## 2021-10-19 PROCEDURE — 3066F NEPHROPATHY DOC TX: CPT | Mod: CPTII,S$GLB,, | Performed by: INTERNAL MEDICINE

## 2021-10-19 PROCEDURE — 3074F PR MOST RECENT SYSTOLIC BLOOD PRESSURE < 130 MM HG: ICD-10-PCS | Mod: CPTII,S$GLB,, | Performed by: INTERNAL MEDICINE

## 2021-10-19 PROCEDURE — 4010F PR ACE/ARB THEARPY RXD/TAKEN: ICD-10-PCS | Mod: CPTII,S$GLB,, | Performed by: INTERNAL MEDICINE

## 2021-10-19 PROCEDURE — 4010F ACE/ARB THERAPY RXD/TAKEN: CPT | Mod: CPTII,S$GLB,, | Performed by: INTERNAL MEDICINE

## 2021-10-19 RX ORDER — HYDROCODONE BITARTRATE AND ACETAMINOPHEN 5; 325 MG/1; MG/1
TABLET ORAL
Qty: 60 TABLET | Refills: 0 | Status: SHIPPED | OUTPATIENT
Start: 2021-10-19 | End: 2022-11-04 | Stop reason: ALTCHOICE

## 2022-01-12 ENCOUNTER — PES CALL (OUTPATIENT)
Dept: ADMINISTRATIVE | Facility: CLINIC | Age: 72
End: 2022-01-12
Payer: MEDICARE

## 2022-01-18 ENCOUNTER — PES CALL (OUTPATIENT)
Dept: ADMINISTRATIVE | Facility: CLINIC | Age: 72
End: 2022-01-18
Payer: MEDICARE

## 2022-01-31 ENCOUNTER — TELEPHONE (OUTPATIENT)
Dept: FAMILY MEDICINE | Facility: CLINIC | Age: 72
End: 2022-01-31
Payer: MEDICARE

## 2022-01-31 NOTE — TELEPHONE ENCOUNTER
----- Message from Evaristo Ramos sent at 1/31/2022  2:36 PM CST -----  Regarding: Call Back  Who Called: pt         What is the reason for the call: pt is calling in regards to him and his wife are still having issues and feeling sick after testing positive for covid in November. States they have no energy , coughing, and can still hardly taste or smell. Please contact to further discuss.          Can patient be contacted on [employer]: n/a         Call back number: [phone]

## 2022-02-01 NOTE — TELEPHONE ENCOUNTER
They need an appointment to make sure they do not have pneumonia and may need a referral to pt for long covid

## 2022-02-01 NOTE — TELEPHONE ENCOUNTER
Call placed to patient. No answer received. Left detailed message requesting return call to office to schedule follow up appointement.

## 2022-02-02 ENCOUNTER — TELEPHONE (OUTPATIENT)
Dept: FAMILY MEDICINE | Facility: CLINIC | Age: 72
End: 2022-02-02
Payer: MEDICARE

## 2022-02-07 DIAGNOSIS — N52.9 ERECTILE DYSFUNCTION, UNSPECIFIED ERECTILE DYSFUNCTION TYPE: ICD-10-CM

## 2022-02-07 RX ORDER — SILDENAFIL CITRATE 20 MG/1
TABLET ORAL
Qty: 30 TABLET | Refills: 2 | Status: SHIPPED | OUTPATIENT
Start: 2022-02-07 | End: 2022-04-18 | Stop reason: SDUPTHER

## 2022-03-14 ENCOUNTER — TELEPHONE (OUTPATIENT)
Dept: FAMILY MEDICINE | Facility: CLINIC | Age: 72
End: 2022-03-14
Payer: MEDICARE

## 2022-03-14 NOTE — TELEPHONE ENCOUNTER
Spoke with patient in regards to appt needing to be rescheduled, pt has been rescheduled for 4/18/22 at 1:40pm

## 2022-03-14 NOTE — TELEPHONE ENCOUNTER
Patient was requested to give the office a call back in regards to 4/19/22 appointment with  Sina Anguiano that is needed to be rescheduled due to provider being out of the clinic.   Patient was given the [facility] number [phone] to get appointment rescheduled or patient can reschedule in patient portal.

## 2022-04-11 ENCOUNTER — TELEPHONE (OUTPATIENT)
Dept: FAMILY MEDICINE | Facility: CLINIC | Age: 72
End: 2022-04-11
Payer: MEDICARE

## 2022-04-12 ENCOUNTER — LAB VISIT (OUTPATIENT)
Dept: LAB | Facility: HOSPITAL | Age: 72
End: 2022-04-12
Attending: FAMILY MEDICINE
Payer: MEDICARE

## 2022-04-12 DIAGNOSIS — E11.9 DIABETES MELLITUS WITHOUT COMPLICATION: ICD-10-CM

## 2022-04-12 LAB
ALBUMIN/CREAT UR: 50 UG/MG (ref 0–30)
CREAT UR-MCNC: 40 MG/DL (ref 23–375)
MICROALBUMIN UR DL<=1MG/L-MCNC: 20 UG/ML

## 2022-04-12 PROCEDURE — 82043 UR ALBUMIN QUANTITATIVE: CPT | Performed by: INTERNAL MEDICINE

## 2022-04-12 PROCEDURE — 82570 ASSAY OF URINE CREATININE: CPT | Performed by: INTERNAL MEDICINE

## 2022-04-18 ENCOUNTER — OFFICE VISIT (OUTPATIENT)
Dept: FAMILY MEDICINE | Facility: CLINIC | Age: 72
End: 2022-04-18
Payer: MEDICARE

## 2022-04-18 VITALS
TEMPERATURE: 98 F | HEART RATE: 64 BPM | BODY MASS INDEX: 28.31 KG/M2 | SYSTOLIC BLOOD PRESSURE: 136 MMHG | DIASTOLIC BLOOD PRESSURE: 76 MMHG | OXYGEN SATURATION: 96 % | WEIGHT: 202.19 LBS | HEIGHT: 71 IN

## 2022-04-18 DIAGNOSIS — E11.69 HYPERLIPIDEMIA ASSOCIATED WITH TYPE 2 DIABETES MELLITUS: ICD-10-CM

## 2022-04-18 DIAGNOSIS — R60.0 BILATERAL LEG EDEMA: ICD-10-CM

## 2022-04-18 DIAGNOSIS — N52.9 ERECTILE DYSFUNCTION, UNSPECIFIED ERECTILE DYSFUNCTION TYPE: ICD-10-CM

## 2022-04-18 DIAGNOSIS — E11.628 TYPE 2 DIABETES, CONTROLLED, WITH CELLULITIS OF FOOT: ICD-10-CM

## 2022-04-18 DIAGNOSIS — E78.5 HYPERLIPIDEMIA ASSOCIATED WITH TYPE 2 DIABETES MELLITUS: ICD-10-CM

## 2022-04-18 DIAGNOSIS — R60.0 BILATERAL LOWER EXTREMITY EDEMA: Primary | ICD-10-CM

## 2022-04-18 DIAGNOSIS — L03.119 TYPE 2 DIABETES, CONTROLLED, WITH CELLULITIS OF FOOT: ICD-10-CM

## 2022-04-18 PROCEDURE — 3078F DIAST BP <80 MM HG: CPT | Mod: CPTII,S$GLB,, | Performed by: FAMILY MEDICINE

## 2022-04-18 PROCEDURE — 1159F MED LIST DOCD IN RCRD: CPT | Mod: CPTII,S$GLB,, | Performed by: FAMILY MEDICINE

## 2022-04-18 PROCEDURE — 3060F PR POS MICROALBUMINURIA RESULT DOCUMENTED/REVIEW: ICD-10-PCS | Mod: CPTII,S$GLB,, | Performed by: FAMILY MEDICINE

## 2022-04-18 PROCEDURE — 3008F PR BODY MASS INDEX (BMI) DOCUMENTED: ICD-10-PCS | Mod: CPTII,S$GLB,, | Performed by: FAMILY MEDICINE

## 2022-04-18 PROCEDURE — 3066F PR DOCUMENTATION OF TREATMENT FOR NEPHROPATHY: ICD-10-PCS | Mod: CPTII,S$GLB,, | Performed by: FAMILY MEDICINE

## 2022-04-18 PROCEDURE — 99499 RISK ADDL DX/OHS AUDIT: ICD-10-PCS | Mod: S$GLB,,, | Performed by: FAMILY MEDICINE

## 2022-04-18 PROCEDURE — 1101F PR PT FALLS ASSESS DOC 0-1 FALLS W/OUT INJ PAST YR: ICD-10-PCS | Mod: CPTII,S$GLB,, | Performed by: FAMILY MEDICINE

## 2022-04-18 PROCEDURE — 3288F PR FALLS RISK ASSESSMENT DOCUMENTED: ICD-10-PCS | Mod: CPTII,S$GLB,, | Performed by: FAMILY MEDICINE

## 2022-04-18 PROCEDURE — 4010F ACE/ARB THERAPY RXD/TAKEN: CPT | Mod: CPTII,S$GLB,, | Performed by: FAMILY MEDICINE

## 2022-04-18 PROCEDURE — 1126F PR PAIN SEVERITY QUANTIFIED, NO PAIN PRESENT: ICD-10-PCS | Mod: CPTII,S$GLB,, | Performed by: FAMILY MEDICINE

## 2022-04-18 PROCEDURE — 99214 OFFICE O/P EST MOD 30 MIN: CPT | Mod: S$GLB,,, | Performed by: FAMILY MEDICINE

## 2022-04-18 PROCEDURE — 3078F PR MOST RECENT DIASTOLIC BLOOD PRESSURE < 80 MM HG: ICD-10-PCS | Mod: CPTII,S$GLB,, | Performed by: FAMILY MEDICINE

## 2022-04-18 PROCEDURE — 3075F SYST BP GE 130 - 139MM HG: CPT | Mod: CPTII,S$GLB,, | Performed by: FAMILY MEDICINE

## 2022-04-18 PROCEDURE — 3075F PR MOST RECENT SYSTOLIC BLOOD PRESS GE 130-139MM HG: ICD-10-PCS | Mod: CPTII,S$GLB,, | Performed by: FAMILY MEDICINE

## 2022-04-18 PROCEDURE — 3008F BODY MASS INDEX DOCD: CPT | Mod: CPTII,S$GLB,, | Performed by: FAMILY MEDICINE

## 2022-04-18 PROCEDURE — 3066F NEPHROPATHY DOC TX: CPT | Mod: CPTII,S$GLB,, | Performed by: FAMILY MEDICINE

## 2022-04-18 PROCEDURE — 1159F PR MEDICATION LIST DOCUMENTED IN MEDICAL RECORD: ICD-10-PCS | Mod: CPTII,S$GLB,, | Performed by: FAMILY MEDICINE

## 2022-04-18 PROCEDURE — 3044F PR MOST RECENT HEMOGLOBIN A1C LEVEL <7.0%: ICD-10-PCS | Mod: CPTII,S$GLB,, | Performed by: FAMILY MEDICINE

## 2022-04-18 PROCEDURE — 99214 PR OFFICE/OUTPT VISIT, EST, LEVL IV, 30-39 MIN: ICD-10-PCS | Mod: S$GLB,,, | Performed by: FAMILY MEDICINE

## 2022-04-18 PROCEDURE — 3288F FALL RISK ASSESSMENT DOCD: CPT | Mod: CPTII,S$GLB,, | Performed by: FAMILY MEDICINE

## 2022-04-18 PROCEDURE — 1101F PT FALLS ASSESS-DOCD LE1/YR: CPT | Mod: CPTII,S$GLB,, | Performed by: FAMILY MEDICINE

## 2022-04-18 PROCEDURE — 3044F HG A1C LEVEL LT 7.0%: CPT | Mod: CPTII,S$GLB,, | Performed by: FAMILY MEDICINE

## 2022-04-18 PROCEDURE — 1126F AMNT PAIN NOTED NONE PRSNT: CPT | Mod: CPTII,S$GLB,, | Performed by: FAMILY MEDICINE

## 2022-04-18 PROCEDURE — 3060F POS MICROALBUMINURIA REV: CPT | Mod: CPTII,S$GLB,, | Performed by: FAMILY MEDICINE

## 2022-04-18 PROCEDURE — 99499 UNLISTED E&M SERVICE: CPT | Mod: S$GLB,,, | Performed by: FAMILY MEDICINE

## 2022-04-18 PROCEDURE — 4010F PR ACE/ARB THEARPY RXD/TAKEN: ICD-10-PCS | Mod: CPTII,S$GLB,, | Performed by: FAMILY MEDICINE

## 2022-04-18 RX ORDER — SILDENAFIL CITRATE 20 MG/1
TABLET ORAL
Qty: 30 TABLET | Refills: 2 | Status: SHIPPED | OUTPATIENT
Start: 2022-04-18 | End: 2023-04-20 | Stop reason: SDUPTHER

## 2022-04-18 RX ORDER — ATORVASTATIN CALCIUM 20 MG/1
20 TABLET, FILM COATED ORAL DAILY
Qty: 90 TABLET | Refills: 3 | Status: SHIPPED | OUTPATIENT
Start: 2022-04-18 | End: 2023-04-20 | Stop reason: SDUPTHER

## 2022-04-18 RX ORDER — METFORMIN HYDROCHLORIDE 1000 MG/1
1000 TABLET ORAL 2 TIMES DAILY
Qty: 180 TABLET | Refills: 1 | Status: SHIPPED | OUTPATIENT
Start: 2022-04-18 | End: 2023-01-17

## 2022-04-18 RX ORDER — FUROSEMIDE 20 MG/1
40 TABLET ORAL DAILY PRN
Qty: 180 TABLET | Refills: 3 | Status: SHIPPED | OUTPATIENT
Start: 2022-04-18 | End: 2023-04-20 | Stop reason: SDUPTHER

## 2022-04-18 NOTE — PROGRESS NOTES
Subjective:       Patient ID: Umang Goldberg is a 72 y.o. male.    Chief Complaint: Establish Care, Hypertension, Foot Pain (Taking his medication as directed w/o any problems or concerns ), Diabetes (Taking his medication as directed w/o any problems or concerns ), COPD (Stable at present time), Edema (Deon feet and legs), Hyperlipidemia (Taking his medication as directed w/o any problems or concerns ), and Atrial Fibrillation (Pt seeing cardiologist at [facility])      Patient is a 72 year old male with history of  depression, COPD, paroxysmal atrial fibrillation, hypertension, dyslipidemia, erectile dysfunction, type 2 diabetes mellitus, and chronic anti coagulant use presenting for routine health maintenance and medication refill, patient endorses compliance with medicati regimen and denies any acute symptoms today. Had an L2 vertebral body fracture in 2021 and he has been more sedentary since and he has been having increased lower extremity swelling since then. He states that he think's it is because he has been sitting around a lot due to back pain.           Current Outpatient Medications:     apixaban (ELIQUIS) 5 mg Tab, Take 1 tablet (5 mg total) by mouth 2 (two) times daily., Disp: 180 tablet, Rfl: 2    blood sugar diagnostic Strp, Test blood sugar once daily with true matrix, Disp: 100 strip, Rfl: 3    doxycycline (VIBRAMYCIN) 100 MG Cap, Take 1 capsule (100 mg total) by mouth every 12 (twelve) hours., Disp: 20 capsule, Rfl: 0    lancets Misc, Test blood sugar once daily with insurance preferred brand., Disp: 100 each, Rfl: 3    lisinopriL (PRINIVIL,ZESTRIL) 20 MG tablet, Take 1 tablet (20 mg total) by mouth once daily., Disp: 90 tablet, Rfl: 3    metoprolol succinate (TOPROL-XL) 25 MG 24 hr tablet, Take 1 tablet (25 mg total) by mouth once daily., Disp: 90 tablet, Rfl: 3    propafenone (RHTHYMOL) 150 MG Tab, Take 1 tablet (150 mg total) by mouth 2 (two) times daily., Disp: 180  tablet, Rfl: 3    albuterol (PROVENTIL/VENTOLIN HFA) 90 mcg/actuation inhaler, Inhale 2 puffs into the lungs every 6 (six) hours as needed for Wheezing. (Patient not taking: No sig reported), Disp: 18 g, Rfl: 1    atorvastatin (LIPITOR) 20 MG tablet, Take 1 tablet (20 mg total) by mouth once daily., Disp: 90 tablet, Rfl: 3    blood-glucose meter kit, True matrix, use as directed, Disp: 1 each, Rfl: 0    furosemide (LASIX) 20 MG tablet, Take 2 tablets (40 mg total) by mouth daily as needed (bilateral edema)., Disp: 180 tablet, Rfl: 3    HYDROcodone-acetaminophen (NORCO) 5-325 mg per tablet, 1/2 PO q.4 hours prn the the (Patient not taking: Reported on 4/18/2022), Disp: 60 tablet, Rfl: 0    ipratropium (ATROVENT) 0.06 % nasal spray, 2 SPRAYS BY NASAL ROUTE 4 (FOUR) TIMES DAILY. (Patient not taking: No sig reported), Disp: 15 mL, Rfl: 3    lancing device Misc, 1 Device by Misc.(Non-Drug; Combo Route) route 2 (two) times daily with meals., Disp: 1 each, Rfl: 0    magnesium oxide (MAG-OX) 400 mg (241.3 mg magnesium) tablet, Take 1 tablet (400 mg total) by mouth once daily. (Patient not taking: No sig reported), Disp: , Rfl: 0    magnesium oxide 400 mg magnesium Tab, Take 1 tablet by mouth once daily., Disp: , Rfl:     metFORMIN (GLUCOPHAGE) 1000 MG tablet, Take 1 tablet (1,000 mg total) by mouth 2 (two) times daily., Disp: 180 tablet, Rfl: 1    pregabalin (LYRICA) 75 MG capsule, Take 1 capsule (75 mg total) by mouth 2 (two) times daily. (Patient not taking: Reported on 9/17/2021), Disp: 60 capsule, Rfl: 0    senna-docusate 8.6-50 mg (SENNA WITH DOCUSATE SODIUM) 8.6-50 mg per tablet, Take 2 tablets by mouth 2 (two) times daily. (Patient not taking: Reported on 4/18/2022), Disp: 120 tablet, Rfl: 0    sildenafil (REVATIO) 20 mg Tab, TAKE THREE TABLETS BY MOUTH ONCE DAILY AS NEEDED, Disp: 30 tablet, Rfl: 2    Review of patient's allergies indicates:   Allergen Reactions    Contrast media        Past Medical  History:   Diagnosis Date    A-fib 1 year    Diabetes mellitus     Hypertension        Past Surgical History:   Procedure Laterality Date    BACK SURGERY      EYE SURGERY      HERNIA REPAIR         Family History   Problem Relation Age of Onset    Skin cancer Father     Psoriasis Daughter     Glaucoma Neg Hx     Macular degeneration Neg Hx     Retinal detachment Neg Hx     Melanoma Neg Hx     Lupus Neg Hx     Eczema Neg Hx        Social History     Tobacco Use    Smoking status: Current Every Day Smoker     Packs/day: 0.50     Types: Vaping with nicotine, Cigarettes    Smokeless tobacco: Never Used   Substance Use Topics    Alcohol use: Yes     Alcohol/week: 4.0 - 6.0 standard drinks     Types: 4 - 6 Cans of beer per week    Drug use: Yes     Types: Hydrocodone       Review of Systems   Constitutional: Negative for activity change, appetite change, chills, diaphoresis, fatigue, fever and unexpected weight change.   HENT: Negative for hearing loss, postnasal drip, rhinorrhea, sinus pressure/congestion, sore throat and trouble swallowing.    Eyes: Negative for visual disturbance.   Respiratory: Negative for apnea, chest tightness, shortness of breath and wheezing.    Cardiovascular: Negative for chest pain.   Gastrointestinal: Negative for abdominal pain, blood in stool, change in bowel habit, constipation, diarrhea, nausea, vomiting and change in bowel habit.   Endocrine: Negative for polydipsia and polyphagia.   Genitourinary: Negative for dysuria, enuresis, flank pain, frequency and urgency.   Musculoskeletal: Positive for back pain.   Integumentary:  Negative for rash and mole/lesion.   Neurological: Negative for dizziness, light-headedness, headaches and memory loss.   Psychiatric/Behavioral: Negative for confusion, decreased concentration, sleep disturbance and suicidal ideas. The patient is not nervous/anxious.            Objective:      Vitals:    04/18/22 1340   BP: 136/76   BP Location:  "Right arm   Patient Position: Sitting   BP Method: Medium (Manual)   Pulse: 64   Temp: 98.4 °F (36.9 °C)   TempSrc: Temporal   SpO2: 96%   Weight: 91.7 kg (202 lb 3.2 oz)   Height: 5' 11" (1.803 m)     Physical Exam  Constitutional:       General: He is not in acute distress.     Appearance: He is obese. He is not ill-appearing, toxic-appearing or diaphoretic.   HENT:      Head: Normocephalic and atraumatic.      Nose: Nose normal. No congestion or rhinorrhea.      Mouth/Throat:      Mouth: Mucous membranes are moist.      Pharynx: No oropharyngeal exudate or posterior oropharyngeal erythema.   Eyes:      General: No scleral icterus.        Right eye: No discharge.         Left eye: No discharge.      Conjunctiva/sclera: Conjunctivae normal.      Pupils: Pupils are equal, round, and reactive to light.   Cardiovascular:      Rate and Rhythm: Normal rate and regular rhythm.      Pulses: Normal pulses.      Heart sounds: Normal heart sounds. No murmur heard.    No friction rub. No gallop.   Pulmonary:      Effort: Pulmonary effort is normal. No respiratory distress.      Breath sounds: Normal breath sounds. No stridor. No wheezing, rhonchi or rales.   Chest:      Chest wall: No tenderness.   Abdominal:      General: Abdomen is flat. Bowel sounds are normal. There is no distension.      Palpations: Abdomen is soft. There is no mass.      Tenderness: There is no abdominal tenderness. There is no guarding or rebound.      Hernia: No hernia is present.   Musculoskeletal:      Cervical back: No rigidity or tenderness.   Neurological:      Mental Status: He is alert.           Assessment:       1. Bilateral lower extremity edema    2. Erectile dysfunction, unspecified erectile dysfunction type    3. Type 2 diabetes, controlled, with cellulitis of foot    4. Hyperlipidemia associated with type 2 diabetes mellitus    5. Bilateral leg edema        Plan:       No problem-specific Assessment & Plan notes found for this " encounter.      ORDERS:    Bilateral lower extremity edema  -     Ambulatory referral/consult to Cardiology; Future; Expected date: 04/25/2022    Erectile dysfunction, unspecified erectile dysfunction type  -     sildenafil (REVATIO) 20 mg Tab; TAKE THREE TABLETS BY MOUTH ONCE DAILY AS NEEDED  Dispense: 30 tablet; Refill: 2    Type 2 diabetes, controlled, with cellulitis of foot  -     metFORMIN (GLUCOPHAGE) 1000 MG tablet; Take 1 tablet (1,000 mg total) by mouth 2 (two) times daily.  Dispense: 180 tablet; Refill: 1    Hyperlipidemia associated with type 2 diabetes mellitus  -     atorvastatin (LIPITOR) 20 MG tablet; Take 1 tablet (20 mg total) by mouth once daily.  Dispense: 90 tablet; Refill: 3    Bilateral leg edema  -     furosemide (LASIX) 20 MG tablet; Take 2 tablets (40 mg total) by mouth daily as needed (bilateral edema).  Dispense: 180 tablet; Refill: 3        Follow up in about 3 months (around 7/18/2022).    Melanie Nuno MD         DISCLAIMER: This note was prepared with [employer] Naturally Speaking voice recognition transcription software. Garbled syntax, mangled pronouns, and other bizarre constructions may be attributed to that software system.

## 2022-04-18 NOTE — PATIENT INSTRUCTIONS
Vignesh Heck,     If you are due for any health screening(s) below please notify me so we can arrange them to be ordered and scheduled to maintain your health. Most healthy patients complete it. Don't lose out on improving your health.     Health Maintenance   Topic Date Due    Foot Exam  09/25/2021    Eye Exam  08/04/2022    Hemoglobin A1c  10/12/2022    Low Dose Statin  10/19/2022    Lipid Panel  04/12/2023    TETANUS VACCINE  05/16/2026    Hepatitis C Screening  Completed    Abdominal Aortic Aneurysm Screening  Completed

## 2022-05-12 ENCOUNTER — PATIENT MESSAGE (OUTPATIENT)
Dept: SMOKING CESSATION | Facility: CLINIC | Age: 72
End: 2022-05-12
Payer: MEDICARE

## 2022-06-28 ENCOUNTER — OFFICE VISIT (OUTPATIENT)
Dept: CARDIOLOGY | Facility: CLINIC | Age: 72
End: 2022-06-28
Payer: MEDICARE

## 2022-06-28 VITALS
BODY MASS INDEX: 27.9 KG/M2 | HEIGHT: 71 IN | WEIGHT: 199.31 LBS | DIASTOLIC BLOOD PRESSURE: 75 MMHG | HEART RATE: 73 BPM | SYSTOLIC BLOOD PRESSURE: 148 MMHG

## 2022-06-28 DIAGNOSIS — E78.5 DYSLIPIDEMIA (HIGH LDL; LOW HDL): ICD-10-CM

## 2022-06-28 DIAGNOSIS — R60.0 BILATERAL LOWER EXTREMITY EDEMA: ICD-10-CM

## 2022-06-28 DIAGNOSIS — I48.0 PAROXYSMAL ATRIAL FIBRILLATION: Primary | ICD-10-CM

## 2022-06-28 DIAGNOSIS — E78.1 HYPERTRIGLYCERIDEMIA: ICD-10-CM

## 2022-06-28 DIAGNOSIS — Z72.0 TOBACCO ABUSE: ICD-10-CM

## 2022-06-28 DIAGNOSIS — E11.59 TYPE 2 DIABETES MELLITUS WITH OTHER CIRCULATORY COMPLICATIONS: ICD-10-CM

## 2022-06-28 DIAGNOSIS — Z77.090 ASBESTOS EXPOSURE: ICD-10-CM

## 2022-06-28 DIAGNOSIS — Z79.899 LONG TERM CURRENT USE OF ANTIARRHYTHMIC DRUG: ICD-10-CM

## 2022-06-28 DIAGNOSIS — I10 ESSENTIAL HYPERTENSION: ICD-10-CM

## 2022-06-28 PROCEDURE — 4010F PR ACE/ARB THEARPY RXD/TAKEN: ICD-10-PCS | Mod: CPTII,S$GLB,, | Performed by: INTERNAL MEDICINE

## 2022-06-28 PROCEDURE — 99499 UNLISTED E&M SERVICE: CPT | Mod: S$GLB,,, | Performed by: INTERNAL MEDICINE

## 2022-06-28 PROCEDURE — 3078F DIAST BP <80 MM HG: CPT | Mod: CPTII,S$GLB,, | Performed by: INTERNAL MEDICINE

## 2022-06-28 PROCEDURE — 3060F POS MICROALBUMINURIA REV: CPT | Mod: CPTII,S$GLB,, | Performed by: INTERNAL MEDICINE

## 2022-06-28 PROCEDURE — 1159F MED LIST DOCD IN RCRD: CPT | Mod: CPTII,S$GLB,, | Performed by: INTERNAL MEDICINE

## 2022-06-28 PROCEDURE — 99999 PR PBB SHADOW E&M-EST. PATIENT-LVL III: ICD-10-PCS | Mod: PBBFAC,,, | Performed by: INTERNAL MEDICINE

## 2022-06-28 PROCEDURE — 3078F PR MOST RECENT DIASTOLIC BLOOD PRESSURE < 80 MM HG: ICD-10-PCS | Mod: CPTII,S$GLB,, | Performed by: INTERNAL MEDICINE

## 2022-06-28 PROCEDURE — 99214 PR OFFICE/OUTPT VISIT, EST, LEVL IV, 30-39 MIN: ICD-10-PCS | Mod: S$GLB,,, | Performed by: INTERNAL MEDICINE

## 2022-06-28 PROCEDURE — 3060F PR POS MICROALBUMINURIA RESULT DOCUMENTED/REVIEW: ICD-10-PCS | Mod: CPTII,S$GLB,, | Performed by: INTERNAL MEDICINE

## 2022-06-28 PROCEDURE — 3077F PR MOST RECENT SYSTOLIC BLOOD PRESSURE >= 140 MM HG: ICD-10-PCS | Mod: CPTII,S$GLB,, | Performed by: INTERNAL MEDICINE

## 2022-06-28 PROCEDURE — 99214 OFFICE O/P EST MOD 30 MIN: CPT | Mod: S$GLB,,, | Performed by: INTERNAL MEDICINE

## 2022-06-28 PROCEDURE — 3008F BODY MASS INDEX DOCD: CPT | Mod: CPTII,S$GLB,, | Performed by: INTERNAL MEDICINE

## 2022-06-28 PROCEDURE — 3077F SYST BP >= 140 MM HG: CPT | Mod: CPTII,S$GLB,, | Performed by: INTERNAL MEDICINE

## 2022-06-28 PROCEDURE — 1159F PR MEDICATION LIST DOCUMENTED IN MEDICAL RECORD: ICD-10-PCS | Mod: CPTII,S$GLB,, | Performed by: INTERNAL MEDICINE

## 2022-06-28 PROCEDURE — 3044F PR MOST RECENT HEMOGLOBIN A1C LEVEL <7.0%: ICD-10-PCS | Mod: CPTII,S$GLB,, | Performed by: INTERNAL MEDICINE

## 2022-06-28 PROCEDURE — 1160F RVW MEDS BY RX/DR IN RCRD: CPT | Mod: CPTII,S$GLB,, | Performed by: INTERNAL MEDICINE

## 2022-06-28 PROCEDURE — 1126F AMNT PAIN NOTED NONE PRSNT: CPT | Mod: CPTII,S$GLB,, | Performed by: INTERNAL MEDICINE

## 2022-06-28 PROCEDURE — 1160F PR REVIEW ALL MEDS BY PRESCRIBER/CLIN PHARMACIST DOCUMENTED: ICD-10-PCS | Mod: CPTII,S$GLB,, | Performed by: INTERNAL MEDICINE

## 2022-06-28 PROCEDURE — 99499 RISK ADDL DX/OHS AUDIT: ICD-10-PCS | Mod: S$GLB,,, | Performed by: INTERNAL MEDICINE

## 2022-06-28 PROCEDURE — 4010F ACE/ARB THERAPY RXD/TAKEN: CPT | Mod: CPTII,S$GLB,, | Performed by: INTERNAL MEDICINE

## 2022-06-28 PROCEDURE — 3066F NEPHROPATHY DOC TX: CPT | Mod: CPTII,S$GLB,, | Performed by: INTERNAL MEDICINE

## 2022-06-28 PROCEDURE — 3066F PR DOCUMENTATION OF TREATMENT FOR NEPHROPATHY: ICD-10-PCS | Mod: CPTII,S$GLB,, | Performed by: INTERNAL MEDICINE

## 2022-06-28 PROCEDURE — 3044F HG A1C LEVEL LT 7.0%: CPT | Mod: CPTII,S$GLB,, | Performed by: INTERNAL MEDICINE

## 2022-06-28 PROCEDURE — 3008F PR BODY MASS INDEX (BMI) DOCUMENTED: ICD-10-PCS | Mod: CPTII,S$GLB,, | Performed by: INTERNAL MEDICINE

## 2022-06-28 PROCEDURE — 1126F PR PAIN SEVERITY QUANTIFIED, NO PAIN PRESENT: ICD-10-PCS | Mod: CPTII,S$GLB,, | Performed by: INTERNAL MEDICINE

## 2022-06-28 PROCEDURE — 99999 PR PBB SHADOW E&M-EST. PATIENT-LVL III: CPT | Mod: PBBFAC,,, | Performed by: INTERNAL MEDICINE

## 2022-06-28 NOTE — PROGRESS NOTES
Subjective:    Patient ID:  Umang Goldberg is a 72 y.o. male patient here for evaluation No chief complaint on file.      History of Present Illness:  New patient cardiac evaluation.  History of paroxysmal atrial fibrillation diagnosed several years ago undergoing noninvasive cardiac risk assessment for ischemic heart disease.  Stresses that time was negative.  Last Holter monitor and echo greater than 1 year ago.  Patient is maintained on Eliquis 5 b.i.d. as well as propafenone 150 b.i.d..  Stable STAPLETON.  No angina.  No syncope/presyncope.    Risk factors positive for ongoing tobacco use, diabetes mellitus, hypertension, dyslipidemia ,family history.    History of COPD.  No history of CVA Ca.  No chronic kidney disease.  No hepatic disease.  No history of GI bleed blood transfusion.    Recent lower extremity edema responding to regular doses of Lasix, 20 mg b.i.d..             Review of patient's allergies indicates:   Allergen Reactions    Contrast media        Past Medical History:   Diagnosis Date    A-fib 1 year    Diabetes mellitus     Hypertension      Past Surgical History:   Procedure Laterality Date    BACK SURGERY      EYE SURGERY      HERNIA REPAIR       Social History     Tobacco Use    Smoking status: Current Every Day Smoker     Packs/day: 0.50     Types: Vaping with nicotine, Cigarettes    Smokeless tobacco: Never Used   Substance Use Topics    Alcohol use: Yes     Alcohol/week: 4.0 - 6.0 standard drinks     Types: 4 - 6 Cans of beer per week    Drug use: Yes     Types: Hydrocodone        Review of Systems:    As noted in HPI in addition         REVIEW OF SYSTEMS  Review of Systems   Constitutional: Negative for decreased appetite, diaphoresis, night sweats, weight gain and weight loss.   HENT: Negative for nosebleeds and odynophagia.    Eyes: Negative for double vision and photophobia.   Cardiovascular: Positive for dyspnea on exertion and irregular heartbeat. Negative for chest pain,  claudication, cyanosis, leg swelling, near-syncope, orthopnea, palpitations, paroxysmal nocturnal dyspnea and syncope.   Respiratory: Negative for cough, hemoptysis, shortness of breath and wheezing.    Hematologic/Lymphatic: Negative for adenopathy.   Skin: Negative for flushing, skin cancer and suspicious lesions.   Musculoskeletal: Negative for gout, myalgias and neck pain.   Gastrointestinal: Negative for abdominal pain, heartburn, hematemesis and hematochezia.   Genitourinary: Negative for bladder incontinence, hesitancy and nocturia.   Neurological: Negative for focal weakness, headaches, light-headedness and paresthesias.   Psychiatric/Behavioral: Negative for memory loss and substance abuse.       Objective:        Vitals:    06/28/22 1402   BP: (!) 148/75   Pulse: 73       Lab Results   Component Value Date    WBC 5.99 04/12/2022    HGB 13.6 (L) 04/12/2022    HCT 42.3 04/12/2022     04/12/2022    CHOL 121 04/12/2022    TRIG 161 (H) 04/12/2022    HDL 32 (L) 04/12/2022    ALT 10 04/12/2022    AST 12 04/12/2022     04/12/2022    K 4.6 04/12/2022     04/12/2022    CREATININE 1.0 04/12/2022    BUN 14 04/12/2022    CO2 26 04/12/2022    TSH 2.077 12/17/2018    PSA 3.2 10/15/2021    HGBA1C 5.5 04/12/2022      CARDIOGRAM RESULTS  No results found for this or any previous visit.        CURRENT/PREVIOUS VISIT EKG  Results for orders placed or performed in visit on 07/10/19   EKG 12-lead    Collection Time: 07/10/19 12:17 PM    Narrative    Test Reason : I48.0,    Vent. Rate : 060 BPM     Atrial Rate : 060 BPM     P-R Int : 212 ms          QRS Dur : 084 ms      QT Int : 374 ms       P-R-T Axes : 067 074 073 degrees     QTc Int : 374 ms    Sinus rhythm with 1st degree A-V block  Otherwise normal ECG  When compared with ECG of 10-AUG-2018 07:32,  Criteria for Septal infarct are no longer Present  Confirmed by Apollo Zaragoza MD ([id number]) on 7/10/2019 5:27:32 PM    Referred By:             Confirmed By:Apollo Zaragoza  MD     No valid procedures specified.   No results found for this or any previous visit.    No valid procedures specified.    PHYSICAL EXAM  CONSTITUTIONAL: Well built, well nourished in no apparent distress  NECK: no carotid bruit, no JVD  LUNGS: CTA  CHEST WALL: no tenderness,  HEART: regular rate and rhythm, S1, S2 distant l, 1/6 systolic murmur lower left sternal border., click, rub or gallop   ABDOMEN: soft, non-tender; bowel sounds normal; no masses,  no organomegaly  EXTREMITIES: Extremities normal, no edema, no calf tenderness noted  VASCULAR EXAM: 2 PLUS UPPER AND LOWER EXT PULSES  NEURO: AAO X 3, NO ACUTE FOCAL OR LATERALIZING FINDINGS    I HAVE REVIEWED :    The vital signs, nurses notes, and all the pertinent radiology and labs.         Current Outpatient Medications   Medication Instructions    albuterol (PROVENTIL/VENTOLIN HFA) 90 mcg/actuation inhaler 2 puffs, Inhalation, Every 6 hours PRN    apixaban (ELIQUIS) 5 mg, Oral, 2 times daily    atorvastatin (LIPITOR) 20 mg, Oral, Daily    blood sugar diagnostic Strp Test blood sugar once daily with true matrix    blood-glucose meter kit True matrix, use as directed    doxycycline (VIBRAMYCIN) 100 mg, Oral, Every 12 hours    furosemide (LASIX) 40 mg, Oral, Daily PRN    HYDROcodone-acetaminophen (NORCO) 5-325 mg per tablet 1/2 PO q.4 hours prn the the    ipratropium (ATROVENT) 0.06 % nasal spray 2 sprays, Nasal, 4 times daily    lancets Misc Test blood sugar once daily with insurance preferred brand.    lancing device Misc 1 Device, Misc.(Non-Drug; Combo Route), 2 times daily with meals    lisinopriL (PRINIVIL,ZESTRIL) 20 mg, Oral, Daily    magnesium oxide (MAG-OX) 400 mg, Oral, Daily    magnesium oxide 400 mg magnesium Tab 1 tablet, Oral, Daily    metFORMIN (GLUCOPHAGE) 1,000 mg, Oral, 2 times daily    metoprolol succinate (TOPROL-XL) 25 mg, Oral, Daily    pregabalin (LYRICA) 75 mg, Oral, 2 times daily    propafenone (RHTHYMOL) 150 mg,  Oral, 2 times daily    senna-docusate 8.6-50 mg (SENNA WITH DOCUSATE SODIUM) 8.6-50 mg per tablet 2 tablets, Oral, 2 times daily    sildenafil (REVATIO) 20 mg Tab TAKE THREE TABLETS BY MOUTH ONCE DAILY AS NEEDED          Assessment:   Paroxysmal atrial fibrillation, maintained on apixaban  5 mg b.i.d. and propafenone 150 b.i.d..  Risk factors positive for hypertension, diabetes mellitus, dyslipidemia, or tobacco use.  History of COPD.  Recent complaints of leg swelling responding well to low-dose Lasix.  Recent lab stable.      Plan:   Echo.  48 hour Holter monitor.  Return to clinic results.  Consider Lexiscan.          No follow-ups on file.

## 2022-07-26 ENCOUNTER — CLINICAL SUPPORT (OUTPATIENT)
Dept: CARDIOLOGY | Facility: HOSPITAL | Age: 72
End: 2022-07-26
Attending: INTERNAL MEDICINE
Payer: MEDICARE

## 2022-07-26 DIAGNOSIS — I10 ESSENTIAL HYPERTENSION: ICD-10-CM

## 2022-07-26 DIAGNOSIS — E78.5 DYSLIPIDEMIA (HIGH LDL; LOW HDL): ICD-10-CM

## 2022-07-26 DIAGNOSIS — Z77.090 ASBESTOS EXPOSURE: ICD-10-CM

## 2022-07-26 DIAGNOSIS — E78.1 HYPERTRIGLYCERIDEMIA: ICD-10-CM

## 2022-07-26 DIAGNOSIS — E11.59 TYPE 2 DIABETES MELLITUS WITH OTHER CIRCULATORY COMPLICATIONS: ICD-10-CM

## 2022-07-26 DIAGNOSIS — I48.0 PAROXYSMAL ATRIAL FIBRILLATION: ICD-10-CM

## 2022-07-26 DIAGNOSIS — R60.0 BILATERAL LOWER EXTREMITY EDEMA: ICD-10-CM

## 2022-07-26 DIAGNOSIS — Z79.899 LONG TERM CURRENT USE OF ANTIARRHYTHMIC DRUG: ICD-10-CM

## 2022-07-26 PROCEDURE — 93227 XTRNL ECG REC<48 HR R&I: CPT | Mod: ,,, | Performed by: INTERNAL MEDICINE

## 2022-07-26 PROCEDURE — 93227 HOLTER MONITOR - 48 HOUR (CUPID ONLY): ICD-10-PCS | Mod: ,,, | Performed by: INTERNAL MEDICINE

## 2022-07-26 PROCEDURE — 93226 XTRNL ECG REC<48 HR SCAN A/R: CPT | Mod: PO

## 2022-07-27 ENCOUNTER — TELEPHONE (OUTPATIENT)
Dept: CARDIOLOGY | Facility: CLINIC | Age: 72
End: 2022-07-27
Payer: MEDICARE

## 2022-07-28 ENCOUNTER — CLINICAL SUPPORT (OUTPATIENT)
Dept: CARDIOLOGY | Facility: HOSPITAL | Age: 72
End: 2022-07-28
Attending: INTERNAL MEDICINE
Payer: MEDICARE

## 2022-07-28 VITALS — BODY MASS INDEX: 27.86 KG/M2 | HEIGHT: 71 IN | WEIGHT: 199 LBS

## 2022-07-28 DIAGNOSIS — Z77.090 ASBESTOS EXPOSURE: ICD-10-CM

## 2022-07-28 DIAGNOSIS — I48.0 PAROXYSMAL ATRIAL FIBRILLATION: ICD-10-CM

## 2022-07-28 DIAGNOSIS — I10 ESSENTIAL HYPERTENSION: ICD-10-CM

## 2022-07-28 DIAGNOSIS — E11.59 TYPE 2 DIABETES MELLITUS WITH OTHER CIRCULATORY COMPLICATIONS: ICD-10-CM

## 2022-07-28 DIAGNOSIS — E78.5 DYSLIPIDEMIA (HIGH LDL; LOW HDL): ICD-10-CM

## 2022-07-28 DIAGNOSIS — E78.1 HYPERTRIGLYCERIDEMIA: ICD-10-CM

## 2022-07-28 DIAGNOSIS — R60.0 BILATERAL LOWER EXTREMITY EDEMA: ICD-10-CM

## 2022-07-28 DIAGNOSIS — Z79.899 LONG TERM CURRENT USE OF ANTIARRHYTHMIC DRUG: ICD-10-CM

## 2022-07-28 PROCEDURE — 93306 TTE W/DOPPLER COMPLETE: CPT | Mod: PO

## 2022-07-28 PROCEDURE — 93306 TTE W/DOPPLER COMPLETE: CPT | Mod: 26,,, | Performed by: INTERNAL MEDICINE

## 2022-07-28 PROCEDURE — 93306 ECHO (CUPID ONLY): ICD-10-PCS | Mod: 26,,, | Performed by: INTERNAL MEDICINE

## 2022-08-01 LAB
ASCENDING AORTA: 3.3 CM
AV INDEX (PROSTH): 0.58
AV MEAN GRADIENT: 7 MMHG
AV PEAK GRADIENT: 13 MMHG
AV VALVE AREA: 1.99 CM2
AV VELOCITY RATIO: 0.54
BSA FOR ECHO PROCEDURE: 2.13 M2
CV ECHO LV RWT: 0.55 CM
DOP CALC AO PEAK VEL: 1.81 M/S
DOP CALC AO VTI: 41.47 CM
DOP CALC LVOT AREA: 3.5 CM2
DOP CALC LVOT DIAMETER: 2.1 CM
DOP CALC LVOT PEAK VEL: 0.98 M/S
DOP CALC LVOT STROKE VOLUME: 82.7 CM3
DOP CALCLVOT PEAK VEL VTI: 23.89 CM
E WAVE DECELERATION TIME: 335.43 MSEC
E/A RATIO: 0.79
E/E' RATIO: 7.25 M/S
ECHO LV POSTERIOR WALL: 1.09 CM (ref 0.6–1.1)
EJECTION FRACTION: 55 %
FRACTIONAL SHORTENING: 41 % (ref 28–44)
INTERVENTRICULAR SEPTUM: 1.24 CM (ref 0.6–1.1)
IVRT: 134.16 MSEC
LA MAJOR: 4.74 CM
LA MINOR: 4.78 CM
LA WIDTH: 3.29 CM
LEFT ATRIUM SIZE: 3.4 CM
LEFT ATRIUM VOLUME INDEX: 21.6 ML/M2
LEFT ATRIUM VOLUME: 45.26 CM3
LEFT INTERNAL DIMENSION IN SYSTOLE: 2.31 CM (ref 2.1–4)
LEFT VENTRICLE DIASTOLIC VOLUME INDEX: 32 ML/M2
LEFT VENTRICLE DIASTOLIC VOLUME: 67.2 ML
LEFT VENTRICLE MASS INDEX: 73 G/M2
LEFT VENTRICLE SYSTOLIC VOLUME INDEX: 8.7 ML/M2
LEFT VENTRICLE SYSTOLIC VOLUME: 18.36 ML
LEFT VENTRICULAR INTERNAL DIMENSION IN DIASTOLE: 3.93 CM (ref 3.5–6)
LEFT VENTRICULAR MASS: 154.2 G
LV LATERAL E/E' RATIO: 6.44 M/S
LV SEPTAL E/E' RATIO: 8.29 M/S
MV A" WAVE DURATION": 13.7 MSEC
MV PEAK A VEL: 0.73 M/S
MV PEAK E VEL: 0.58 M/S
OHS CV EVENT MONITOR DAY: 0
OHS CV HOLTER LENGTH DECIMAL HOURS: 48
OHS CV HOLTER LENGTH HOURS: 48
OHS CV HOLTER LENGTH MINUTES: 0
OHS CV HOLTER SINUS AVERAGE HR: 67
OHS CV HOLTER SINUS MAX HR: 93
OHS CV HOLTER SINUS MIN HR: 50
PISA TR MAX VEL: 2.26 M/S
PULM VEIN S/D RATIO: 1.56
PV PEAK D VEL: 0.45 M/S
PV PEAK S VEL: 0.7 M/S
RA MAJOR: 4.41 CM
RA PRESSURE: 3 MMHG
RA WIDTH: 3.5 CM
RIGHT VENTRICULAR END-DIASTOLIC DIMENSION: 3.83 CM
RV TISSUE DOPPLER FREE WALL SYSTOLIC VELOCITY 1 (APICAL 4 CHAMBER VIEW): 16.08 CM/S
SINUS: 3.73 CM
STJ: 3.44 CM
TDI LATERAL: 0.09 M/S
TDI SEPTAL: 0.07 M/S
TDI: 0.08 M/S
TR MAX PG: 20 MMHG
TRICUSPID ANNULAR PLANE SYSTOLIC EXCURSION: 2.19 CM
TV REST PULMONARY ARTERY PRESSURE: 23 MMHG

## 2022-08-02 ENCOUNTER — TELEPHONE (OUTPATIENT)
Dept: CARDIOLOGY | Facility: CLINIC | Age: 72
End: 2022-08-02
Payer: MEDICARE

## 2022-08-03 ENCOUNTER — TELEPHONE (OUTPATIENT)
Dept: CARDIOLOGY | Facility: CLINIC | Age: 72
End: 2022-08-03
Payer: MEDICARE

## 2022-08-03 NOTE — TELEPHONE ENCOUNTER
Left voice message for pt to call back to reschedule office visit with Dr pascual . LEFT VOICE MESSAGE FOR PT AND WIFE

## 2022-08-04 ENCOUNTER — OFFICE VISIT (OUTPATIENT)
Dept: FAMILY MEDICINE | Facility: CLINIC | Age: 72
End: 2022-08-04
Payer: MEDICARE

## 2022-08-04 VITALS
DIASTOLIC BLOOD PRESSURE: 62 MMHG | WEIGHT: 201.94 LBS | TEMPERATURE: 98 F | HEART RATE: 66 BPM | BODY MASS INDEX: 28.27 KG/M2 | HEIGHT: 71 IN | SYSTOLIC BLOOD PRESSURE: 130 MMHG | OXYGEN SATURATION: 96 %

## 2022-08-04 DIAGNOSIS — E11.9 DIABETES MELLITUS WITHOUT COMPLICATION: ICD-10-CM

## 2022-08-04 DIAGNOSIS — E11.69 HYPERLIPIDEMIA ASSOCIATED WITH TYPE 2 DIABETES MELLITUS: Primary | ICD-10-CM

## 2022-08-04 DIAGNOSIS — E66.3 OVERWEIGHT (BMI 25.0-29.9): ICD-10-CM

## 2022-08-04 DIAGNOSIS — I15.2 HYPERTENSION ASSOCIATED WITH DIABETES: ICD-10-CM

## 2022-08-04 DIAGNOSIS — I48.0 PAROXYSMAL ATRIAL FIBRILLATION: ICD-10-CM

## 2022-08-04 DIAGNOSIS — E11.59 HYPERTENSION ASSOCIATED WITH DIABETES: ICD-10-CM

## 2022-08-04 DIAGNOSIS — E78.5 HYPERLIPIDEMIA ASSOCIATED WITH TYPE 2 DIABETES MELLITUS: Primary | ICD-10-CM

## 2022-08-04 DIAGNOSIS — R60.0 BILATERAL LEG EDEMA: ICD-10-CM

## 2022-08-04 PROCEDURE — 1101F PR PT FALLS ASSESS DOC 0-1 FALLS W/OUT INJ PAST YR: ICD-10-PCS | Mod: CPTII,S$GLB,, | Performed by: NURSE PRACTITIONER

## 2022-08-04 PROCEDURE — 99214 OFFICE O/P EST MOD 30 MIN: CPT | Mod: S$GLB,,, | Performed by: NURSE PRACTITIONER

## 2022-08-04 PROCEDURE — 99999 PR PBB SHADOW E&M-EST. PATIENT-LVL V: ICD-10-PCS | Mod: PBBFAC,,, | Performed by: NURSE PRACTITIONER

## 2022-08-04 PROCEDURE — 99999 PR PBB SHADOW E&M-EST. PATIENT-LVL V: CPT | Mod: PBBFAC,,, | Performed by: NURSE PRACTITIONER

## 2022-08-04 PROCEDURE — 3066F NEPHROPATHY DOC TX: CPT | Mod: CPTII,S$GLB,, | Performed by: NURSE PRACTITIONER

## 2022-08-04 PROCEDURE — 99214 PR OFFICE/OUTPT VISIT, EST, LEVL IV, 30-39 MIN: ICD-10-PCS | Mod: S$GLB,,, | Performed by: NURSE PRACTITIONER

## 2022-08-04 PROCEDURE — 1159F PR MEDICATION LIST DOCUMENTED IN MEDICAL RECORD: ICD-10-PCS | Mod: CPTII,S$GLB,, | Performed by: NURSE PRACTITIONER

## 2022-08-04 PROCEDURE — 1160F PR REVIEW ALL MEDS BY PRESCRIBER/CLIN PHARMACIST DOCUMENTED: ICD-10-PCS | Mod: CPTII,S$GLB,, | Performed by: NURSE PRACTITIONER

## 2022-08-04 PROCEDURE — 3060F POS MICROALBUMINURIA REV: CPT | Mod: CPTII,S$GLB,, | Performed by: NURSE PRACTITIONER

## 2022-08-04 PROCEDURE — 4010F PR ACE/ARB THEARPY RXD/TAKEN: ICD-10-PCS | Mod: CPTII,S$GLB,, | Performed by: NURSE PRACTITIONER

## 2022-08-04 PROCEDURE — 3078F DIAST BP <80 MM HG: CPT | Mod: CPTII,S$GLB,, | Performed by: NURSE PRACTITIONER

## 2022-08-04 PROCEDURE — 3075F SYST BP GE 130 - 139MM HG: CPT | Mod: CPTII,S$GLB,, | Performed by: NURSE PRACTITIONER

## 2022-08-04 PROCEDURE — 1159F MED LIST DOCD IN RCRD: CPT | Mod: CPTII,S$GLB,, | Performed by: NURSE PRACTITIONER

## 2022-08-04 PROCEDURE — 1160F RVW MEDS BY RX/DR IN RCRD: CPT | Mod: CPTII,S$GLB,, | Performed by: NURSE PRACTITIONER

## 2022-08-04 PROCEDURE — 4010F ACE/ARB THERAPY RXD/TAKEN: CPT | Mod: CPTII,S$GLB,, | Performed by: NURSE PRACTITIONER

## 2022-08-04 PROCEDURE — 3008F BODY MASS INDEX DOCD: CPT | Mod: CPTII,S$GLB,, | Performed by: NURSE PRACTITIONER

## 2022-08-04 PROCEDURE — 3288F FALL RISK ASSESSMENT DOCD: CPT | Mod: CPTII,S$GLB,, | Performed by: NURSE PRACTITIONER

## 2022-08-04 PROCEDURE — 1101F PT FALLS ASSESS-DOCD LE1/YR: CPT | Mod: CPTII,S$GLB,, | Performed by: NURSE PRACTITIONER

## 2022-08-04 PROCEDURE — 3066F PR DOCUMENTATION OF TREATMENT FOR NEPHROPATHY: ICD-10-PCS | Mod: CPTII,S$GLB,, | Performed by: NURSE PRACTITIONER

## 2022-08-04 PROCEDURE — 3288F PR FALLS RISK ASSESSMENT DOCUMENTED: ICD-10-PCS | Mod: CPTII,S$GLB,, | Performed by: NURSE PRACTITIONER

## 2022-08-04 PROCEDURE — 3060F PR POS MICROALBUMINURIA RESULT DOCUMENTED/REVIEW: ICD-10-PCS | Mod: CPTII,S$GLB,, | Performed by: NURSE PRACTITIONER

## 2022-08-04 PROCEDURE — 3008F PR BODY MASS INDEX (BMI) DOCUMENTED: ICD-10-PCS | Mod: CPTII,S$GLB,, | Performed by: NURSE PRACTITIONER

## 2022-08-04 PROCEDURE — 3044F HG A1C LEVEL LT 7.0%: CPT | Mod: CPTII,S$GLB,, | Performed by: NURSE PRACTITIONER

## 2022-08-04 PROCEDURE — 1126F AMNT PAIN NOTED NONE PRSNT: CPT | Mod: CPTII,S$GLB,, | Performed by: NURSE PRACTITIONER

## 2022-08-04 PROCEDURE — 3078F PR MOST RECENT DIASTOLIC BLOOD PRESSURE < 80 MM HG: ICD-10-PCS | Mod: CPTII,S$GLB,, | Performed by: NURSE PRACTITIONER

## 2022-08-04 PROCEDURE — 3044F PR MOST RECENT HEMOGLOBIN A1C LEVEL <7.0%: ICD-10-PCS | Mod: CPTII,S$GLB,, | Performed by: NURSE PRACTITIONER

## 2022-08-04 PROCEDURE — 3075F PR MOST RECENT SYSTOLIC BLOOD PRESS GE 130-139MM HG: ICD-10-PCS | Mod: CPTII,S$GLB,, | Performed by: NURSE PRACTITIONER

## 2022-08-04 PROCEDURE — 1126F PR PAIN SEVERITY QUANTIFIED, NO PAIN PRESENT: ICD-10-PCS | Mod: CPTII,S$GLB,, | Performed by: NURSE PRACTITIONER

## 2022-08-04 NOTE — PROGRESS NOTES
Subjective:       Patient ID: Umang Goldberg is a 72 y.o. male.    Chief Complaint: Follow-up (3 month f/u)    HPI    Patient presents today for chronic conditions follow up. He is new to me. history of  depression, COPD, paroxysmal atrial fibrillation, hypertension, dyslipidemia, erectile dysfunction, type 2 diabetes mellitus, and chronic anti coagulant use. Last visit with  on 4/18/22. Labs reviewed 4/12/22: A1c 5.5    6/28/22 Cardiology-Pedone: paroxysmal atrial fib: on Eliquis and propafenone; lasix for  Lower extremity edema    9/21 Dr. Anguiano/  closed compression fx of L2 lumbar vertebra  Past Medical History:   Diagnosis Date    A-fib 1 year    Diabetes mellitus     Hypertension        Review of patient's allergies indicates:   Allergen Reactions    Contrast media          Current Outpatient Medications:     albuterol (PROVENTIL/VENTOLIN HFA) 90 mcg/actuation inhaler, Inhale 2 puffs into the lungs every 6 (six) hours as needed for Wheezing., Disp: 18 g, Rfl: 1    apixaban (ELIQUIS) 5 mg Tab, Take 1 tablet (5 mg total) by mouth 2 (two) times daily., Disp: 180 tablet, Rfl: 2    atorvastatin (LIPITOR) 20 MG tablet, Take 1 tablet (20 mg total) by mouth once daily., Disp: 90 tablet, Rfl: 3    blood sugar diagnostic Strp, Test blood sugar once daily with true matrix, Disp: 100 strip, Rfl: 3    doxycycline (VIBRAMYCIN) 100 MG Cap, Take 1 capsule (100 mg total) by mouth every 12 (twelve) hours., Disp: 20 capsule, Rfl: 0    furosemide (LASIX) 20 MG tablet, Take 2 tablets (40 mg total) by mouth daily as needed (bilateral edema)., Disp: 180 tablet, Rfl: 3    HYDROcodone-acetaminophen (NORCO) 5-325 mg per tablet, 1/2 PO q.4 hours prn the the, Disp: 60 tablet, Rfl: 0    ipratropium (ATROVENT) 0.06 % nasal spray, 2 SPRAYS BY NASAL ROUTE 4 (FOUR) TIMES DAILY., Disp: 15 mL, Rfl: 3    lancets Misc, Test blood sugar once daily with insurance preferred brand., Disp: 100 each, Rfl: 3    lisinopriL  (PRINIVIL,ZESTRIL) 20 MG tablet, Take 1 tablet (20 mg total) by mouth once daily., Disp: 90 tablet, Rfl: 3    magnesium oxide (MAG-OX) 400 mg (241.3 mg magnesium) tablet, Take 1 tablet (400 mg total) by mouth once daily., Disp: , Rfl: 0    magnesium oxide 400 mg magnesium Tab, Take 1 tablet by mouth once daily., Disp: , Rfl:     metFORMIN (GLUCOPHAGE) 1000 MG tablet, Take 1 tablet (1,000 mg total) by mouth 2 (two) times daily., Disp: 180 tablet, Rfl: 1    metoprolol succinate (TOPROL-XL) 25 MG 24 hr tablet, Take 1 tablet (25 mg total) by mouth once daily., Disp: 90 tablet, Rfl: 3    propafenone (RHTHYMOL) 150 MG Tab, Take 1 tablet (150 mg total) by mouth 2 (two) times daily., Disp: 180 tablet, Rfl: 3    senna-docusate 8.6-50 mg (SENNA WITH DOCUSATE SODIUM) 8.6-50 mg per tablet, Take 2 tablets by mouth 2 (two) times daily., Disp: 120 tablet, Rfl: 0    sildenafil (REVATIO) 20 mg Tab, TAKE THREE TABLETS BY MOUTH ONCE DAILY AS NEEDED, Disp: 30 tablet, Rfl: 2    blood-glucose meter kit, True matrix, use as directed, Disp: 1 each, Rfl: 0    lancing device Misc, 1 Device by Misc.(Non-Drug; Combo Route) route 2 (two) times daily with meals., Disp: 1 each, Rfl: 0    pregabalin (LYRICA) 75 MG capsule, Take 1 capsule (75 mg total) by mouth 2 (two) times daily., Disp: 60 capsule, Rfl: 0    Review of Systems   Constitutional: Negative for unexpected weight change.   HENT: Negative for trouble swallowing.    Eyes: Negative for visual disturbance.   Respiratory: Negative for shortness of breath.    Cardiovascular: Negative for chest pain, palpitations and leg swelling.   Gastrointestinal: Negative for blood in stool.   Genitourinary: Negative for hematuria.   Skin: Negative for rash.   Allergic/Immunologic: Negative for immunocompromised state.   Neurological: Negative for headaches.   Hematological: Does not bruise/bleed easily.   Psychiatric/Behavioral: Negative for agitation. The patient is not nervous/anxious.       "  Objective:      /62 (BP Location: Right arm, Patient Position: Sitting, BP Method: Medium (Manual))   Pulse 66   Temp 98 °F (36.7 °C) (Oral)   Ht 5' 11" (1.803 m)   Wt 91.6 kg (201 lb 15.1 oz)   SpO2 96%   BMI 28.17 kg/m²   Physical Exam  Constitutional:       Appearance: He is well-developed.   Eyes:      Conjunctiva/sclera: Conjunctivae normal.      Pupils: Pupils are equal, round, and reactive to light.   Cardiovascular:      Rate and Rhythm: Normal rate and regular rhythm.      Heart sounds: Normal heart sounds.   Pulmonary:      Effort: Pulmonary effort is normal.      Breath sounds: Normal breath sounds.   Abdominal:      General: Bowel sounds are normal.      Palpations: Abdomen is soft.   Musculoskeletal:         General: Normal range of motion.      Cervical back: Normal range of motion and neck supple.   Skin:     General: Skin is warm and dry.   Neurological:      Mental Status: He is alert and oriented to person, place, and time.   Psychiatric:         Behavior: Behavior normal.         Thought Content: Thought content normal.         Judgment: Judgment normal.         Assessment:       1. Hyperlipidemia associated with type 2 diabetes mellitus    2. Hypertension associated with diabetes    3. Diabetes mellitus without complication    4. Paroxysmal atrial fibrillation    5. Bilateral leg edema    6. Overweight (BMI 25.0-29.9)        Plan:       Hyperlipidemia associated with type 2 diabetes mellitus  -     Lipid Panel; Future; Expected date: 08/04/2022  Stable, continue management  Hypertension associated with diabetes  Stable, continue medication  Low sodium diet  BP Readings from Last 3 Encounters:   08/04/22 130/62   06/28/22 (!) 148/75   04/18/22 136/76     Diabetes mellitus without complication  -     Hemoglobin A1C; Future; Expected date: 08/04/2022  -     CBC W/ AUTO DIFFERENTIAL; Future; Expected date: 08/04/2022  -     COMPREHENSIVE METABOLIC PANEL; Future; Expected date: " "08/04/2022  -     Diabetic Eye Screening Photo; Future  Stable, continue management  Follow the ADA diet  Hemoglobin A1C   Date Value Ref Range Status   04/12/2022 5.5 4.0 - 5.6 % Final     Comment:     ADA Screening Guidelines:  5.7-6.4%  Consistent with prediabetes  >or=6.5%  Consistent with diabetes    High levels of fetal hemoglobin interfere with the HbA1C  assay. Heterozygous hemoglobin variants (HbS, HgC, etc)do  not significantly interfere with this assay.   However, presence of multiple variants may affect accuracy.     10/15/2021 5.6 4.0 - 5.6 % Final     Comment:     ADA Screening Guidelines:  5.7-6.4%  Consistent with prediabetes  >or=6.5%  Consistent with diabetes    High levels of fetal hemoglobin interfere with the HbA1C  assay. Heterozygous hemoglobin variants (HbS, HgC, etc)do  not significantly interfere with this assay.   However, presence of multiple variants may affect accuracy.     07/09/2021 5.9 (H) 4.0 - 5.6 % Final     Comment:     ADA Screening Guidelines:  5.7-6.4%  Consistent with prediabetes  >or=6.5%  Consistent with diabetes    High levels of fetal hemoglobin interfere with the HbA1C  assay. Heterozygous hemoglobin variants (HbS, HgC, etc)do  not significantly interfere with this assay.   However, presence of multiple variants may affect accuracy.       Paroxysmal atrial fibrillation  Stable, continue Cards follow up  Bilateral leg edema  Stable, continue lasix  Overweight (BMI 25.0-29.9)  Counseled patient on his ideal body weight, health consequences of being obese and current recommendations including weekly exercise and a heart healthy diet.  Current BMI is:Estimated body mass index is 28.17 kg/m² as calculated from the following:    Height as of this encounter: 5' 11" (1.803 m).    Weight as of this encounter: 91.6 kg (201 lb 15.1 oz)..  Patient is aware that ideal BMI < 25 or Weight in (lb) to have BMI = 25: 178.9.            Patient readiness: acceptance and " barriers:none    During the course of the visit the patient was educated and counseled about the following:     Diabetes:  Discussed general issues about diabetes pathophysiology and management.  Addressed ADA diet.  Encouraged aerobic exercise.  Hypertension:   Dietary sodium restriction.  Regular aerobic exercise.    Goals: Diabetes: Maintain Hemoglobin A1C below 7 and Hypertension: Reduce Blood Pressure    Did patient meet goals/outcomes: Yes    The following self management tools provided: declined    Patient Instructions (the written plan) was given to the patient/family.     Time spent with patient: 15 minutes    Barriers to medications present (no )    Adverse reactions to current medications (no)    Over the counter medications reviewed (Yes)

## 2022-08-04 NOTE — PATIENT INSTRUCTIONS
Vignesh Heck,     If you are due for any health screening(s) below please notify me so we can arrange them to be ordered and scheduled to maintain your health. Most healthy patients complete it. Don't lose out on improving your health.     Tests to Keep You Healthy    Eye Exam: ORDERED BUT NOT SCHEDULED  Colon Cancer Screening: Met  Blood Pressure <= 139/89: Yes  HbA1c < 8: Yes  Tobacco Cessation: NO                Diabetic Retinal Eye Exam    Diabetes is the #1 cause of blindness in the [place] - early detection before signs or symptoms develop can prevent debilitating blindness.    Once-a-year screening is recommended for all diabetic patients. This exam can prevent and treat diabetes complications in the eye before developing symptoms. This can be done with a special camera is used to take photographs of the back of your eye without having to dilate them, or you can see an eye doctor for a full dilated exam.    Although you are still overdue for this important screening, due to the COVID-19 pandemic, we recommend scheduling it in the near future.

## 2022-08-15 RX ORDER — PROPAFENONE HYDROCHLORIDE 150 MG/1
150 TABLET, COATED ORAL 2 TIMES DAILY
Qty: 180 TABLET | Refills: 3 | Status: SHIPPED | OUTPATIENT
Start: 2022-08-15

## 2022-08-15 NOTE — TELEPHONE ENCOUNTER
----- Message from Radha Vang sent at 8/15/2022  8:47 AM CDT -----  Who Called: Patient    What is the reqeust in detail: New Rx - [facility] but [facility] and he needs a New Rx sent to [facility]:    Brobevanone [id number] taking 1 tablet every 12 hours.     PHARMACY: [facility]ERONICA HERNANDEZ [address] [place] [address]  Phone: [phone] Fax: [phone]    Can the clinic reply by [facility]? NO    Best Call Back Number: [phone]    Additional Information:

## 2022-08-24 ENCOUNTER — OFFICE VISIT (OUTPATIENT)
Dept: CARDIOLOGY | Facility: CLINIC | Age: 72
End: 2022-08-24
Payer: MEDICARE

## 2022-08-24 VITALS
BODY MASS INDEX: 28.7 KG/M2 | HEART RATE: 72 BPM | SYSTOLIC BLOOD PRESSURE: 134 MMHG | WEIGHT: 205 LBS | DIASTOLIC BLOOD PRESSURE: 71 MMHG | HEIGHT: 71 IN

## 2022-08-24 DIAGNOSIS — E11.59 TYPE 2 DIABETES MELLITUS WITH OTHER CIRCULATORY COMPLICATIONS: ICD-10-CM

## 2022-08-24 DIAGNOSIS — E78.1 HYPERTRIGLYCERIDEMIA: ICD-10-CM

## 2022-08-24 DIAGNOSIS — Z79.899 LONG TERM CURRENT USE OF ANTIARRHYTHMIC DRUG: ICD-10-CM

## 2022-08-24 DIAGNOSIS — E78.5 DYSLIPIDEMIA (HIGH LDL; LOW HDL): ICD-10-CM

## 2022-08-24 DIAGNOSIS — I10 ESSENTIAL HYPERTENSION: ICD-10-CM

## 2022-08-24 DIAGNOSIS — I48.0 PAROXYSMAL ATRIAL FIBRILLATION: Primary | ICD-10-CM

## 2022-08-24 DIAGNOSIS — E65 ABDOMINAL OBESITY: ICD-10-CM

## 2022-08-24 DIAGNOSIS — Z91.89 AT RISK FOR CARDIOVASCULAR EVENT: ICD-10-CM

## 2022-08-24 PROCEDURE — 99999 PR PBB SHADOW E&M-EST. PATIENT-LVL IV: ICD-10-PCS | Mod: PBBFAC,,, | Performed by: INTERNAL MEDICINE

## 2022-08-24 PROCEDURE — 3044F PR MOST RECENT HEMOGLOBIN A1C LEVEL <7.0%: ICD-10-PCS | Mod: CPTII,S$GLB,, | Performed by: INTERNAL MEDICINE

## 2022-08-24 PROCEDURE — 3008F BODY MASS INDEX DOCD: CPT | Mod: CPTII,S$GLB,, | Performed by: INTERNAL MEDICINE

## 2022-08-24 PROCEDURE — 4010F ACE/ARB THERAPY RXD/TAKEN: CPT | Mod: CPTII,S$GLB,, | Performed by: INTERNAL MEDICINE

## 2022-08-24 PROCEDURE — 1101F PT FALLS ASSESS-DOCD LE1/YR: CPT | Mod: CPTII,S$GLB,, | Performed by: INTERNAL MEDICINE

## 2022-08-24 PROCEDURE — 1126F AMNT PAIN NOTED NONE PRSNT: CPT | Mod: CPTII,S$GLB,, | Performed by: INTERNAL MEDICINE

## 2022-08-24 PROCEDURE — 1160F RVW MEDS BY RX/DR IN RCRD: CPT | Mod: CPTII,S$GLB,, | Performed by: INTERNAL MEDICINE

## 2022-08-24 PROCEDURE — 3044F HG A1C LEVEL LT 7.0%: CPT | Mod: CPTII,S$GLB,, | Performed by: INTERNAL MEDICINE

## 2022-08-24 PROCEDURE — 1159F PR MEDICATION LIST DOCUMENTED IN MEDICAL RECORD: ICD-10-PCS | Mod: CPTII,S$GLB,, | Performed by: INTERNAL MEDICINE

## 2022-08-24 PROCEDURE — 3078F PR MOST RECENT DIASTOLIC BLOOD PRESSURE < 80 MM HG: ICD-10-PCS | Mod: CPTII,S$GLB,, | Performed by: INTERNAL MEDICINE

## 2022-08-24 PROCEDURE — 3078F DIAST BP <80 MM HG: CPT | Mod: CPTII,S$GLB,, | Performed by: INTERNAL MEDICINE

## 2022-08-24 PROCEDURE — 3008F PR BODY MASS INDEX (BMI) DOCUMENTED: ICD-10-PCS | Mod: CPTII,S$GLB,, | Performed by: INTERNAL MEDICINE

## 2022-08-24 PROCEDURE — 3060F POS MICROALBUMINURIA REV: CPT | Mod: CPTII,S$GLB,, | Performed by: INTERNAL MEDICINE

## 2022-08-24 PROCEDURE — 3066F PR DOCUMENTATION OF TREATMENT FOR NEPHROPATHY: ICD-10-PCS | Mod: CPTII,S$GLB,, | Performed by: INTERNAL MEDICINE

## 2022-08-24 PROCEDURE — 3288F FALL RISK ASSESSMENT DOCD: CPT | Mod: CPTII,S$GLB,, | Performed by: INTERNAL MEDICINE

## 2022-08-24 PROCEDURE — 1160F PR REVIEW ALL MEDS BY PRESCRIBER/CLIN PHARMACIST DOCUMENTED: ICD-10-PCS | Mod: CPTII,S$GLB,, | Performed by: INTERNAL MEDICINE

## 2022-08-24 PROCEDURE — 3075F PR MOST RECENT SYSTOLIC BLOOD PRESS GE 130-139MM HG: ICD-10-PCS | Mod: CPTII,S$GLB,, | Performed by: INTERNAL MEDICINE

## 2022-08-24 PROCEDURE — 99214 OFFICE O/P EST MOD 30 MIN: CPT | Mod: S$GLB,,, | Performed by: INTERNAL MEDICINE

## 2022-08-24 PROCEDURE — 99999 PR PBB SHADOW E&M-EST. PATIENT-LVL IV: CPT | Mod: PBBFAC,,, | Performed by: INTERNAL MEDICINE

## 2022-08-24 PROCEDURE — 3060F PR POS MICROALBUMINURIA RESULT DOCUMENTED/REVIEW: ICD-10-PCS | Mod: CPTII,S$GLB,, | Performed by: INTERNAL MEDICINE

## 2022-08-24 PROCEDURE — 4010F PR ACE/ARB THEARPY RXD/TAKEN: ICD-10-PCS | Mod: CPTII,S$GLB,, | Performed by: INTERNAL MEDICINE

## 2022-08-24 PROCEDURE — 1126F PR PAIN SEVERITY QUANTIFIED, NO PAIN PRESENT: ICD-10-PCS | Mod: CPTII,S$GLB,, | Performed by: INTERNAL MEDICINE

## 2022-08-24 PROCEDURE — 3288F PR FALLS RISK ASSESSMENT DOCUMENTED: ICD-10-PCS | Mod: CPTII,S$GLB,, | Performed by: INTERNAL MEDICINE

## 2022-08-24 PROCEDURE — 1101F PR PT FALLS ASSESS DOC 0-1 FALLS W/OUT INJ PAST YR: ICD-10-PCS | Mod: CPTII,S$GLB,, | Performed by: INTERNAL MEDICINE

## 2022-08-24 PROCEDURE — 3066F NEPHROPATHY DOC TX: CPT | Mod: CPTII,S$GLB,, | Performed by: INTERNAL MEDICINE

## 2022-08-24 PROCEDURE — 99214 PR OFFICE/OUTPT VISIT, EST, LEVL IV, 30-39 MIN: ICD-10-PCS | Mod: S$GLB,,, | Performed by: INTERNAL MEDICINE

## 2022-08-24 PROCEDURE — 1159F MED LIST DOCD IN RCRD: CPT | Mod: CPTII,S$GLB,, | Performed by: INTERNAL MEDICINE

## 2022-08-24 PROCEDURE — 3075F SYST BP GE 130 - 139MM HG: CPT | Mod: CPTII,S$GLB,, | Performed by: INTERNAL MEDICINE

## 2022-08-24 NOTE — PROGRESS NOTES
Subjective:    Patient ID:  Umang Goldberg is a 72 y.o. male patient here for evaluation Follow-up (results)      History of Present Illness:  Cardiology follow-up.  History of paroxysmal atrial fibrillation.  Echo and Holter monitor from last visit unremarkable, preserved ejection fraction.  Benign ectopy.  No atrial fib.  Patient maintained on Eliquis 5 b.i.d. as well as propafenone 150 b.i.d..  Stable [place].  Patient is in active due to past a back injury.  Ongoing risk factors include tobacco use, diabetes mellitus, hypertension, dyslipidemia family history.    No recent noninvasive cardiac assessment for ischemia.        Review of patient's allergies indicates:   Allergen Reactions    Contrast media        Past Medical History:   Diagnosis Date    A-fib 1 year    Diabetes mellitus     Hypertension      Past Surgical History:   Procedure Laterality Date    BACK SURGERY      EYE SURGERY      HERNIA REPAIR       Social History     Tobacco Use    Smoking status: Current Every Day Smoker     Packs/day: 0.50     Types: Vaping with nicotine, Cigarettes    Smokeless tobacco: Never Used   Substance Use Topics    Alcohol use: Yes     Alcohol/week: 4.0 - 6.0 standard drinks     Types: 4 - 6 Cans of beer per week    Drug use: Yes     Types: Hydrocodone        Review of Systems:    As noted in HPI in addition      REVIEW OF SYSTEMS  Review of Systems   Constitutional: Negative for decreased appetite, diaphoresis, night sweats, weight gain and weight loss.   HENT: Negative for nosebleeds and odynophagia.    Eyes: Negative for double vision and photophobia.   Cardiovascular: Negative for chest pain, claudication, cyanosis, dyspnea on exertion, irregular heartbeat, leg swelling, near-syncope, orthopnea, palpitations, paroxysmal nocturnal dyspnea and syncope.   Respiratory: Negative for cough, hemoptysis, shortness of breath and wheezing.    Hematologic/Lymphatic: Negative for adenopathy.   Skin: Negative for flushing,  skin cancer and suspicious lesions.   Musculoskeletal: Negative for gout, myalgias and neck pain.   Gastrointestinal: Negative for abdominal pain, heartburn, hematemesis and hematochezia.   Genitourinary: Negative for bladder incontinence, hesitancy and nocturia.   Neurological: Negative for focal weakness, headaches, light-headedness and paresthesias.   Psychiatric/Behavioral: Negative for memory loss and substance abuse.              Objective:        Vitals:    08/24/22 0952   BP: 134/71   Pulse: 72       Lab Results   Component Value Date    WBC 5.99 04/12/2022    HGB 13.6 (L) 04/12/2022    HCT 42.3 04/12/2022     04/12/2022    CHOL 121 04/12/2022    TRIG 161 (H) 04/12/2022    HDL 32 (L) 04/12/2022    ALT 10 04/12/2022    AST 12 04/12/2022     04/12/2022    K 4.6 04/12/2022     04/12/2022    CREATININE 1.0 04/12/2022    BUN 14 04/12/2022    CO2 26 04/12/2022    TSH 2.077 12/17/2018    PSA 3.2 10/15/2021    HGBA1C 5.5 04/12/2022        ECHOCARDIOGRAM RESULTS  Results for orders placed in visit on 07/28/22    Echo    Interpretation Summary  · Concentric remodeling and normal systolic function.  · The estimated ejection fraction is 55%.  · Normal left ventricular diastolic function.  · Normal right ventricular size with normal right ventricular systolic function.  · Mild aortic regurgitation.  · Normal central venous pressure (3 mmHg).  · The estimated PA systolic pressure is 23 mmHg.        CURRENT/PREVIOUS VISIT EKG  Results for orders placed or performed in visit on 07/10/19   EKG 12-lead    Collection Time: 07/10/19 12:17 PM    Narrative    Test Reason : I48.0,    Vent. Rate : 060 BPM     Atrial Rate : 060 BPM     P-R Int : 212 ms          QRS Dur : 084 ms      QT Int : 374 ms       P-R-T Axes : 067 074 073 degrees     QTc Int : 374 ms    Sinus rhythm with 1st degree A-V block  Otherwise normal ECG  When compared with ECG of 10-AUG-2018 07:32,  Criteria for Septal infarct are no longer  Present  Confirmed by Apollo Zaragoza MD ([id number]) on 7/10/2019 5:27:32 PM    Referred By:             Confirmed By:Apollo Zaragoza MD     No valid procedures specified.   No results found for this or any previous visit.    No valid procedures specified.    PHYSICAL EXAM  CONSTITUTIONAL: Well built, well nourished in no apparent distress  NECK: no carotid bruit, no JVD  LUNGS: CTA  CHEST WALL: no tenderness,  HEART: regular rate and rhythm, S1, S2 normal, no murmur, click, rub or gallop   ABDOMEN: soft, non-tender; bowel sounds normal; no masses,  no organomegaly  EXTREMITIES: Extremities normal, no edema, no calf tenderness noted  NEURO: AAO X 3    I HAVE REVIEWED :    The vital signs, nurses notes, and all the pertinent radiology and labs.         Current Outpatient Medications   Medication Instructions    albuterol (PROVENTIL/VENTOLIN HFA) 90 mcg/actuation inhaler 2 puffs, Inhalation, Every 6 hours PRN    apixaban (ELIQUIS) 5 mg, Oral, 2 times daily    atorvastatin (LIPITOR) 20 mg, Oral, Daily    blood sugar diagnostic Strp Test blood sugar once daily with true matrix    blood-glucose meter kit True matrix, use as directed    doxycycline (VIBRAMYCIN) 100 mg, Oral, Every 12 hours    furosemide (LASIX) 40 mg, Oral, Daily PRN    HYDROcodone-acetaminophen (NORCO) 5-325 mg per tablet 1/2 PO q.4 hours prn the the    ipratropium (ATROVENT) 0.06 % nasal spray 2 sprays, Nasal, 4 times daily    lancets Misc Test blood sugar once daily with insurance preferred brand.    lancing device Misc 1 Device, Misc.(Non-Drug; Combo Route), 2 times daily with meals    lisinopriL (PRINIVIL,ZESTRIL) 20 mg, Oral, Daily    magnesium oxide (MAG-OX) 400 mg, Oral, Daily    magnesium oxide 400 mg magnesium Tab 1 tablet, Oral, Daily    metFORMIN (GLUCOPHAGE) 1,000 mg, Oral, 2 times daily    metoprolol succinate (TOPROL-XL) 25 mg, Oral, Daily    pregabalin (LYRICA) 75 mg, Oral, 2 times daily    propafenone (RHTHYMOL) 150 mg, Oral, 2 times  daily    senna-docusate 8.6-50 mg (SENNA WITH DOCUSATE SODIUM) 8.6-50 mg per tablet 2 tablets, Oral, 2 times daily    sildenafil (REVATIO) 20 mg Tab TAKE THREE TABLETS BY MOUTH ONCE DAILY AS NEEDED          Assessment:   Paroxysmal atrial fibrillation.  Stable on propafenone 150 b.i.d. and Eliquis 5 b.i.d..  History of COPD ongoing tobacco use.  Diabetes mellitus, hypertension, dyslipidemia, positive family history.          Plan:   Suggest follow-up chest x-ray.   Call if normal otherwise return to clinic.  Routine follow-up 6 months.  Patient unable exercise.  Refuses Lexiscan.        No follow-ups on file.

## 2022-10-14 ENCOUNTER — TELEPHONE (OUTPATIENT)
Dept: CARDIOLOGY | Facility: CLINIC | Age: 72
End: 2022-10-14
Payer: MEDICARE

## 2022-10-28 ENCOUNTER — HOSPITAL ENCOUNTER (OUTPATIENT)
Dept: RADIOLOGY | Facility: HOSPITAL | Age: 72
Discharge: HOME | End: 2022-10-28
Attending: INTERNAL MEDICINE
Payer: MEDICARE

## 2022-10-28 DIAGNOSIS — E78.5 DYSLIPIDEMIA (HIGH LDL; LOW HDL): ICD-10-CM

## 2022-10-28 DIAGNOSIS — Z91.89 AT RISK FOR CARDIOVASCULAR EVENT: ICD-10-CM

## 2022-10-28 DIAGNOSIS — E78.1 HYPERTRIGLYCERIDEMIA: ICD-10-CM

## 2022-10-28 DIAGNOSIS — I10 ESSENTIAL HYPERTENSION: ICD-10-CM

## 2022-10-28 DIAGNOSIS — E65 ABDOMINAL OBESITY: ICD-10-CM

## 2022-10-28 DIAGNOSIS — Z79.899 LONG TERM CURRENT USE OF ANTIARRHYTHMIC DRUG: ICD-10-CM

## 2022-10-28 DIAGNOSIS — E11.59 TYPE 2 DIABETES MELLITUS WITH OTHER CIRCULATORY COMPLICATIONS: ICD-10-CM

## 2022-10-28 DIAGNOSIS — I48.0 PAROXYSMAL ATRIAL FIBRILLATION: ICD-10-CM

## 2022-10-28 PROCEDURE — 71046 X-RAY EXAM CHEST 2 VIEWS: CPT | Mod: 26,,, | Performed by: RADIOLOGY

## 2022-10-28 PROCEDURE — 71046 X-RAY EXAM CHEST 2 VIEWS: CPT | Mod: TC,FY

## 2022-10-28 PROCEDURE — 71046 XR CHEST PA AND LATERAL: ICD-10-PCS | Mod: 26,,, | Performed by: RADIOLOGY

## 2022-11-02 ENCOUNTER — PES CALL (OUTPATIENT)
Dept: ADMINISTRATIVE | Facility: CLINIC | Age: 72
End: 2022-11-02
Payer: MEDICARE

## 2022-11-02 DIAGNOSIS — E11.628 TYPE 2 DIABETES, CONTROLLED, WITH CELLULITIS OF FOOT: ICD-10-CM

## 2022-11-02 DIAGNOSIS — L03.119 TYPE 2 DIABETES, CONTROLLED, WITH CELLULITIS OF FOOT: ICD-10-CM

## 2022-11-02 NOTE — TELEPHONE ENCOUNTER
----- Message from Thierno Quick sent at 11/2/2022  8:18 AM CDT -----  Contact: self  Type: RX Refill Request        Who Called: Patient   Refill or New Rx: refill   RX Name and Strength: metFORMIN (GLUCOPHAGE) 1000 MG tablet and apixaban (ELIQUIS) 5 mg Tab  How is the patient currently taking it? (ex. 1XDay): as directed   Is this a 30 day or 90 day RX: 90 day supply   Preferred Pharmacy with phone number:   [id number] - [place], [place] - [address] [address]  Phone: [phone] Fax: [phone]  Local or Mail Order: local   Ordering Provider: SUE Moss   Best Call Back Number: [phone] (home)   Additional Information: Patient just needs a refill. Thanks

## 2022-11-04 ENCOUNTER — OFFICE VISIT (OUTPATIENT)
Dept: FAMILY MEDICINE | Facility: CLINIC | Age: 72
End: 2022-11-04
Payer: MEDICARE

## 2022-11-04 VITALS
BODY MASS INDEX: 29.23 KG/M2 | HEART RATE: 59 BPM | OXYGEN SATURATION: 93 % | SYSTOLIC BLOOD PRESSURE: 122 MMHG | HEIGHT: 71 IN | TEMPERATURE: 98 F | WEIGHT: 208.75 LBS | DIASTOLIC BLOOD PRESSURE: 68 MMHG

## 2022-11-04 DIAGNOSIS — I15.2 HYPERTENSION ASSOCIATED WITH DIABETES: ICD-10-CM

## 2022-11-04 DIAGNOSIS — E11.59 HYPERTENSION ASSOCIATED WITH DIABETES: ICD-10-CM

## 2022-11-04 DIAGNOSIS — J44.9 CHRONIC OBSTRUCTIVE PULMONARY DISEASE, UNSPECIFIED COPD TYPE: ICD-10-CM

## 2022-11-04 DIAGNOSIS — E11.69 HYPERLIPIDEMIA ASSOCIATED WITH TYPE 2 DIABETES MELLITUS: ICD-10-CM

## 2022-11-04 DIAGNOSIS — Z23 FLU VACCINE NEED: ICD-10-CM

## 2022-11-04 DIAGNOSIS — E66.3 OVERWEIGHT (BMI 25.0-29.9): ICD-10-CM

## 2022-11-04 DIAGNOSIS — E78.5 HYPERLIPIDEMIA ASSOCIATED WITH TYPE 2 DIABETES MELLITUS: ICD-10-CM

## 2022-11-04 DIAGNOSIS — E11.21 WELL CONTROLLED TYPE 2 DIABETES MELLITUS WITH NEPHROPATHY: Primary | ICD-10-CM

## 2022-11-04 DIAGNOSIS — I48.0 PAROXYSMAL ATRIAL FIBRILLATION: ICD-10-CM

## 2022-11-04 PROCEDURE — 4010F PR ACE/ARB THEARPY RXD/TAKEN: ICD-10-PCS | Mod: CPTII,S$GLB,, | Performed by: NURSE PRACTITIONER

## 2022-11-04 PROCEDURE — 3066F PR DOCUMENTATION OF TREATMENT FOR NEPHROPATHY: ICD-10-PCS | Mod: CPTII,S$GLB,, | Performed by: NURSE PRACTITIONER

## 2022-11-04 PROCEDURE — 3288F FALL RISK ASSESSMENT DOCD: CPT | Mod: CPTII,S$GLB,, | Performed by: NURSE PRACTITIONER

## 2022-11-04 PROCEDURE — 3044F PR MOST RECENT HEMOGLOBIN A1C LEVEL <7.0%: ICD-10-PCS | Mod: CPTII,S$GLB,, | Performed by: NURSE PRACTITIONER

## 2022-11-04 PROCEDURE — 3288F PR FALLS RISK ASSESSMENT DOCUMENTED: ICD-10-PCS | Mod: CPTII,S$GLB,, | Performed by: NURSE PRACTITIONER

## 2022-11-04 PROCEDURE — 99214 PR OFFICE/OUTPT VISIT, EST, LEVL IV, 30-39 MIN: ICD-10-PCS | Mod: S$GLB,,, | Performed by: NURSE PRACTITIONER

## 2022-11-04 PROCEDURE — 1160F PR REVIEW ALL MEDS BY PRESCRIBER/CLIN PHARMACIST DOCUMENTED: ICD-10-PCS | Mod: CPTII,S$GLB,, | Performed by: NURSE PRACTITIONER

## 2022-11-04 PROCEDURE — 90694 FLU VACCINE - QUADRIVALENT - ADJUVANTED: ICD-10-PCS | Mod: S$GLB,,, | Performed by: NURSE PRACTITIONER

## 2022-11-04 PROCEDURE — 99214 OFFICE O/P EST MOD 30 MIN: CPT | Mod: S$GLB,,, | Performed by: NURSE PRACTITIONER

## 2022-11-04 PROCEDURE — 3044F HG A1C LEVEL LT 7.0%: CPT | Mod: CPTII,S$GLB,, | Performed by: NURSE PRACTITIONER

## 2022-11-04 PROCEDURE — 3074F PR MOST RECENT SYSTOLIC BLOOD PRESSURE < 130 MM HG: ICD-10-PCS | Mod: CPTII,S$GLB,, | Performed by: NURSE PRACTITIONER

## 2022-11-04 PROCEDURE — 1159F PR MEDICATION LIST DOCUMENTED IN MEDICAL RECORD: ICD-10-PCS | Mod: CPTII,S$GLB,, | Performed by: NURSE PRACTITIONER

## 2022-11-04 PROCEDURE — 1101F PT FALLS ASSESS-DOCD LE1/YR: CPT | Mod: CPTII,S$GLB,, | Performed by: NURSE PRACTITIONER

## 2022-11-04 PROCEDURE — 3078F PR MOST RECENT DIASTOLIC BLOOD PRESSURE < 80 MM HG: ICD-10-PCS | Mod: CPTII,S$GLB,, | Performed by: NURSE PRACTITIONER

## 2022-11-04 PROCEDURE — G0008 ADMIN INFLUENZA VIRUS VAC: HCPCS | Mod: S$GLB,,, | Performed by: NURSE PRACTITIONER

## 2022-11-04 PROCEDURE — G0008 FLU VACCINE - QUADRIVALENT - ADJUVANTED: ICD-10-PCS | Mod: S$GLB,,, | Performed by: NURSE PRACTITIONER

## 2022-11-04 PROCEDURE — 99999 PR PBB SHADOW E&M-EST. PATIENT-LVL IV: ICD-10-PCS | Mod: PBBFAC,,, | Performed by: NURSE PRACTITIONER

## 2022-11-04 PROCEDURE — 1101F PR PT FALLS ASSESS DOC 0-1 FALLS W/OUT INJ PAST YR: ICD-10-PCS | Mod: CPTII,S$GLB,, | Performed by: NURSE PRACTITIONER

## 2022-11-04 PROCEDURE — 3060F POS MICROALBUMINURIA REV: CPT | Mod: CPTII,S$GLB,, | Performed by: NURSE PRACTITIONER

## 2022-11-04 PROCEDURE — 1160F RVW MEDS BY RX/DR IN RCRD: CPT | Mod: CPTII,S$GLB,, | Performed by: NURSE PRACTITIONER

## 2022-11-04 PROCEDURE — 3008F PR BODY MASS INDEX (BMI) DOCUMENTED: ICD-10-PCS | Mod: CPTII,S$GLB,, | Performed by: NURSE PRACTITIONER

## 2022-11-04 PROCEDURE — 1126F PR PAIN SEVERITY QUANTIFIED, NO PAIN PRESENT: ICD-10-PCS | Mod: CPTII,S$GLB,, | Performed by: NURSE PRACTITIONER

## 2022-11-04 PROCEDURE — 4010F ACE/ARB THERAPY RXD/TAKEN: CPT | Mod: CPTII,S$GLB,, | Performed by: NURSE PRACTITIONER

## 2022-11-04 PROCEDURE — 3008F BODY MASS INDEX DOCD: CPT | Mod: CPTII,S$GLB,, | Performed by: NURSE PRACTITIONER

## 2022-11-04 PROCEDURE — 3078F DIAST BP <80 MM HG: CPT | Mod: CPTII,S$GLB,, | Performed by: NURSE PRACTITIONER

## 2022-11-04 PROCEDURE — 3060F PR POS MICROALBUMINURIA RESULT DOCUMENTED/REVIEW: ICD-10-PCS | Mod: CPTII,S$GLB,, | Performed by: NURSE PRACTITIONER

## 2022-11-04 PROCEDURE — 3066F NEPHROPATHY DOC TX: CPT | Mod: CPTII,S$GLB,, | Performed by: NURSE PRACTITIONER

## 2022-11-04 PROCEDURE — 90694 VACC AIIV4 NO PRSRV 0.5ML IM: CPT | Mod: S$GLB,,, | Performed by: NURSE PRACTITIONER

## 2022-11-04 PROCEDURE — 1126F AMNT PAIN NOTED NONE PRSNT: CPT | Mod: CPTII,S$GLB,, | Performed by: NURSE PRACTITIONER

## 2022-11-04 PROCEDURE — 99999 PR PBB SHADOW E&M-EST. PATIENT-LVL IV: CPT | Mod: PBBFAC,,, | Performed by: NURSE PRACTITIONER

## 2022-11-04 PROCEDURE — 3074F SYST BP LT 130 MM HG: CPT | Mod: CPTII,S$GLB,, | Performed by: NURSE PRACTITIONER

## 2022-11-04 PROCEDURE — 1159F MED LIST DOCD IN RCRD: CPT | Mod: CPTII,S$GLB,, | Performed by: NURSE PRACTITIONER

## 2022-11-04 RX ORDER — METFORMIN HYDROCHLORIDE 1000 MG/1
1000 TABLET ORAL 2 TIMES DAILY
Qty: 180 TABLET | Refills: 1 | OUTPATIENT
Start: 2022-11-04

## 2022-11-04 RX ORDER — LISINOPRIL 20 MG/1
20 TABLET ORAL DAILY
Qty: 90 TABLET | Refills: 3 | Status: SHIPPED | OUTPATIENT
Start: 2022-11-04 | End: 2023-12-03 | Stop reason: SDUPTHER

## 2022-11-04 RX ORDER — METFORMIN HYDROCHLORIDE 1000 MG/1
1000 TABLET ORAL 2 TIMES DAILY WITH MEALS
Qty: 180 TABLET | Refills: 3 | Status: SHIPPED | OUTPATIENT
Start: 2022-11-04 | End: 2023-10-17 | Stop reason: SDUPTHER

## 2022-11-04 NOTE — PROGRESS NOTES
Subjective:       Patient ID: Umang Goldberg is a 72 y.o. male.    Chief Complaint: Follow-up    HPI    Patient presents today for follow up. He is established with [facility]. He is due for medication refills. Labs reviewed at visit.      8/24/22 Cardiology : paroxysmal atrial fibrillation-on Eliquis 5 b.i.d. as well as propafenone 150 b.i.d..    9/21 Dr. Anguiano/  closed compression fx of L2 lumbar vertebra  Past Medical History:   Diagnosis Date    A-fib 1 year    Diabetes mellitus     Hypertension        Review of patient's allergies indicates:   Allergen Reactions    Contrast media          Current Outpatient Medications:     apixaban (ELIQUIS) 5 mg Tab, Take 1 tablet (5 mg total) by mouth 2 (two) times daily., Disp: 180 tablet, Rfl: 2    atorvastatin (LIPITOR) 20 MG tablet, Take 1 tablet (20 mg total) by mouth once daily., Disp: 90 tablet, Rfl: 3    blood sugar diagnostic Strp, Test blood sugar once daily with true matrix, Disp: 100 strip, Rfl: 3    furosemide (LASIX) 20 MG tablet, Take 2 tablets (40 mg total) by mouth daily as needed (bilateral edema)., Disp: 180 tablet, Rfl: 3    lancets Misc, Test blood sugar once daily with insurance preferred brand., Disp: 100 each, Rfl: 3    lisinopriL (PRINIVIL,ZESTRIL) 20 MG tablet, Take 1 tablet (20 mg total) by mouth once daily., Disp: 90 tablet, Rfl: 3    metFORMIN (GLUCOPHAGE) 1000 MG tablet, Take 1 tablet (1,000 mg total) by mouth 2 (two) times daily., Disp: 180 tablet, Rfl: 1    metoprolol succinate (TOPROL-XL) 25 MG 24 hr tablet, Take 1 tablet (25 mg total) by mouth once daily., Disp: 90 tablet, Rfl: 3    propafenone (RHTHYMOL) 150 MG Tab, Take 1 tablet (150 mg total) by mouth 2 (two) times daily., Disp: 180 tablet, Rfl: 3    sildenafil (REVATIO) 20 mg Tab, TAKE THREE TABLETS BY MOUTH ONCE DAILY AS NEEDED, Disp: 30 tablet, Rfl: 2    apixaban (ELIQUIS) 5 mg Tab, Take 1 tablet (5 mg total) by mouth 2 (two) times daily., Disp: 180 tablet,  "Rfl: 0    blood-glucose meter kit, True matrix, use as directed, Disp: 1 each, Rfl: 0    lancing device Misc, 1 Device by Misc.(Non-Drug; Combo Route) route 2 (two) times daily with meals., Disp: 1 each, Rfl: 0    metFORMIN (GLUCOPHAGE) 1000 MG tablet, Take 1 tablet (1,000 mg total) by mouth 2 (two) times daily with meals., Disp: 180 tablet, Rfl: 3    pregabalin (LYRICA) 75 MG capsule, Take 1 capsule (75 mg total) by mouth 2 (two) times daily., Disp: 60 capsule, Rfl: 0    Review of Systems   Constitutional:  Negative for unexpected weight change.   HENT:  Negative for trouble swallowing.    Eyes:  Negative for visual disturbance.   Respiratory:  Negative for shortness of breath.    Cardiovascular:  Negative for chest pain, palpitations and leg swelling.   Gastrointestinal:  Negative for blood in stool.   Genitourinary:  Negative for hematuria.   Skin:  Negative for rash.   Allergic/Immunologic: Negative for immunocompromised state.   Neurological:  Negative for headaches.   Hematological:  Does not bruise/bleed easily.   Psychiatric/Behavioral:  Negative for agitation. The patient is not nervous/anxious.      Objective:      /68 (BP Location: Right arm, Patient Position: Sitting, BP Method: Medium (Manual))   Pulse (!) 59   Temp 98.1 °F (36.7 °C) (Oral)   Ht 5' 11" (1.803 m)   Wt 94.7 kg (208 lb 12.4 oz)   SpO2 (!) 93%   BMI 29.12 kg/m²   Physical Exam  Constitutional:       Appearance: He is well-developed.   Eyes:      Conjunctiva/sclera: Conjunctivae normal.      Pupils: Pupils are equal, round, and reactive to light.   Cardiovascular:      Rate and Rhythm: Normal rate and regular rhythm.      Heart sounds: Normal heart sounds.   Pulmonary:      Effort: Pulmonary effort is normal.   Musculoskeletal:         General: Normal range of motion.   Neurological:      Mental Status: He is alert and oriented to person, place, and time.   Psychiatric:         Behavior: Behavior normal.         Thought Content: " Thought content normal.         Judgment: Judgment normal.       Assessment:       1. 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 Hyperlipidemia associated with type 2 diabetes mellitus    3. Hypertension associated with diabetes    4. Paroxysmal atrial fibrillation    5. Chronic obstructive pulmonary disease, unspecified COPD type    6. Flu vaccine need    7. Overweight (BMI 25.0-29.9)        Plan:       Well controlled type 2 diabetes mellitus with nephropathy  -     metFORMIN (GLUCOPHAGE) 1000 MG tablet; Take 1 tablet (1,000 mg total) by mouth 2 (two) times daily with meals.  Dispense: 180 tablet; Refill: 3  Stable, continue medication  Follow the ADA diet  Hemoglobin A1C   Date Value Ref Range Status   10/28/2022 5.7 (H) 4.0 - 5.6 % Final     Comment:     ADA Screening Guidelines:  5.7-6.4%  Consistent with prediabetes  >or=6.5%  Consistent with diabetes    High levels of fetal hemoglobin interfere with the HbA1C  assay. Heterozygous hemoglobin variants (HbS, HgC, etc)do  not significantly interfere with this assay.   However, presence of multiple variants may affect accuracy.     04/12/2022 5.5 4.0 - 5.6 % Final     Comment:     ADA Screening Guidelines:  5.7-6.4%  Consistent with prediabetes  >or=6.5%  Consistent with diabetes    High levels of fetal hemoglobin interfere with the HbA1C  assay. Heterozygous hemoglobin variants (HbS, HgC, etc)do  not significantly interfere with this assay.   However, presence of multiple variants may affect accuracy.     10/15/2021 5.6 4.0 - 5.6 % Final     Comment:     ADA Screening Guidelines:  5.7-6.4%  Consistent with prediabetes  >or=6.5%  Consistent with diabetes    High levels of fetal hemoglobin interfere with the HbA1C  assay. Heterozygous hemoglobin variants (HbS, HgC, etc)do  not significantly interfere with this assay.   However, presence of multiple variants may affect accuracy.        Hyperlipidemia associated with type 2 diabetes mellitus    "Stable, continue management  Hypertension associated with diabetes  -     lisinopriL (PRINIVIL,ZESTRIL) 20 MG tablet; Take 1 tablet (20 mg total) by mouth once daily.  Dispense: 90 tablet; Refill: 3  Stable, continue medication  Low sodium diet  BP Readings from Last 3 Encounters:   11/04/22 122/68   08/24/22 134/71   08/04/22 130/62     Paroxysmal atrial fibrillation  -     apixaban (ELIQUIS) 5 mg Tab; Take 1 tablet (5 mg total) by mouth 2 (two) times daily.  Dispense: 180 tablet; Refill: 0  Stable, continue medication  Chronic obstructive pulmonary disease, unspecified COPD type  Stable, continue resp inhaler  Flu vaccine need  -     Influenza (FLUAD) - Quadrivalent (Adjuvanted) *Preferred* (65+) (PF)    Overweight (BMI 25.0-29.9)    Counseled patient on his ideal body weight, health consequences of being obese and current recommendations including weekly exercise and a heart healthy diet.  Current BMI is:Estimated body mass index is 29.12 kg/m² as calculated from the following:    Height as of this encounter: 5' 11" (1.803 m).    Weight as of this encounter: 94.7 kg (208 lb 12.4 oz)..  Patient is aware that ideal BMI < 25 or Weight in (lb) to have BMI = 25: 178.9.       Patient readiness: acceptance and barriers:none    During the course of the visit the patient was educated and counseled about the following:     Diabetes:  Educational material distributed.  Addressed ADA diet.  Suggested low cholesterol diet.  Hypertension:   Dietary sodium restriction.  Regular aerobic exercise.    Goals: Diabetes: Maintain Hemoglobin A1C below 7 and Hypertension: Reduce Blood Pressure    Did patient meet goals/outcomes: Yes    The following self management tools provided: declined    Patient Instructions (the written plan) was given to the patient/family.     Time spent with patient: 15 minutes    Barriers to medications present (no )    Adverse reactions to current medications (no)    Over the counter medications reviewed " (Yes)

## 2022-11-28 ENCOUNTER — PATIENT MESSAGE (OUTPATIENT)
Dept: CARDIOLOGY | Facility: CLINIC | Age: 72
End: 2022-11-28
Payer: MEDICARE

## 2022-11-28 ENCOUNTER — TELEPHONE (OUTPATIENT)
Dept: CARDIOLOGY | Facility: CLINIC | Age: 72
End: 2022-11-28
Payer: MEDICARE

## 2022-12-12 ENCOUNTER — PES CALL (OUTPATIENT)
Dept: ADMINISTRATIVE | Facility: CLINIC | Age: 72
End: 2022-12-12
Payer: MEDICARE

## 2022-12-30 ENCOUNTER — PES CALL (OUTPATIENT)
Dept: ADMINISTRATIVE | Facility: CLINIC | Age: 72
End: 2022-12-30
Payer: MEDICARE

## 2023-01-17 ENCOUNTER — OFFICE VISIT (OUTPATIENT)
Dept: FAMILY MEDICINE | Facility: CLINIC | Age: 73
End: 2023-01-17
Payer: MEDICARE

## 2023-01-17 ENCOUNTER — LAB VISIT (OUTPATIENT)
Dept: LAB | Facility: HOSPITAL | Age: 73
End: 2023-01-17
Attending: STUDENT IN AN ORGANIZED HEALTH CARE EDUCATION/TRAINING PROGRAM
Payer: MEDICARE

## 2023-01-17 ENCOUNTER — CLINICAL SUPPORT (OUTPATIENT)
Dept: FAMILY MEDICINE | Facility: CLINIC | Age: 73
End: 2023-01-17
Attending: STUDENT IN AN ORGANIZED HEALTH CARE EDUCATION/TRAINING PROGRAM
Payer: MEDICARE

## 2023-01-17 ENCOUNTER — PATIENT MESSAGE (OUTPATIENT)
Dept: ADMINISTRATIVE | Facility: HOSPITAL | Age: 73
End: 2023-01-17
Payer: MEDICARE

## 2023-01-17 VITALS
BODY MASS INDEX: 29.63 KG/M2 | RESPIRATION RATE: 16 BRPM | DIASTOLIC BLOOD PRESSURE: 62 MMHG | WEIGHT: 211.63 LBS | SYSTOLIC BLOOD PRESSURE: 126 MMHG | HEIGHT: 71 IN | OXYGEN SATURATION: 93 % | HEART RATE: 71 BPM | TEMPERATURE: 98 F

## 2023-01-17 DIAGNOSIS — E11.65 TYPE 2 DIABETES MELLITUS WITH HYPERGLYCEMIA, WITHOUT LONG-TERM CURRENT USE OF INSULIN: ICD-10-CM

## 2023-01-17 DIAGNOSIS — Z12.5 ENCOUNTER FOR PROSTATE CANCER SCREENING: ICD-10-CM

## 2023-01-17 DIAGNOSIS — E11.69 HYPERLIPIDEMIA ASSOCIATED WITH TYPE 2 DIABETES MELLITUS: ICD-10-CM

## 2023-01-17 DIAGNOSIS — T46.6X5A MYALGIA DUE TO STATIN: ICD-10-CM

## 2023-01-17 DIAGNOSIS — I15.2 HYPERTENSION ASSOCIATED WITH DIABETES: ICD-10-CM

## 2023-01-17 DIAGNOSIS — M79.10 MYALGIA DUE TO STATIN: ICD-10-CM

## 2023-01-17 DIAGNOSIS — E78.5 HYPERLIPIDEMIA ASSOCIATED WITH TYPE 2 DIABETES MELLITUS: ICD-10-CM

## 2023-01-17 DIAGNOSIS — I71.9 AORTIC ANEURYSM, UNSPECIFIED PORTION OF AORTA, UNSPECIFIED WHETHER RUPTURED: ICD-10-CM

## 2023-01-17 DIAGNOSIS — F17.219 NICOTINE DEPENDENCE, CIGARETTES, WITH UNSPECIFIED NICOTINE-INDUCED DISORDERS: ICD-10-CM

## 2023-01-17 DIAGNOSIS — E27.8 ADRENAL NODULE: ICD-10-CM

## 2023-01-17 DIAGNOSIS — J44.9 CHRONIC OBSTRUCTIVE PULMONARY DISEASE, UNSPECIFIED COPD TYPE: ICD-10-CM

## 2023-01-17 DIAGNOSIS — M48.061 SPINAL STENOSIS OF LUMBAR REGION, UNSPECIFIED WHETHER NEUROGENIC CLAUDICATION PRESENT: ICD-10-CM

## 2023-01-17 DIAGNOSIS — Z00.00 HEALTHCARE MAINTENANCE: Primary | ICD-10-CM

## 2023-01-17 DIAGNOSIS — I70.0 ATHEROSCLEROSIS OF AORTA: ICD-10-CM

## 2023-01-17 DIAGNOSIS — I48.0 PAROXYSMAL ATRIAL FIBRILLATION: ICD-10-CM

## 2023-01-17 DIAGNOSIS — E11.59 HYPERTENSION ASSOCIATED WITH DIABETES: ICD-10-CM

## 2023-01-17 DIAGNOSIS — I71.9 AORTIC ANEURYSM WITHOUT RUPTURE, UNSPECIFIED PORTION OF AORTA: ICD-10-CM

## 2023-01-17 LAB
ESTIMATED AVG GLUCOSE: 123 MG/DL (ref 68–131)
HBA1C MFR BLD: 5.9 % (ref 4–5.6)

## 2023-01-17 PROCEDURE — 3074F PR MOST RECENT SYSTOLIC BLOOD PRESSURE < 130 MM HG: ICD-10-PCS | Mod: CPTII,S$GLB,, | Performed by: STUDENT IN AN ORGANIZED HEALTH CARE EDUCATION/TRAINING PROGRAM

## 2023-01-17 PROCEDURE — 99999 PR PBB SHADOW E&M-EST. PATIENT-LVL V: ICD-10-PCS | Mod: PBBFAC,,, | Performed by: STUDENT IN AN ORGANIZED HEALTH CARE EDUCATION/TRAINING PROGRAM

## 2023-01-17 PROCEDURE — 1126F AMNT PAIN NOTED NONE PRSNT: CPT | Mod: CPTII,S$GLB,, | Performed by: STUDENT IN AN ORGANIZED HEALTH CARE EDUCATION/TRAINING PROGRAM

## 2023-01-17 PROCEDURE — 1126F PR PAIN SEVERITY QUANTIFIED, NO PAIN PRESENT: ICD-10-PCS | Mod: CPTII,S$GLB,, | Performed by: STUDENT IN AN ORGANIZED HEALTH CARE EDUCATION/TRAINING PROGRAM

## 2023-01-17 PROCEDURE — 99215 PR OFFICE/OUTPT VISIT, EST, LEVL V, 40-54 MIN: ICD-10-PCS | Mod: 25,S$GLB,, | Performed by: STUDENT IN AN ORGANIZED HEALTH CARE EDUCATION/TRAINING PROGRAM

## 2023-01-17 PROCEDURE — 3074F SYST BP LT 130 MM HG: CPT | Mod: CPTII,S$GLB,, | Performed by: STUDENT IN AN ORGANIZED HEALTH CARE EDUCATION/TRAINING PROGRAM

## 2023-01-17 PROCEDURE — 3008F PR BODY MASS INDEX (BMI) DOCUMENTED: ICD-10-PCS | Mod: CPTII,S$GLB,, | Performed by: STUDENT IN AN ORGANIZED HEALTH CARE EDUCATION/TRAINING PROGRAM

## 2023-01-17 PROCEDURE — 99215 OFFICE O/P EST HI 40 MIN: CPT | Mod: 25,S$GLB,, | Performed by: STUDENT IN AN ORGANIZED HEALTH CARE EDUCATION/TRAINING PROGRAM

## 2023-01-17 PROCEDURE — 92228 IMG RTA DETC/MNTR DS PHY/QHP: CPT | Mod: TC,S$GLB,, | Performed by: STUDENT IN AN ORGANIZED HEALTH CARE EDUCATION/TRAINING PROGRAM

## 2023-01-17 PROCEDURE — 1101F PR PT FALLS ASSESS DOC 0-1 FALLS W/OUT INJ PAST YR: ICD-10-PCS | Mod: CPTII,S$GLB,, | Performed by: STUDENT IN AN ORGANIZED HEALTH CARE EDUCATION/TRAINING PROGRAM

## 2023-01-17 PROCEDURE — 3078F DIAST BP <80 MM HG: CPT | Mod: CPTII,S$GLB,, | Performed by: STUDENT IN AN ORGANIZED HEALTH CARE EDUCATION/TRAINING PROGRAM

## 2023-01-17 PROCEDURE — 99407 PR TOBACCO USE CESSATION INTENSIVE >10 MINUTES: ICD-10-PCS | Mod: S$GLB,,, | Performed by: STUDENT IN AN ORGANIZED HEALTH CARE EDUCATION/TRAINING PROGRAM

## 2023-01-17 PROCEDURE — 2025F PR 7 STANDARD FLD STEREO RETINAL PHOTOS W/O EVID OF RETINOPATHY W/INTERPT BY OPHTH/OPT: ICD-10-PCS | Mod: CPTII,S$GLB,, | Performed by: STUDENT IN AN ORGANIZED HEALTH CARE EDUCATION/TRAINING PROGRAM

## 2023-01-17 PROCEDURE — 1159F MED LIST DOCD IN RCRD: CPT | Mod: CPTII,S$GLB,, | Performed by: STUDENT IN AN ORGANIZED HEALTH CARE EDUCATION/TRAINING PROGRAM

## 2023-01-17 PROCEDURE — 36415 COLL VENOUS BLD VENIPUNCTURE: CPT | Mod: PO | Performed by: STUDENT IN AN ORGANIZED HEALTH CARE EDUCATION/TRAINING PROGRAM

## 2023-01-17 PROCEDURE — 1159F PR MEDICATION LIST DOCUMENTED IN MEDICAL RECORD: ICD-10-PCS | Mod: CPTII,S$GLB,, | Performed by: STUDENT IN AN ORGANIZED HEALTH CARE EDUCATION/TRAINING PROGRAM

## 2023-01-17 PROCEDURE — 83036 HEMOGLOBIN GLYCOSYLATED A1C: CPT | Performed by: STUDENT IN AN ORGANIZED HEALTH CARE EDUCATION/TRAINING PROGRAM

## 2023-01-17 PROCEDURE — 1101F PT FALLS ASSESS-DOCD LE1/YR: CPT | Mod: CPTII,S$GLB,, | Performed by: STUDENT IN AN ORGANIZED HEALTH CARE EDUCATION/TRAINING PROGRAM

## 2023-01-17 PROCEDURE — 99999 PR PBB SHADOW E&M-EST. PATIENT-LVL V: CPT | Mod: PBBFAC,,, | Performed by: STUDENT IN AN ORGANIZED HEALTH CARE EDUCATION/TRAINING PROGRAM

## 2023-01-17 PROCEDURE — 84153 ASSAY OF PSA TOTAL: CPT | Performed by: STUDENT IN AN ORGANIZED HEALTH CARE EDUCATION/TRAINING PROGRAM

## 2023-01-17 PROCEDURE — 92228 DIABETIC EYE SCREENING PHOTO: ICD-10-PCS | Mod: 26,S$GLB,, | Performed by: OPTOMETRIST

## 2023-01-17 PROCEDURE — 3288F PR FALLS RISK ASSESSMENT DOCUMENTED: ICD-10-PCS | Mod: CPTII,S$GLB,, | Performed by: STUDENT IN AN ORGANIZED HEALTH CARE EDUCATION/TRAINING PROGRAM

## 2023-01-17 PROCEDURE — 3288F FALL RISK ASSESSMENT DOCD: CPT | Mod: CPTII,S$GLB,, | Performed by: STUDENT IN AN ORGANIZED HEALTH CARE EDUCATION/TRAINING PROGRAM

## 2023-01-17 PROCEDURE — 3008F BODY MASS INDEX DOCD: CPT | Mod: CPTII,S$GLB,, | Performed by: STUDENT IN AN ORGANIZED HEALTH CARE EDUCATION/TRAINING PROGRAM

## 2023-01-17 PROCEDURE — 3078F PR MOST RECENT DIASTOLIC BLOOD PRESSURE < 80 MM HG: ICD-10-PCS | Mod: CPTII,S$GLB,, | Performed by: STUDENT IN AN ORGANIZED HEALTH CARE EDUCATION/TRAINING PROGRAM

## 2023-01-17 PROCEDURE — 92228 IMG RTA DETC/MNTR DS PHY/QHP: CPT | Mod: 26,S$GLB,, | Performed by: OPTOMETRIST

## 2023-01-17 PROCEDURE — 92228 DIABETIC EYE SCREENING PHOTO: ICD-10-PCS | Mod: TC,S$GLB,, | Performed by: STUDENT IN AN ORGANIZED HEALTH CARE EDUCATION/TRAINING PROGRAM

## 2023-01-17 PROCEDURE — 99407 BEHAV CHNG SMOKING > 10 MIN: CPT | Mod: S$GLB,,, | Performed by: STUDENT IN AN ORGANIZED HEALTH CARE EDUCATION/TRAINING PROGRAM

## 2023-01-17 PROCEDURE — 2025F 7 FLD RTA PHOTO W/O RTNOPTHY: CPT | Mod: CPTII,S$GLB,, | Performed by: STUDENT IN AN ORGANIZED HEALTH CARE EDUCATION/TRAINING PROGRAM

## 2023-01-17 NOTE — PROGRESS NOTES
[facility] Note    Subjective:    The HPI and pertinent ROS is included in the Diagnostic Impression Remarks section at the end of the note. Please see below for further details. Chief complaint is at end of note.     Umang is a pleasant intelligent patient who is here for evaluation.     Modified Medications    No medications on file       Data reviewed 274}  Previous medical records reviewed and summarized in plan section at end of note.      If you are due for any health screening(s) below please notify me so we can arrange them to be ordered and scheduled. Most healthy patients at your age complete them, but you are free to accept or refuse. If you can't do it, I'll definitely understand. If you can, I'd certainly appreciate it!     Tests to Keep You Healthy    Eye Exam: ORDERED BUT NOT SCHEDULED  Colon Cancer Screening: Met on 2/11/2019  Last Blood Pressure <= 139/89 (1/17/2023): Yes  Last HbA1c < 8 (10/28/2022): Yes      The following portions of the patient's history were reviewed and updated as appropriate: allergies, current medications, past family history, past medical history, past social history, past surgical history and problem list.    He  has a past medical history of A-fib (1 year), Diabetes mellitus, and Hypertension.  He  has a past surgical history that includes Back surgery; Hernia repair; and Eye surgery.    He  reports that he has been smoking vaping with nicotine and cigarettes. He has been smoking an average of .5 packs per day. He has been exposed to tobacco smoke. He has never used smokeless tobacco. He reports current alcohol use of about 4.0 - 6.0 standard drinks per week. He reports current drug use. Drug: Hydrocodone.  He family history includes Psoriasis in his daughter; Skin cancer in his father.    Review of patient's allergies indicates:   Allergen Reactions    Contrast media        Tobacco Use: High Risk    Smoking Tobacco Use: Every Day    Smokeless Tobacco Use:  "Never    Passive Exposure: Current     Physical Examination  General appearance: alert, cooperative, no distress  Neck: no thyromegaly, no neck stiffness  Lungs: clear to auscultation, no wheezes, rales or rhonchi, symmetric air entry  Heart: normal rate, regular rhythm, normal S1, S2, no murmurs, rubs, clicks or gallops  Abdomen: soft, nontender, nondistended, no rigidity, rebound, or guarding.   Back: no point tenderness over spine  Extremities: peripheral pulses normal, no unilateral leg swelling or calf tenderness   Neurological:alert, oriented, normal speech, no new focal findings or movement disorder noted from baseline    BP Readings from Last 3 Encounters:   01/17/23 126/62   11/04/22 122/68   08/24/22 134/71     Wt Readings from Last 3 Encounters:   01/17/23 96 kg (211 lb 10.3 oz)   11/04/22 94.7 kg (208 lb 12.4 oz)   08/24/22 93 kg (205 lb 0.4 oz)     /62 (BP Location: Right arm, Patient Position: Sitting, BP Method: Large (Manual))   Pulse 71   Temp 98.1 °F (36.7 °C) (Oral)   Resp 16   Ht 5' 11" (1.803 m)   Wt 96 kg (211 lb 10.3 oz)   SpO2 (!) 93%   BMI 29.52 kg/m²    274}  Laboratory: I have reviewed old labs below:    274}    Lab Results   Component Value Date    WBC 6.10 10/28/2022    HGB 14.5 10/28/2022    HCT 44.6 10/28/2022    MCV 90 10/28/2022     10/28/2022     10/28/2022    K 4.8 10/28/2022     10/28/2022    CALCIUM 9.3 10/28/2022    PHOS 3.8 09/14/2021    CO2 27 10/28/2022     (H) 10/28/2022    BUN 14 10/28/2022    CREATININE 1.2 10/28/2022    ANIONGAP 10 10/28/2022    PROT 6.8 10/28/2022    ALBUMIN 3.7 10/28/2022    BILITOT 0.4 10/28/2022    ALKPHOS 67 10/28/2022    ALT 10 10/28/2022    AST 11 10/28/2022    CHOL 138 10/28/2022    TRIG 237 (H) 10/28/2022    HDL 30 (L) 10/28/2022    LDLCALC 60.6 (L) 10/28/2022    TSH 2.077 12/17/2018    PSA 3.2 10/15/2021    HGBA1C 5.7 (H) 10/28/2022     Lab reviewed by me: Particular labs of significance that I will " "monitor, workup, or treat to improve are mentioned below in diagnostic impression remarks.    Imaging/EKG: I have reviewed the pertinent results and my findings are noted in remarks.  274}    CC:   Chief Complaint   Patient presents with    Establish Care    Diabetes    Hypertension    Hyperlipidemia        274}    Assessment/Plan  Umang Goldberg is a 72 y.o. male who presents to clinic with:  1. Healthcare maintenance    2. Chronic obstructive pulmonary disease, unspecified COPD type    3. Paroxysmal atrial fibrillation    4. Spinal stenosis of lumbar region, unspecified whether neurogenic claudication present    5. Type 2 diabetes mellitus with hyperglycemia, without long-term current use of insulin    6. Hypertension associated with diabetes    7. Hyperlipidemia associated with type 2 diabetes mellitus    8. Encounter for prostate cancer screening    9. Nicotine dependence, cigarettes, with unspecified nicotine-induced disorders    10. Aortic aneurysm, unspecified portion of aorta, unspecified whether ruptured    11. Adrenal nodule    12. Aortic aneurysm without rupture, unspecified portion of aorta    13. Myalgia due to statin    14. Atherosclerosis of aorta       274}  Diagnostic Impression Remarks + HPI     Documentation entered by me for this encounter may have been done in part using speech-recognition technology. Although I have made an effort to ensure accuracy, "sound like" errors may exist and should be interpreted in context.     COPD-stable no recent shortness of breath recommend smoking cessation  Nicotine dependence-needs improved recommend smoking cessation offered multiple medications he is not want to try any discussed risk and benefits of lung cancer screening he smoked for over 20 years pack per day still smoking wants to get a CT scan lungs will get this     Aortic aneurysm-stable recommend smoking cessation pressure control will get additional imaging monitoring it will put in vascular referral " no tearing or ripping pain no pulse deficit     Hypertension well controlled continue current meds   Hyperlipidemia well controlled continue statin   Adrenal nodule-etiology unclear another CT scan get some blood work recommend see endocrinology   AFib stable continue anticoagulation and current medications no recent flares    Atherosclerosis of aorta- stable continue statin and rec healthy diet monitor follow lipid levels     Health maintenance-well she will get PSA after shared decision-making will follow-up on blood work     Diabetes-stable continue metformin recommend low glycemic index diet recommend eye exam follow-up blood work  Assistance with smoking cessation was offered, including:  [x]  Medications  [x]  Counseling  []  Printed Information on Smoking Cessation  []  Referral to a Smoking Cessation Program    Patient was counseled regarding smoking for >10 minutes.    Altogether 41 minutes of total time spent on the encounter, which includes face to face time and non-face to face time preparing to see the patient (eg, review of tests), Obtaining and/or reviewing separately obtained history, Documenting clinical information in the electronic or other health record, Independently interpreting results (not separately reported) and communicating results to the patient/family/caregiver, or Care coordination (not separately reported). I also counseled him on common and the most usual side effect of prescribed medications. Risk and benefits of the medication were also discussed. I reviewed previous notes to better coordinate patient's care. He test results and lifestyle changes were also discussed. All questions were answered, and patient voiced understanding.     This is the extent of this pleasant patient's concerns at this present time. He did not feel chest pain upon exertion, dyspnea, nausea, vomiting, diaphoresis, or syncope. No pleuritic chest pain, unilateral leg swelling, calf tenderness, or calf pain.  Negative for unintentional weight loss night sweats and fevers. Umang will return to clinic in a few months for further workup and reassessment or sooner as needed. He was instructed to call the clinic or go to the emergency department or urgent care immediately if his symptoms do not improve, worsens, or if any new symptoms develop. As we discussed that symptoms could worsen over the next 24 hours he was advised that if any increased swelling, pain, or numbness arise to go immediately to the ED. Patient knows to call any time if an emergency arises. Shared decision making occurred and he verbalized understanding in agreement with this plan. I discussed imaging findings, diagnosis, possibilities, treatment options, medications, risks, and benefits. He had many questions regarding the options and long-term effects. All questions were answered. He expressed understanding after counseling regarding the diagnosis and recommendations. He was capable and demonstrated competence with understanding of these options. Shared decision making was performed resulting in him choosing the current treatment plan. Patient handout was given with instructions and recommendations. Advised the patient that if they become pregnant to alert us immediately to assess for medication changes. I also discussed the importance of close follow up to discuss labs, change or modify his medications if needed, monitor side effects, and further evaluation of medical problems.     Additional workup planned: see labs ordered below.    See below for labs and meds ordered with associated diagnosis      1. Healthcare maintenance    2. Chronic obstructive pulmonary disease, unspecified COPD type    3. Paroxysmal atrial fibrillation    4. Spinal stenosis of lumbar region, unspecified whether neurogenic claudication present    5. Type 2 diabetes mellitus with hyperglycemia, without long-term current use of insulin  - Diabetic Eye Screening Photo; Future  -  Hemoglobin A1C; Future    6. Hypertension associated with diabetes    7. Hyperlipidemia associated with type 2 diabetes mellitus    8. Encounter for prostate cancer screening  - PSA, Screening; Future    9. Nicotine dependence, cigarettes, with unspecified nicotine-induced disorders  - CT Chest Lung Screening Low Dose; Future    10. Aortic aneurysm, unspecified portion of aorta, unspecified whether ruptured    11. Adrenal nodule  - Ambulatory referral/consult to Endocrinology; Future  - CT Abdomen Pelvis  Without Contrast; Future  - Cortisol, Saliva; Future  - Metanephrines, Plasma Free; Future  - Aldosterone/Renin Activity Ratio; Future  - Aldosterone; Future  - Renin; Future    12. Aortic aneurysm without rupture, unspecified portion of aorta  - Ambulatory referral/consult to Vascular Surgery; Future  - CT Abdomen Pelvis  Without Contrast; Future    13. Myalgia due to statin    14. Atherosclerosis of aorta      Larry Thompson MD   274}    If you are due for any health screening(s) below please notify me so we can arrange them to be ordered and scheduled. Most healthy patients at your age complete them, but you are free to accept or refuse.     If you can't do it, I'll definitely understand. If you can, I'd certainly appreciate it!   Tests to Keep You Healthy    Eye Exam: ORDERED BUT NOT SCHEDULED  Colon Cancer Screening: Met on 2/11/2019  Last Blood Pressure <= 139/89 (1/17/2023): Yes  Last HbA1c < 8 (10/28/2022): Yes

## 2023-01-17 NOTE — PROGRESS NOTES
Umang Goldberg is a 72 y.o. male here for a diabetic eye screening with non-dilated fundus photos per Dr. Larry Thompson.    Patient cooperative?: Yes  Small pupils?: Left eye = No Right eye=Yes  Last eye exam: 8/2021    For exam results, see Encounter Report.

## 2023-01-17 NOTE — PATIENT INSTRUCTIONS
Vignesh Heck, Please read below to learn more on healthy habits.  Most of my patients read it.       If you are due for any health screening(s) below please notify me so we can arrange them to be ordered and scheduled. Most healthy patients at your age complete them, but you are free to accept or refuse.     If you can't do it, I'll definitely understand. If you can, I'd certainly appreciate it!     Tests to Keep You Healthy    Eye Exam: ORDERED BUT NOT SCHEDULED  Colon Cancer Screening: Met on 2/11/2019  Last Blood Pressure <= 139/89 (1/17/2023): Yes  Last HbA1c < 8 (10/28/2022): Yes                 Diabetic Retinal Eye Exam    Diabetes is the #1 cause of blindness in the [place] - early detection before signs or symptoms develop can prevent debilitating blindness.    Once-a-year screening is recommended for all diabetic patients. This exam can prevent and treat diabetes complications in the eye before developing symptoms. This can be done with a special camera is used to take photographs of the back of your eye without having to dilate them, or you can see an eye doctor for a full dilated exam.    Although you are still overdue for this important screening, due to the COVID-19 pandemic, we recommend scheduling it in the near future.      If you would like to quit smoking:  You may be eligible for free services if you are a [place] resident and started smoking cigarettes before September 1, 1988.  Call the [facility] toll free at [phone] or [phone].     Call 1-800-QUIT-NOW if you do not meet the above criteria.                 Table of Contents:     Cancer prevention  Explanation on the different components of your blood work and interpretation  Frequently asked questions   Blood pressure log   E-Visits  E-Consults     Cancer Prevention    Why should you choose to get screened for cancer? One simple reason is because you are important. You matter and deserve to have the best health so  "you can fulfill your great potential.     Colon Cancer screening - Most colon cancers can be prevented by screening. In most cases a polyp in the colon can grow and is not cancerous at first, but it can become cancerous years later. A polyp is like a seed that can grow into a weed if it is left in the soil. If the seed is detected and removed, no weed sprouts. A FIT test/Cologuard test and colonoscopy can detect precancerous polyps and lead to the prevention of cancer. A polyp is just like a seed that can be removed before the roots take hold. A colonoscopy can remove these polyps and eliminate the chance that these polyps turn into cancer.     Cervical Cancer screening- A PAP smear can detect precancerous cells that can become cervical cancer and can lead to procedures to remove them. It is very important to get a PAP smear as investing these few minutes can help prevent a lot of trouble down the road. If you want to do the most you can do to spend more time with your family and friends', cancer screenings can help with that goal.     Breast Cancer screening- Mammograms can detect breast cancer before it has spread and early detection allows the ability to remove the lesion prior to any spread. It is similar to a small rotten spot on fruit. If the spoiled part is removed quickly it can preserve the rest of the fruit, but if it is left alone it will corrupt the whole peach.     Smoking Cessation- "Smoking is like a chimney. The tar builds up and causes a back draft which leads to a cough. Smoking is like sitting in a car with a blocked exhaust system." Smoking can increase your risk for lung, mouth, throat, nose, esophagus, bladder, kidney, ureter, pancreas, stomach, liver, cervix, ovary, bowel, and leukemia [2]. If you have smoked in the past, you might meet the criteria for a CT lung cancer screening.     Lung Cancer Screening - "The U.S. Preventive Services Task Force (USPSTF) recommendsexternal icon yearly lung " "cancer screening with LDCT for people who--have a 20 pack-year or more smoking history, and smoke now or have quit within the past 15 years, and are between 50 and 80 years old. A pack-year is smoking an average of one pack of cigarettes per day for one year. For example, a person could have a 20 pack-year history by smoking one pack a day for 20 years or two packs a day for 10 years." [3]    Hypertension - Common high blood pressure meds may lower colorectal cancer risk-ALONZO, July 6, 2020 - Hypertension Journal Report Medications commonly prescribed to treat high blood pressure may also reduce patients' colorectal cancer risk, according to new research published today in Hypertension, an American Heart Association journal." [4].     Low HbA1c - "Higher HbA1c levels (within both the non?diabetic and diabetic ranges) were associated with the risk of colorectal cancer (Model 2; P linear?=?0.009), especially for colon cancer." [5].    A healthy diet, exercise, limitation of alcohol use, certain infections, avoidance of radiation/environmental toxins, and staying lean lowers your cancer risk [6].     I want to empower you to make informed choices for your health. I have a listed below the most common blood components that we check for when you get blood work. I discuss what each component measures along with common reasons for why they can be abnormal. If you are interested in understanding your blood work better, please read the lab explanation attached below.     Explanation of Lab Results    Please note: This information is included as a reference to help you better understand your lab results and is not be used for diagnosis.     Lipid Panel:  Cholesterol is a measure of cardiac risk and stroke risk. A lipid panel measures total cholesterol and provides readings which are broken down into 3 subsections: Triglycerides, HDL and LDL.    Total Cholesterol: Your total blood cholesterol is a measure of LDL cholesterol, " HDL cholesterol, and other lipid components.  If your individual lipid level components are in the normal range and you have an elevated total cholesterol level we are generally not to concerned since we primarily look at the LDL cholesterol which is considered the bad cholesterol which can lead to heart attacks and strokes.  Your calculated cardiac risk is the most important factor in deciding if you would benefit from a cholesterol-lowering medication that the total cholesterol level.    Triglycerides are most diet and weight sensitive. They are affected easily by lifestyle changes. Ideally, this reading should be less than 150, although if the remainder of the cholesterol panel is within normal limits, we will tolerate upwards of 250-300. For the most part, if triglycerides are the only part of your cholesterol panel reading as 'abnormal', it is best to lose weight and modify your diet, including less saturated fat and less simple sugars. We only start medication to lower this is the level is greater than 500 since this can increase ones risk for pancreatitis. This is not the cholesterol type that leads to heart attacks.     HDL is your 'good cholesterol.' Ideally, the reading should be over 40 and is particularly helpful when it is greater than 60. Research indicates that high HDL is extremely protective; and if your HDL level is greater than 60, we tolerate far worse LDL or triglyceride levels. For the most part, HDL is responsive to aerobic activity and ideal weight. We do not have medicines that increase the HDL reading very easily.    LDL is your 'bad cholesterol.' Our goals depend on how many cardiac risk factors you have.     High LDL: If you are diabetic or have had a heart attack, we like the LDL level to be less than 100. If you have 2 cardiac risk factors (such as diabetes, hypertension, family history, a low HDL and smoking), we like your LDL to be less than 130. If you have 0-2 cardiac risk factors,  we like your LDL level to be less than 160, but we may not necessarily initiate medications to 190 unless you cannot lower it. The latest guidelines recommend to consider taking a statin only if your ASCVD cardiac risk score is at least greater than 7.5% and a strong recommendation if your risk score is greater than 10%.     Low LDL: Normal or low LDL is considered good and having an LDL below the normal range is not of a concern.     Complete Blood Count (CBC):    White blood cells: These are infection fighting cells. Mild fluctuations may represent minor illness at the time of blood draw. Marked fluctuations can represent immune diseases. This can also be elevated from smoking.     High WBC: Elevation in wbc can commonly be caused by an infection, steroid use, or any cause of inflammation such as many rheumatologic diseases, surgery, or trauma.      Low WBC: Many different things can cause this such as infections, medications, rheumatologic disorders, malignancies, nutritional deficiencies, and a normal variant in some individuals. If this occurs it is common practice to rule out a few viruses such as HIV, Hep C, B12, folate along with a a peripheral blood smear to look for abnormalities. If you are otherwise healthy with no concerning symptoms and the workup is negative we usually monitor it with yearly blood work.  If there are other abnormal cell line abnormalities sometimes we refer to hematology to further evaluate.    Hemoglobin: A key indicator for anemia. Ranges depend on age. If you are significantly out of this range, we may need to talk with you.    High Hemoglobin: This can be elevated in some blood disorders, sleep apnea, chronic lung disease, kidney disease, testosterone use, dehydration, smoking, along with some other rare causes.     Low Hemoglobin: Many things can lead to this but the most common cause is an iron deficiency in females who lose blood during their periods, decreased absorption due  to antacids, poor diet, sickle cell, anemia of chronic disease, malignancy, bleeding from the gut, along with some other less common causes. If you are a young female who has periods it is usually assumed that this is from that blood loss unless other symptoms or abnormal blood work is present. If you are above 45 and have an iron deficiency it is always recommended to get a colonoscopy to ensure that there is no lesion causing blood loss and to exclude malignancy.     Hematocrit: Another way of looking at anemia, much like your hemoglobin. If you are significantly out of this range, we may need to talk with you.    MCV: This looks at the size of your red blood cells.     High MCV:  A large number may indicate a B-12 deficiency, folate deficiency, alcohol use, liver disease, medication-induced phenomenon, or a need for further workup.    Low MCV: A small number may imply iron anemia or genetic disorder such as alpha-thalassemia. We may check iron studies called to see if you are low.    MCH: Much like MCV above.    Platelets: These are clotting factors. We tolerate a broad range in this. If your numbers are less than 100, we may need to work it up.     High Platelet Count: If your numbers are elevated, this could represent ongoing inflammation within the body which can be caused by any infection, illness, iron deficiency, or other malignancy. We usually recheck it to monitor for improvement and if it does not resolve will work it up with more blood work.     Low Platelet Count: Many things can cause this such as infections, alcohol use, medications, liver disease, vitamin deficiencies, aspleenia, and some other rare blood disorders. If it persists many times we refer to hematology if a cause is not identified.     Iron:  This is not a reliable blood marker since this fluctuates throughout the day and if you are fasting many times your iron level in your blood is low but this does not necessarily mean you have a  deficiency.  There are more reliable ways to measure your iron stores and these are discussed below.    Transferrin:  Many things can cause an abnormal result and this is not as important as some other labs mentioned below.    TIBC:  This is the total iron-binding capacity and this measures the total amount of iron that can be bound by proteins in the blood.  If you have an elevated TIBC this is a good marker that you could be low on iron and this is useful blood marker.  A simple way to think of this is seats in a car. The TIBC is the number of open seats that iron can sit in. If you have a high TIBC (number of open seats) that means you have a low iron level.     Ferritin:  A low ferritin is almost always caused from an iron deficiency.  A normal ferritin level does not need necessarily exclude an iron deficiency since many inflammatory conditions such as kidney disease, diabetes, arthritis, and obesity can raise this level hiding an iron deficiency.  If you are healthy with no inflammatory conditions this is a very reliable test for detecting an iron deficiency since nothing else lowers your ferritin level except a low iron level. Keep in mind that you can have an iron deficiency if your ferritin level is normal but your TIBC is elevated.  If you have a low iron level the next step is always to identify why you have a low iron level.    CMP (Comprehensive Metabolic Panel):  Broadly, this is a test of organ function including the kidneys, liver, and electrolyte levels.    Glucose: This is primarily looking for diabetes. We like your blood glucose level to be less than 100 when fasting. Readings of 100-125 indicate what we call 'pre-diabetes' or 'glucose intolerance.' This does not necessarily indicate diabetes, but we may check another test called a hemoglobin A1C for confirmation. This level puts you at great risk for becoming a true diabetic and we would encourage the reduction of simple sugars and processed  white flour as well as appropriate weight loss. If this number is greater than 125, it is likely you are diabetic; we will get an additional hemoglobin A1C test and will likely schedule you for an appointment. If you notice that your blood glucose is above 125 and we have not scheduled a follow-up appointment, please call us. Patients who are known diabetics can have readings greater than 125.    Urea Nitrogen: This is a kidney function and maybe elevated because of mild dehydration or because of excessive muscle breakdown from aggressive exercise habits.    Creatinine: This also is a kidney function test. It may be mildly elevated if you have particularly large muscle bulk or taking a supplement like creatine. It is related to the GFR. It is a muscle product that we track to look at your kidney function. If this is elevated and new, we may need to talk with you. If you have had this mildly elevated in the past, it is likely that we will just track it to ensure that it does not worsen quickly. Some medications may gently worsen this, namely blood pressure pills called ace inhibitors. To some degree, we will permit levels of up to 1.6.    Sodium: This is essentially the concentration of salt within the body. This may be mildly low because of dehydration, overhydration, diuretics, .    Potassium: This is an electrolyte that can cause muscular cramping or cardiac difficulties. It is sometimes lowered by diuretic medications.    Chloride: For the most part, this is only relevant if the other electrolytes are abnormal.    CO2: This is a function of acid balance within the body. For the most part, mild abnormalities are not important and may represent a starvation or dehydration state when blood was drawn. Many medications can change this level as well such as diuretics, acetazolamide, calcium carbonate, laxatives, aspirin, and many others.    High CO2: Many things can cause this which includes dehydration, sleep apnea,  and COPD.     Low CO2: Many things can lead to a low level and if you are generally healthy we are usually  not concerned. Diarrhea, renal disease, diabetes, and many medications can cause this.     Anion Gap: Only relevant if your CO2 is abnormal.    Calcium: This is not related to dietary intake of calcium. It may fluctuate gently based on the amount of protein within your body. If it is above 10.9, we may need to do additional testing. Many times if low the albumin level is low and once the corrected calcium level is calculated the calcium is in a normal range.     Total protein: This looks at protein within the body. Markedly elevated levels can represent an immune response and may require further workup.    Albumin: This may be elevated because of particularly high level of fitness. If it is markedly suppressed, it may represent organ dysfunction, particularly in the liver or kidneys.    AST and ALT: These are enzymes in the liver and if elevated can indicate liver damage.     Elevated AST/ALT: Many things can caused elevated liver enzymes and the most common reason is viral infections, alcohol use, medications, supplements, autoimmune, along with some other rare causes. One of the most common reasons for a mild elevation in liver enzymes (less than 3 times the upper limit) is fatty liver disease. Many individuals who have extra fat in their diet store this in the liver and this can build up and cause a mild elevation. This is usually diagnosed with a liver ultrasound and exclusion of other causes. The only treatment for this is diet and exercise along with avoidance of liver toxic medications and alcohol. It is standard of care to rule our viral hepatitis and get imaging of the liver if elevated. This is monitored and if I feel concerned will refer to hepatology.     Alk Phos: Part of liver function or bones    High Alk Phos: elevated levels may indicate a liver injury or obstruction of bile flow. Elevated  levels can also be seen in vitamin D deficiency, drug induced, or bone disorders.     Low Alk Phos: In most cases having a low Alkaline Phosphatase enzyme activity is not due to any disease and is simply a normal variant.  Having an elevated Alk Phos is more concerning and associated with many diseases and thus I would not be overly concerned with a low level which is seen in many health individuals. The reasons for a low serum alkaline phosphatase activity were reviewed in a 1-yr retrospective study and in this study it was found that no cause was found in most cases.  Low activity values were recorded in several individuals in the absence of any obvious cause. This would suggest that the definition of the lower limit of the reference range for alkaline phosphatase is arbitrary, thus limiting the use of low serum activity as a marker of disease.  In some cases micronutrients like Zinc (Zn) and Magnesium (Mg) are causes of low ALP activity and you can take zinc or magnesium supplements. Unfortunately, the blood work to measure zinc and magnesium levels are unreliable and not very accurate since it does not test for the intracellular zinc or magnesium levels.     Kendrick FOSTERD, Eren LLANOS, James HERMOSILLO. Clinical significance of a low serum alkaline phosphatase. Net J Med. 1992 Feb;40(1-2):9-14. PMID: [id number].  Alfonso C. S, Matthew B, Dian I, Behera S, Alfonso S. Low Alkaline Phosphatase (ALP) In Adult Population an Indicator of Zinc (Zn) and Magnesium (Mg) Deficiency. Curr Res Nutr Food Sci 2017;5(3). doi : http://dx.[email or web address]    Total Bilirubin: This is also part of liver function.    High T Bili: If you have right upper quadrant pain an elevation in total bilirubin can be caused by a gallbladder stone that is blocking the biliary tract that leads to your gastrointestinal tract. If you have fever and right upper quadrant pain this can sometimes be elevated when your gallbladder is infected and most  individuals have nausea with vomiting associated with it. If you have no symptoms and are otherwise healthy this can be caused by Gilbert syndrome which is a benign normal variant. There are other causes such as some anemias but there would be abnormal blood counts.     Low T Bili: Nothing to be concerned about.     Thyroid Stimulating Hormone (TSH): This reading is an indicator of your thyroid function. The thyroid regulates energy levels throughout the entire body, affecting almost every organ system. This is an inverse relationship so a high number actually presents a low thyroid. If this is abnormal, we will often check an additional lab called a Free T4 to evaluate this more carefully. Borderline elevations, those of 5-10, can be watched or worked up further. Please do not take the supplement Biotin for at least a week prior to getting your TSH checked since this can lead to false measurement levels.     Prostate Specific Antigen (PSA): (Men only.) This is a prostate cancer screening test, and is no longer a routine screening test. Levels are truly a function of age. Being less than 4 is typical for someone more in their 60s. If you are young, it should probably be less than 3. A higher PSA result does not necessarily mean that you have cancer, but may indicate a need for a discussion with your provider. Options include observation to look at rate of rise or prostate biopsy. Not only is the absolute value important, but how much it has changed from previous years. Please ensure that there is not a dramatic rise from previous years.    Please Note: This information is included as a reference to help you better understand your lab results and is not be used for diagnosis.    Frequently asked questions    When should I take my blood pressure medication?    The latest studies show that taking your blood pressure medication at night is the best time. A recent study showed that this prevents more heart attacks and  "strokes. See the answer below from [place].     "Q. I've taken blood pressure medicines every morning for many years, and they keep my pressure under control. Recently, my doctor recommended taking them at bedtime, instead. Does that make sense?    A. It actually does make sense -- based on recent research. For many years, there have been at least three theoretical reasons for taking blood pressure medicines before bedtime. First, a body system that strongly affects blood pressure, called the renin-angiotensin system, has its peak activity during sleep. Second, circadian rhythms cause differences in the body chemistry at night compared with daytime. Third, most heart attacks occur in the morning, before medicines taken in the morning have a chance to "kick in."" [6].     When should I take my cholesterol medication?    It used to be recommended to take your cholesterol medication at night since the original statins that lowered cholesterol did not last 24 hours and most cholesterol synthesis is done at night. The long acting statins such as atorvastatin and rosuvastatin last 24 hours so they can be taken any time during the day. Simvastatin, pravastatin, fluvastatin, and lovastatin are shorter acting and should be taken at bed time.     Can supplements affect my blood work?    Yes they can. A very important supplement to not take for at least a week prior to your blood work is biotin. "Biotin supplement use is common and can lead to the false measurement of thyroid hormone in commonly used assays." [8.]    What are conditions that should not be addressed during a virtual visit?    There are some conditions that should not be evaluated via a virtual visit since optimal care is impossible. Chest pain, shortness of breath, lung conditions, abdominal pain, and any neurological complaint such as headache, dizziness, numbness ect.         When will I get commentary of my blood work?    I review all blood work that you get " and I will send out commentary on this via [email or web address] within 72 hours. In most cases you will get a message from me sooner, but many times not all of the blood work is completed thus I usually wait until all results have returned. If there is a critical abnormality you should be contacted the same day you got blood work.     How frequently do I need to have visits to get controlled substances?    It is standard protocol to have a visit every 3 months if you are taking scheduled substances such as ADHD medications, psychiatric medications, and pain medications. This is to ensure your safety and monitor for any side effects.     When should I bring prior to a visit if I want to lose weight?    I recommend that you make a food diary for a week and fill out what you ate each day. You can bring this form in to your visit and I can look over it to suggest changes that you can make.     Which over the counter medications should I avoid if I have decreased kidney function?    NSAIDs which includes ibuprofen (Advil, Motrin, Nuprin), naproxen (Aleve), meloxicam (Mobic) and diclofenac(Zorvolex, Voltaren) and ketorolac (Toradol) can damage your kidneys if you take this long term.Tylenol does not affect your kidney and thus is safe as long as you don't have liver disease.     Is there an [facility]?     Yes! The [facility] is located on the first floor of the [facility]. The address is listed below. You can get curbside pickup if you call their number at [phone]. One of the many benefits of using the [facility] pharmacy is that the pharmacists can contact me directly if a cheaper alternative is available to save you money. They also see your note to know more about what the medication was prescribed for. I recommend this pharmacy since communication with me is quick in case any confusion arises on your medications.     [facility].  [address] [place] [id number]  Phone: [phone]    Hours:  Monday -  Friday  8:00 a.m. - 5:30 a.m.    Why is it not in my best interest to call in order to get an antibiotic?    Medicine is a complex field and many times the correct diagnosis is critical in order to provide the correct care. One of the most important goals of a healthcare provider is to ensure that no dangerous condition is masquerading as a mild illness. Specific questions are very important to obtain during an examination that provide a wealth of information to understand your illness. Health care providers are trained to investigate for signs that can be dangerous to your health. Messaging or calling the office in order to get an antibiotic can be very dangerous.     For example, many urinary tract infections can lead to an infection in the kidney that can result in a serious blood stream infection that can lead to hospitalization if not recognized. A cough can be caused by many different things and not necessarily an infection. It is not uncommon that one assumes a cough is from an infection when it is actually caused by a blood clot in the lungs. This can lead to death. Determining your risk can only be performed after a thorough history and examination. A few sentences through e-mail is not enough.     What are some common symptoms that should be evaluated by the emergency department and not by phone or e-mail?    This does not include every symptom, but common examples of symptoms that should prompt one to go to the emergency department are chest pain, chest pressure, shortness of breath, difficulty breathing, abdominal pain, weakness or numbness or an extremity, sudden weakness or drooping on one side of the body, speaking difficulty, unusual or bad headache (particularly if it started suddenly), head injury, confusion, seizure, passing out, lightheaded, pain in arm or jaw, suddenly not able to speak, see, walk, or move, dizziness, neck stiffness, suicidal thoughts, testicular pain, cuts and wounds, severe  pain, along with many others. This is not an inclusive list.     Outside Records    It is common to have an colonoscopy, mammogram, PAP smear, or eye exam done outside the [facility] system. Many times we do not get the records automatically sent to us. Please call your provider's office to notify them to fax us your records so that we can have the most up to date information. Your provider will review your outside results in order to provide you with the complete care that you deserve. We appreciate that you decided to choose us to be serving on your healthcare team and the more information we have about your health is essential.     If I have a psychiatric crisis what should I do?    If you ever feel that there is a risk of a harm to yourself we recommend to go to the emergency room. There is a National Suicide Prevention lifeline number 0-767-489-TALK which route you to the nearest crisis center. There is also a suicide hotline 8-069-MPNAECG (1-466.156.9787).    988 has been designated as the new three-digit dialing code that will route callers to the National Suicide Prevention Lifeline (now known as the 988 Suicide & Crisis Lifeline), and is now active across the [place].    When people call, text, or chat 988, they will be connected to trained counselors that are part of the existing Lifeline network. These trained counselors will listen, understand how their problems are affecting them, provide support, and connect them to resources if necessary.    The previous Lifeline phone number (5-203-195-6360) will always remain available to people in emotional distress or suicidal crisis.    The [employer] network of over 200 crisis centers has been in operation since 2005, and has been proven to be effective. Its the counselors at these local crisis centers who answer the contacts the Lifeline receives every day. Numerous studies have shown that callers feel less suicidal, less depressed, less overwhelmed and  more hopeful after speaking with a Lifeline counselor.     E-Visits    E-Visits are currently available for three conditions: Urinary tract infections, Sinus symptoms, and rashes.     If you have one of these infections or rash you can fill out a questionnaire that will be sent to your provider who can review and send an appropriate medication. No visit is needed.      What is an E-Visit?    An E-Visit is a way to get care for certain conditions without needing to schedule a virtual visit or to come into the clinic. We'll ask you some clinically based questions about yourself and your symptoms and your provider will evaluate, make a diagnosis and respond with a care plan with recommendations including testing and medications as indicated, just as they would in an in-person setting.  If it becomes clear that you need to be seen virtually or in-person after the E-Visit, we can arrange that.         Should I use an E-Visit?    E-Visits should be used only for non-urgent medical conditions. If you need urgent medical care, please contact your clinic by phone, schedule a same-day appointment online or find a nearby [facility]. For serious medical emergencies, please call 911 immediately.         What to expect during an E-Visit   Once you have agreed to an E-Visit, you will be prompted to complete the E-Visit clinical questionnaire in [employer], which can take 10-20 minutes to complete. You may also be asked to confirm your medications.     Since this is a medical evaluation, it is billable to your insurance. Because this is a billable visit, you may also be asked for your insurance details and to enter your credit card information, just as you would for any other visit or co-pay. Much of this is stored in your account, so it should be easy to access or update if needed. You may receive a bill for this service, including any applicable copay amount, after the service is rendered.     Please allow up to 24 business  "hours for a reply. At any point in the E-Visit process, if you have not received a response within 72 hours, please call your clinic.        To initiate the E-Visit process in [facility], click here.       E-Consults    E-Consults are available when you need to have a specialists opinion on one thing and your PCP agrees to send an E-Consult to answer your question within 48 hours. This not only saves you time but money as well since a visit is not always needed.          Patient Education       Checking Your Blood Pressure at Home   The Basics   Written by the doctors and editors at [facility]   How is blood pressure measured? -- Blood pressure is usually measured with a device that goes around your upper arm. This is often done in a doctor's office. But some people also check their blood pressure themselves, at home or at work.  Blood pressure is explained with 2 numbers. For instance, your blood pressure might be "140 over 90." The first (top) number is the pressure inside your arteries when your heart is concha. The second (bottom) number is the pressure inside your arteries when your heart is relaxed. The table shows how doctors and nurses define high and normal blood pressure (table 1).  If your blood pressure gets too high, it puts you at risk for heart attack, stroke, and kidney disease. High blood pressure does not usually cause symptoms. But it can be serious.  What is a home blood pressure meter? -- A home blood pressure meter (or "monitor") is a device you can use to check your blood pressure yourself. It has a cuff that goes around your upper arm (figure 1). Some devices have a cuff that goes around your wrist instead. But doctors aren't sure if these work as well. The meter also has a small screen, or dial, that shows your blood pressure numbers.  There are also special meters you can wear for a day or 2. These are different because they automatically check your blood pressure throughout the day and " night, even while you are sleeping. If your doctor thinks you should use one of these devices, they will talk to you about how to wear it.  Why do I need to check my blood pressure at home? -- If your doctor knows or suspects that you have high blood pressure, they might want you to check it at home. There are a few reasons for this. Your doctor might want to look at:  Whether your blood pressure measures the same at home as it did in the doctor's office  How well your blood pressure medicines are working  Changes in your blood pressure, for example, if it goes up and down  People who check their own blood pressure at home usually do better at keeping it low.  How do I choose a home blood pressure meter? -- When choosing a home blood pressure meter, you will probably want to think about:  Cost - Some devices cost more than others. You should also check to see if your insurance will help pay for your device.  Size - It's important to make sure the cuff fits your arm comfortably. Your doctor or nurse can help you with this.  How easy it is to use - You should make sure you understand how to use the device. You also need to be able to read the numbers on the screen.  You do not need a prescription to buy a home blood pressure meter. You can buy them at most pharmacies or over the internet. Your doctor or nurse can help you choose the right device for you.  How do I check my blood pressure at home? -- Once you have a home blood pressure meter, your doctor or nurse should check it to make sure it fits you and works correctly.  When it's time to check your blood pressure:  Go to the bathroom and empty your bladder first. Having a full bladder can temporarily increase your blood pressure, making the results inaccurate.  Sit in a chair with your feet flat on the ground.  Try to breathe normally and stay calm.  Attach the cuff to your arm. Place the cuff directly on your skin, not over your clothing. The cuff should be tight  enough to not slip down, but not uncomfortably tight.  Sit and relax for about 3 to 5 minutes with the cuff on.  Follow the directions that came with your device to start measuring your blood pressure. This might involve squeezing the bulb at the end of the tube to inflate the cuff (fill it with air). With some monitors, you just need to press a button to inflate the cuff. When the cuff fills with air, it feels like someone is squeezing your arm, but it should not hurt. Then you will slowly deflate the cuff (let the air out of it), or it will deflate by itself. The screen or dial will show your blood pressure numbers.  Stay seated and relax for 1 minute, then measure your blood pressure again.  How often should I check my blood pressure? -- It depends. Different people need to follow different schedules. Your doctor or nurse will tell you how often to check your blood pressure, and when. Some people need to check their blood pressure twice a day, in the morning and evening.  Your doctor or nurse will probably tell you to keep track of your blood pressure for at least a few days (table 2). Then they will look at the numbers. The reason for this is that it's normal for your blood pressure to change a bit from day to day. For example, the numbers might change depending on whether you recently had caffeine, just exercised, or feel stressed. Checking your blood pressure over several days - or longer - will give your doctor or nurse a better idea of what is average for you.  How should I keep track of my blood pressure? -- Some blood pressure meters will record your numbers for you, or send them to your computer or smartphone. If yours does not do this, you will need to write them down. Your doctor or nurse can help you figure out the best way to keep track of the numbers.  What if my blood pressure is high? -- Your doctor or nurse will tell you what to do if your blood pressure is high when you check it at home. If you  "get a number that is higher than normal, measure it again to see if it is still high. If it is very high (above a certain number, which your doctor or nurse will tell you to watch out for), you should call your doctor right away.  If your blood pressure is only a little high, your doctor or nurse might tell you to keep checking it for a few more days or weeks, and then call if it does not go back down. Then they can help you decide what to do next.  All topics are updated as new evidence becomes available and our peer review process is complete.  This topic retrieved from [employer] on: Sep 21, 2021.  Topic 482449 Version 4.0  Release: 29.4.2 - C29.263  © 2021 UpToDate, Inc. and/or its affiliates. All rights reserved.  table 1: Definition of normal and high blood pressure  Level  Top number  Bottom number    High 130 or above 80 or above   Elevated 120 to 129 79 or below   Normal 119 or below 79 or below   These definitions are from the American College of Cardiology/American Heart Association. Other expert groups might use slightly different definitions.  "Elevated blood pressure" is a term doctor or nurses use as a warning. It means you do not yet have high blood pressure, but your blood pressure is not as low as it should be for good health.  Graphic 69721 Version 6.0  figure 1: Using a home blood pressure meter     This is an example of a person using a home blood pressure meter.  Graphic 931493 Version 1.0    Consumer Information Use and Disclaimer   This information is not specific medical advice and does not replace information you receive from your health care provider. This is only a brief summary of general information. It does NOT include all information about conditions, illnesses, injuries, tests, procedures, treatments, therapies, discharge instructions or life-style choices that may apply to you. You must talk with your health care provider for complete information about your health and treatment " options. This information should not be used to decide whether or not to accept your health care provider's advice, instructions or recommendations. Only your health care provider has the knowledge and training to provide advice that is right for you. The use of this information is governed by the [employer] End User License Agreement, available at https://www.[employer]/en/solutions/[employer]/about/grace.The use of [employer] content is governed by the [employer] Terms of Use. ©2021 UpToDate, Inc. All rights reserved.  Copyright   © 2021 UpToDate, Inc. and/or its affiliates. All rights reserved.      Daily Blood Pressure Log    Please print this form to assist you in keeping track of your blood pressure at home.      Name:  Date of Birth:       Average Blood Pressure:           Date: Time  (a.m.) Blood  Pressure: Pulse  Rate: Time  (p.m.) Blood  Pressure : Pulse  Rate: Comments:   Sample 8:37 127/83 84    Stressful morning                                                                                                                                                                                                     Wishing you good health,     Larry Thompson MD     References:  1-https://www.[employer]/speakers/eva_vertes  2-https://www.[employer].com.au/news/acipj-nmf-72-cancers-that-can-sz-qxgsod-gp-smoking/  3-https://www.cdc.gov/cancer/lung/basic_info/screening.htm  4-https://newsroom.heart.org/news/common-hypertension-medications-may-reduce-colorectal-cancer-risk  5-Goto A, Bill M, Sawada N, et al. High hemoglobin A1c levels within the non-diabetic range are associated with the risk of all cancers. Int J Cancer. 2016;138(7):4965-8298. doi:10.1002/ijc.34282  6-https://www.health.[employer].edu/newsletter_article/the-10-commandments-of-cancer-prevention  7-https://www.health.[employer].edu/diseases-and-conditions/should-i-take-blood-pressure-medications-at-night  8-Melissa CANALES et al 2018 Prevalence of biotin supplement  usage in outpatients and plasma biotin concentrations in patients presenting to the emergency department. Clin Biochem. Epub 2018 Jul 20. PMID: [id number].

## 2023-01-18 ENCOUNTER — LAB VISIT (OUTPATIENT)
Dept: LAB | Facility: HOSPITAL | Age: 73
End: 2023-01-18
Attending: STUDENT IN AN ORGANIZED HEALTH CARE EDUCATION/TRAINING PROGRAM
Payer: MEDICARE

## 2023-01-18 DIAGNOSIS — E27.8 ADRENAL NODULE: ICD-10-CM

## 2023-01-18 LAB — COMPLEXED PSA SERPL-MCNC: 2.6 NG/ML (ref 0–4)

## 2023-01-18 PROCEDURE — 82533 TOTAL CORTISOL: CPT | Performed by: STUDENT IN AN ORGANIZED HEALTH CARE EDUCATION/TRAINING PROGRAM

## 2023-01-19 ENCOUNTER — TELEPHONE (OUTPATIENT)
Dept: VASCULAR SURGERY | Facility: CLINIC | Age: 73
End: 2023-01-19
Payer: MEDICARE

## 2023-01-19 ENCOUNTER — HOSPITAL ENCOUNTER (OUTPATIENT)
Dept: RADIOLOGY | Facility: HOSPITAL | Age: 73
Discharge: HOME | End: 2023-01-19
Attending: STUDENT IN AN ORGANIZED HEALTH CARE EDUCATION/TRAINING PROGRAM
Payer: MEDICARE

## 2023-01-19 DIAGNOSIS — E27.8 ADRENAL NODULE: ICD-10-CM

## 2023-01-19 DIAGNOSIS — I71.9 AORTIC ANEURYSM WITHOUT RUPTURE, UNSPECIFIED PORTION OF AORTA: ICD-10-CM

## 2023-01-19 PROCEDURE — 74176 CT ABD & PELVIS W/O CONTRAST: CPT | Mod: 26,,, | Performed by: RADIOLOGY

## 2023-01-19 PROCEDURE — 74176 CT ABDOMEN PELVIS WITHOUT CONTRAST: ICD-10-PCS | Mod: 26,,, | Performed by: RADIOLOGY

## 2023-01-19 PROCEDURE — 74176 CT ABD & PELVIS W/O CONTRAST: CPT | Mod: TC

## 2023-01-19 NOTE — TELEPHONE ENCOUNTER
----- Message from Radha Gonzalez sent at 1/19/2023 11:20 AM CST -----  Regarding: appt / US needed  Contact: PT @ [phone]  Pt is calling to speak to someone in the office to schedule an appt; pt has a referral in Epic for: I71.9 (ICD-10-CM) - Aortic aneurysm without rupture, unspecified portion of aorta. I offered to schedule pt at [facility] and pt declined. Pt is asking to be scheduled in either [place] or [place]; no available appts in Epic.     Pt is asking for a return call back; he has a spam block on his phone. If pt does not answer, please leave a voice message and he will call back. Thanks.

## 2023-01-20 LAB
CORTIS 12 AM SAL-MCNC: NORMAL NG/ML
CORTIS 8 AM SAL-MCNC: NORMAL NG/ML
CORTIS 8 PM SAL-MCNC: NORMAL NG/ML
CORTIS SAL-MCNC: NORMAL UG/DL

## 2023-01-20 NOTE — TELEPHONE ENCOUNTER
Informed pt Dr. Boyd reviewed his CT abd/pelvis and recommends yearly abdominal ultrasounds since his AAA is only 3.1 cm.

## 2023-01-24 ENCOUNTER — TELEPHONE (OUTPATIENT)
Dept: FAMILY MEDICINE | Facility: CLINIC | Age: 73
End: 2023-01-24
Payer: MEDICARE

## 2023-01-24 DIAGNOSIS — D35.00 ADRENAL ADENOMA, UNSPECIFIED LATERALITY: Primary | ICD-10-CM

## 2023-01-24 RX ORDER — DEXAMETHASONE 1 MG/1
TABLET ORAL
Qty: 1 TABLET | Refills: 0 | Status: SHIPPED | OUTPATIENT
Start: 2023-01-24 | End: 2023-08-02 | Stop reason: SDUPTHER

## 2023-01-24 NOTE — TELEPHONE ENCOUNTER
Informed pt message from provider. Pt verbalized understanding. Pt states he will call back to schedule his lab work after he picks up the medication. Verbalized understanding.

## 2023-01-24 NOTE — TELEPHONE ENCOUNTER
Called and spoke with pt. Informed pt that his cortisol test needs to be repeated. Pt verbalzied understanding and states that someone will need to show him how to do the test when he picks it up. Please place orders.

## 2023-01-24 NOTE — TELEPHONE ENCOUNTER
----- Message from Joy Anderson sent at 1/24/2023 10:18 AM CST -----  There was an issue with the Cortisol test ordered  01/18/2023.  The specimen was quantity not sufficient.  The ordered has been cancelled and will need to be reordered and recollected.  If there are any questions please call the sendout laboratory. Anyone in the sendout lab will be able to assist you.

## 2023-01-31 ENCOUNTER — HOSPITAL ENCOUNTER (OUTPATIENT)
Dept: RADIOLOGY | Facility: HOSPITAL | Age: 73
Discharge: HOME | End: 2023-01-31
Attending: STUDENT IN AN ORGANIZED HEALTH CARE EDUCATION/TRAINING PROGRAM
Payer: MEDICARE

## 2023-01-31 ENCOUNTER — PATIENT MESSAGE (OUTPATIENT)
Dept: FAMILY MEDICINE | Facility: CLINIC | Age: 73
End: 2023-01-31
Payer: MEDICARE

## 2023-01-31 DIAGNOSIS — R91.8 LUNG MASS: ICD-10-CM

## 2023-01-31 DIAGNOSIS — R91.1 LUNG NODULE: Primary | ICD-10-CM

## 2023-01-31 DIAGNOSIS — F17.219 NICOTINE DEPENDENCE, CIGARETTES, WITH UNSPECIFIED NICOTINE-INDUCED DISORDERS: ICD-10-CM

## 2023-01-31 PROCEDURE — 71271 CT THORAX LUNG CANCER SCR C-: CPT | Mod: TC

## 2023-01-31 PROCEDURE — 71271 CT CHEST LUNG SCREENING LOW DOSE: ICD-10-PCS | Mod: 26,,, | Performed by: RADIOLOGY

## 2023-01-31 PROCEDURE — 71271 CT THORAX LUNG CANCER SCR C-: CPT | Mod: 26,,, | Performed by: RADIOLOGY

## 2023-01-31 NOTE — PROGRESS NOTES
Large lung nodule need pet scan to rule out malignancy please schedule. REc pulm ref to discuss as well placed ref

## 2023-02-01 ENCOUNTER — TELEPHONE (OUTPATIENT)
Dept: FAMILY MEDICINE | Facility: CLINIC | Age: 73
End: 2023-02-01
Payer: MEDICARE

## 2023-02-01 NOTE — TELEPHONE ENCOUNTER
----- Message from Larry Thompson MD sent at 1/31/2023  2:42 PM CST -----  Large lung nodule need pet scan to rule out malignancy please schedule. REc pulm ref to discuss as well placed ref

## 2023-02-02 ENCOUNTER — TELEPHONE (OUTPATIENT)
Dept: FAMILY MEDICINE | Facility: CLINIC | Age: 73
End: 2023-02-02
Payer: MEDICARE

## 2023-02-02 ENCOUNTER — TELEPHONE (OUTPATIENT)
Dept: FAMILY MEDICINE | Facility: CLINIC | Age: 73
End: 2023-02-02

## 2023-02-02 NOTE — TELEPHONE ENCOUNTER
----- Message from Joy Gandhi LPN sent at 2/2/2023 12:30 PM CST -----  Regarding: PULMONOLOGY REFERRAL  Patient has questions for PCP about recent CT results. Wants to verify if current was compared to previous films before scheduling referral appointment.   Please contact for concerns.    Thanks,  Diane [facility] LPN

## 2023-02-02 NOTE — TELEPHONE ENCOUNTER
"Pulm referral: spoke with patient. He states waiting to hear back from PCP if Pulm appointment necessary as he has had "this thing for awhile and  checked it.". Did discuss he is established with  and he can review CT scan results for plan of care, but patient wants to hear from PCP to make sure CT compared to previous XR/films. Staff message sent to PCP.  "

## 2023-02-13 ENCOUNTER — OFFICE VISIT (OUTPATIENT)
Dept: PULMONOLOGY | Facility: CLINIC | Age: 73
End: 2023-02-13
Payer: MEDICARE

## 2023-02-13 VITALS
OXYGEN SATURATION: 91 % | WEIGHT: 214.06 LBS | BODY MASS INDEX: 29.97 KG/M2 | HEART RATE: 65 BPM | DIASTOLIC BLOOD PRESSURE: 78 MMHG | SYSTOLIC BLOOD PRESSURE: 160 MMHG | HEIGHT: 71 IN

## 2023-02-13 DIAGNOSIS — R91.1 LUNG NODULE: ICD-10-CM

## 2023-02-13 DIAGNOSIS — Z77.090 ASBESTOS EXPOSURE: ICD-10-CM

## 2023-02-13 DIAGNOSIS — Z72.0 TOBACCO ABUSE: ICD-10-CM

## 2023-02-13 DIAGNOSIS — Z87.891 PERSONAL HISTORY OF NICOTINE DEPENDENCE: ICD-10-CM

## 2023-02-13 DIAGNOSIS — J44.9 CHRONIC OBSTRUCTIVE PULMONARY DISEASE, UNSPECIFIED COPD TYPE: Primary | ICD-10-CM

## 2023-02-13 DIAGNOSIS — R09.89 CHRONIC SINUS COMPLAINTS: ICD-10-CM

## 2023-02-13 DIAGNOSIS — J98.4 APICAL LUNG SCARRING: ICD-10-CM

## 2023-02-13 PROCEDURE — 99999 PR PBB SHADOW E&M-EST. PATIENT-LVL IV: ICD-10-PCS | Mod: PBBFAC,,, | Performed by: INTERNAL MEDICINE

## 2023-02-13 PROCEDURE — 4010F ACE/ARB THERAPY RXD/TAKEN: CPT | Mod: CPTII,S$GLB,, | Performed by: INTERNAL MEDICINE

## 2023-02-13 PROCEDURE — 3077F SYST BP >= 140 MM HG: CPT | Mod: CPTII,S$GLB,, | Performed by: INTERNAL MEDICINE

## 2023-02-13 PROCEDURE — 3288F FALL RISK ASSESSMENT DOCD: CPT | Mod: CPTII,S$GLB,, | Performed by: INTERNAL MEDICINE

## 2023-02-13 PROCEDURE — 1159F MED LIST DOCD IN RCRD: CPT | Mod: CPTII,S$GLB,, | Performed by: INTERNAL MEDICINE

## 2023-02-13 PROCEDURE — 99213 PR OFFICE/OUTPT VISIT, EST, LEVL III, 20-29 MIN: ICD-10-PCS | Mod: S$GLB,,, | Performed by: INTERNAL MEDICINE

## 2023-02-13 PROCEDURE — 1101F PT FALLS ASSESS-DOCD LE1/YR: CPT | Mod: CPTII,S$GLB,, | Performed by: INTERNAL MEDICINE

## 2023-02-13 PROCEDURE — 3044F HG A1C LEVEL LT 7.0%: CPT | Mod: CPTII,S$GLB,, | Performed by: INTERNAL MEDICINE

## 2023-02-13 PROCEDURE — 1125F PR PAIN SEVERITY QUANTIFIED, PAIN PRESENT: ICD-10-PCS | Mod: CPTII,S$GLB,, | Performed by: INTERNAL MEDICINE

## 2023-02-13 PROCEDURE — 3078F PR MOST RECENT DIASTOLIC BLOOD PRESSURE < 80 MM HG: ICD-10-PCS | Mod: CPTII,S$GLB,, | Performed by: INTERNAL MEDICINE

## 2023-02-13 PROCEDURE — 3078F DIAST BP <80 MM HG: CPT | Mod: CPTII,S$GLB,, | Performed by: INTERNAL MEDICINE

## 2023-02-13 PROCEDURE — 3008F PR BODY MASS INDEX (BMI) DOCUMENTED: ICD-10-PCS | Mod: CPTII,S$GLB,, | Performed by: INTERNAL MEDICINE

## 2023-02-13 PROCEDURE — 1159F PR MEDICATION LIST DOCUMENTED IN MEDICAL RECORD: ICD-10-PCS | Mod: CPTII,S$GLB,, | Performed by: INTERNAL MEDICINE

## 2023-02-13 PROCEDURE — 3288F PR FALLS RISK ASSESSMENT DOCUMENTED: ICD-10-PCS | Mod: CPTII,S$GLB,, | Performed by: INTERNAL MEDICINE

## 2023-02-13 PROCEDURE — 3008F BODY MASS INDEX DOCD: CPT | Mod: CPTII,S$GLB,, | Performed by: INTERNAL MEDICINE

## 2023-02-13 PROCEDURE — 99999 PR PBB SHADOW E&M-EST. PATIENT-LVL IV: CPT | Mod: PBBFAC,,, | Performed by: INTERNAL MEDICINE

## 2023-02-13 PROCEDURE — 1125F AMNT PAIN NOTED PAIN PRSNT: CPT | Mod: CPTII,S$GLB,, | Performed by: INTERNAL MEDICINE

## 2023-02-13 PROCEDURE — 1101F PR PT FALLS ASSESS DOC 0-1 FALLS W/OUT INJ PAST YR: ICD-10-PCS | Mod: CPTII,S$GLB,, | Performed by: INTERNAL MEDICINE

## 2023-02-13 PROCEDURE — 3077F PR MOST RECENT SYSTOLIC BLOOD PRESSURE >= 140 MM HG: ICD-10-PCS | Mod: CPTII,S$GLB,, | Performed by: INTERNAL MEDICINE

## 2023-02-13 PROCEDURE — 3044F PR MOST RECENT HEMOGLOBIN A1C LEVEL <7.0%: ICD-10-PCS | Mod: CPTII,S$GLB,, | Performed by: INTERNAL MEDICINE

## 2023-02-13 PROCEDURE — 4010F PR ACE/ARB THEARPY RXD/TAKEN: ICD-10-PCS | Mod: CPTII,S$GLB,, | Performed by: INTERNAL MEDICINE

## 2023-02-13 PROCEDURE — 99213 OFFICE O/P EST LOW 20 MIN: CPT | Mod: S$GLB,,, | Performed by: INTERNAL MEDICINE

## 2023-02-13 RX ORDER — ALBUTEROL SULFATE 90 UG/1
2 AEROSOL, METERED RESPIRATORY (INHALATION) EVERY 6 HOURS PRN
Qty: 18 G | Refills: 11 | Status: SHIPPED | OUTPATIENT
Start: 2023-02-13 | End: 2024-03-04 | Stop reason: SDUPTHER

## 2023-02-13 NOTE — PROGRESS NOTES
"2/13/2023    Umang Goldberg  New Patient Consult    No chief complaint on file.      HPI:  2/13/2023 ct chest 1/31/2023 c/w cxr 10/28/2022 -- apical nodules seen bilat, coroanary calcifications and copd seen.       5/27/2019   , worked [employer], asbestos exposure, smoker trying to quit, wife notes snoring.  epworth 4. Wife says snoring worse after occasional drinks.     Had chr nasal stuffiness - uses saline prn.      Served in [employer].      Uses cane to ambulate - had chr back and right leg problems.  Gets injections occ.  \    No lung c/o  Patient Instructions   Wheezes heard on exam- copd  Present- likley not bothering due to back limits.      Snoring noted - any stopping breathing?    epworth score not bad - would not recommend pursuing sleep test unless apnea noted by wife.  Would check 02 level sleeping.    Chronic sinus problems-Use afrin (over the counter) at bedtime if stuffy followed in 10 minutes by Flonase should make breathing better and may decrease snoring.      Asbestos exposure- check chest xray- could do ct chest if you wish to screen for lung cancer-- best to stop smokes as you are trying..    Try Symbicort consider more breathing medications if helps dramatic.    Breathing test may help as some wheezes are heard on exam.    cxr in 2017 shows copd- would repeat.      Will get back over phone,  Could get sleep test if reports of stopped breathing- call is so.  Call if wish for nebulizer/breathing treatments.  The chief compliant  problem is new to me",   PFSH:  Past Medical History:   Diagnosis Date    A-fib 1 year    Diabetes mellitus     Hypertension          Past Surgical History:   Procedure Laterality Date    BACK SURGERY      EYE SURGERY      HERNIA REPAIR       Social History     Tobacco Use    Smoking status: Every Day     Packs/day: 0.50     Types: Vaping with nicotine, Cigarettes     Passive exposure: Current    Smokeless tobacco: Never   Substance Use Topics    Alcohol use: Yes     " Alcohol/week: 4.0 - 6.0 standard drinks     Types: 4 - 6 Cans of beer per week    Drug use: Yes     Types: Hydrocodone     Family History   Problem Relation Age of Onset    Skin cancer Father     Psoriasis Daughter     Glaucoma Neg Hx     Macular degeneration Neg Hx     Retinal detachment Neg Hx     Melanoma Neg Hx     Lupus Neg Hx     Eczema Neg Hx      Review of patient's allergies indicates:   Allergen Reactions    Contrast media Hives and Itching     Pt can have IV contrast with premedication       Performance Status:The patient's activity level is mobility with asit devices.      Review of Systems:  a review of eleven systems covering constitutional, Eye, HEENT, Psych, Respiratory, Cardiac, GI, , Musculoskeletal, Endocrine, Dermatologic was negative except for pertinent findings as listed ABOVE and below:  pertinent positive as above, rest is good       Exam:Comprehensive exam done. There were no vitals taken for this visit.  Exam included Vitals as listed, and patient's appearance and affect and alertness and mood, oral exam for yeast and hygiene and pharynx lesions and Mallapatti (M) score, neck with inspection for jvd and masses and thyroid abnormalities and lymph nodes (supraclavicular and infraclavicular nodes and axillary also examined and noted if abn), chest exam included symmetry and effort and fremitus and percussion and auscultation, cardiac exam included rhythm and gallops and murmur and rubs and jvd and edema, abdominal exam for mass and hepatosplenomegaly and tenderness and hernias and bowel sounds, Musculoskeletal exam with muscle tone and posture and mobility/gait and  strength, and skin for rashes and cyanosis and pallor and turgor, extremity for clubbing.  Findings were normal except for pertinent findings listed below:   chest is symmetric, no distress, normal percussion, normal fremitus and sl rhonchi, M3, no edema    Radiographs (ct chest and cxr) reviewed: view by direct vision   11/14/17 cxr  Some copd.    Labs reviewed           PFT was not done       Plan:  Clinical impression is apparently straight forward and impression with management as below.    There are no diagnoses linked to this encounter.      No follow-ups on file.    Discussed with patient above for education the following:      There are no Patient Instructions on file for this visit.

## 2023-02-13 NOTE — PATIENT INSTRUCTIONS
Stop smokes    Check ct in a yr.    Pattern not too bad looking -- likely not cancer but cancer risk between 1-7% depending on model.        Use albuterol as needed-- wheeze noted today    Back pain still limits--

## 2023-02-13 NOTE — PROGRESS NOTES
"2/12/2024    Umang PAULINE Ashlyn  New Patient Consult    Chief Complaint   Patient presents with    Abnormal Ct Scan     1/31/2023       HPI:  2/13/2023 ct chest 1/31/2023 c/w cxr 10/28/2022 -- apical nodules seen bilat, coroanary calcifications and copd seen.     Pt was doing ok til back fracture last yr-- picked up stuck  with l2 fx.      Ct chest viewed--- apical scarring seen on prior cxr.  Chance cancer 1-7% depending on model.  Pattern not overly impressive but should have follow up .      We discuss -- will f/u ct in a yr. Pet not needed.       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  epworth 4. Wife says snoring worse after occasional drinks.     Had chr nasal stuffiness - uses saline prn.      Served in [employer].      Uses cane to ambulate - had chr back and right leg problems.  Gets injections occ.  \    No lung c/o  Patient Instructions   Wheezes heard on exam- copd  Present- likley not bothering due to back limits.      Snoring noted - any stopping breathing?    epworth score not bad - would not recommend pursuing sleep test unless apnea noted by wife.  Would check 02 level sleeping.    Chronic sinus problems-Use afrin (over the counter) at bedtime if stuffy followed in 10 minutes by Flonase should make breathing better and may decrease snoring.      Asbestos exposure- check chest xray- could do ct chest if you wish to screen for lung cancer-- best to stop smokes as you are trying..    Try Symbicort consider more breathing medications if helps dramatic.    Breathing test may help as some wheezes are heard on exam.    cxr in 2017 shows copd- would repeat.      Will get back over phone,  Could get sleep test if reports of stopped breathing- call is so.  Call if wish for nebulizer/breathing treatments.  The chief compliant  problem is new to me",   [facility]:  Past Medical History:   Diagnosis Date    A-fib 1 year    Diabetes mellitus     Hypertension  " "        Past Surgical History:   Procedure Laterality Date    BACK SURGERY      EYE SURGERY      HERNIA REPAIR       Social History     Tobacco Use    Smoking status: Every Day     Current packs/day: 0.50     Types: Vaping with nicotine, Cigarettes     Passive exposure: Current    Smokeless tobacco: Never   Substance Use Topics    Alcohol use: Yes     Alcohol/week: 4.0 - 6.0 standard drinks of alcohol     Types: 4 - 6 Cans of beer per week    Drug use: Yes     Types: Hydrocodone     Family History   Problem Relation Age of Onset    Skin cancer Father     Psoriasis Daughter     Glaucoma Neg Hx     Macular degeneration Neg Hx     Retinal detachment Neg Hx     Melanoma Neg Hx     Lupus Neg Hx     Eczema Neg Hx      Review of patient's allergies indicates:   Allergen Reactions    Contrast media Hives and Itching     Pt can have IV contrast with premedication       Performance Status:The patient's activity level is mobility with asit devices.      Review of Systems:  a review of eleven systems covering constitutional, Eye, HEENT, Psych, Respiratory, Cardiac, GI, , Musculoskeletal, Endocrine, Dermatologic was negative except for pertinent findings as listed ABOVE and below:  pertinent positive as above, rest is good       Exam:Comprehensive exam done. BP (!) 160/78 (BP Location: Right arm, Patient Position: Sitting, BP Method: Medium (Automatic))   Pulse 65   Ht 5' 11" (1.803 m)   Wt 97.1 kg (214 lb 1.1 oz)   SpO2 (!) 91% Comment: on room air at rest  BMI 29.86 kg/m²   Exam included Vitals as listed, and patient's appearance and affect and alertness and mood, oral exam for yeast and hygiene and pharynx lesions and Mallapatti (M) score, neck with inspection for jvd and masses and thyroid abnormalities and lymph nodes (supraclavicular and infraclavicular nodes and axillary also examined and noted if abn), chest exam included symmetry and effort and fremitus and percussion and auscultation, cardiac exam included " rhythm and gallops and murmur and rubs and jvd and edema, abdominal exam for mass and hepatosplenomegaly and tenderness and hernias and bowel sounds, Musculoskeletal exam with muscle tone and posture and mobility/gait and  strength, and skin for rashes and cyanosis and pallor and turgor, extremity for clubbing.  Findings were normal except for pertinent findings listed below:   chest is symmetric, no distress, normal percussion, normal fremitus and sl rhonchi, M3, no edema    Radiographs (ct chest and cxr) reviewed: view by direct vision  11/14/17 cxr  Some copd.    Labs reviewed           PFT was not done       Plan:  Clinical impression is apparently straight forward and impression with management as below.    Umang was seen today for abnormal ct scan.    Diagnoses and all orders for this visit:    Chronic obstructive pulmonary disease, unspecified COPD type  -     albuterol (VENTOLIN HFA) 90 mcg/actuation inhaler; Inhale 2 puffs into the lungs every 6 (six) hours as needed for Wheezing. Rescue    Lung nodule  -     Ambulatory referral/consult to Pulmonology    Apical lung scarring    Tobacco abuse, half ppd, started age 18, 50+ py  -     CT Chest Lung Screening Low Dose; Future    Asbestos exposure    Chronic sinus complaints    Personal history of nicotine dependence  -     CT Chest Lung Screening Low Dose; Future          Follow up in about 1 year (around 2/13/2024).    Discussed with patient above for education the following:      Patient Instructions   Stop smokes    Check ct in a yr.    Pattern not too bad looking -- likely not cancer but cancer risk between 1-7% depending on model.        Use albuterol as needed-- wheeze noted today    Back pain still limits--

## 2023-04-20 DIAGNOSIS — R60.0 BILATERAL LEG EDEMA: ICD-10-CM

## 2023-04-20 DIAGNOSIS — E78.5 HYPERLIPIDEMIA ASSOCIATED WITH TYPE 2 DIABETES MELLITUS: ICD-10-CM

## 2023-04-20 DIAGNOSIS — E11.69 HYPERLIPIDEMIA ASSOCIATED WITH TYPE 2 DIABETES MELLITUS: ICD-10-CM

## 2023-04-20 DIAGNOSIS — N52.9 ERECTILE DYSFUNCTION, UNSPECIFIED ERECTILE DYSFUNCTION TYPE: ICD-10-CM

## 2023-04-20 RX ORDER — SILDENAFIL CITRATE 20 MG/1
TABLET ORAL
Qty: 30 TABLET | Refills: 2 | Status: SHIPPED | OUTPATIENT
Start: 2023-04-20 | End: 2023-08-02 | Stop reason: SDUPTHER

## 2023-04-20 RX ORDER — FUROSEMIDE 20 MG/1
40 TABLET ORAL DAILY PRN
Qty: 180 TABLET | Refills: 3 | Status: SHIPPED | OUTPATIENT
Start: 2023-04-20

## 2023-04-20 RX ORDER — ATORVASTATIN CALCIUM 20 MG/1
20 TABLET, FILM COATED ORAL DAILY
Qty: 90 TABLET | Refills: 3 | Status: SHIPPED | OUTPATIENT
Start: 2023-04-20

## 2023-04-20 NOTE — TELEPHONE ENCOUNTER
No new care gaps identified.  [facility] Embedded Care Gaps. Reference number: [id number]. 4/20/2023   2:35:57 PM CDT

## 2023-04-20 NOTE — TELEPHONE ENCOUNTER
----- Message from Rasheed Paz sent at 4/20/2023  2:28 PM CDT -----  Contact: Pt  Type: RX Refill Request  Who Called:Pt  Refill or New RX:Refill  RX Name and Strength:sildenafil (REVATIO) 20 mg Tab  atorvastatin (LIPITOR) 20 MG tablet  furosemide (LASIX) 20 MG tablet  How is the patient currently taking it: As Directed  Is this a 30 or 90 day : as Directed  Preferred Pharmacy/Phone Number:  [id number] - [place] HERNANDEZ - [address] [address]  Phone: [phone] Fax: [phone]      Best Call Back Number:[phone]    Additional Information :N/A

## 2023-06-19 ENCOUNTER — PES CALL (OUTPATIENT)
Dept: ADMINISTRATIVE | Facility: CLINIC | Age: 73
End: 2023-06-19
Payer: MEDICARE

## 2023-06-21 DIAGNOSIS — E11.9 TYPE 2 DIABETES MELLITUS WITHOUT COMPLICATION: ICD-10-CM

## 2023-06-26 ENCOUNTER — PATIENT MESSAGE (OUTPATIENT)
Dept: ADMINISTRATIVE | Facility: HOSPITAL | Age: 73
End: 2023-06-26
Payer: MEDICARE

## 2023-06-27 ENCOUNTER — PES CALL (OUTPATIENT)
Dept: ADMINISTRATIVE | Facility: CLINIC | Age: 73
End: 2023-06-27
Payer: MEDICARE

## 2023-07-17 ENCOUNTER — TELEPHONE (OUTPATIENT)
Dept: FAMILY MEDICINE | Facility: CLINIC | Age: 73
End: 2023-07-17
Payer: MEDICARE

## 2023-07-17 DIAGNOSIS — R79.89 ELEVATED PLASMA METANEPHRINES: ICD-10-CM

## 2023-07-17 DIAGNOSIS — R79.9 ABNORMAL FINDING OF BLOOD CHEMISTRY, UNSPECIFIED: ICD-10-CM

## 2023-07-17 DIAGNOSIS — E11.69 HYPERLIPIDEMIA ASSOCIATED WITH TYPE 2 DIABETES MELLITUS: ICD-10-CM

## 2023-07-17 DIAGNOSIS — E78.5 HYPERLIPIDEMIA ASSOCIATED WITH TYPE 2 DIABETES MELLITUS: ICD-10-CM

## 2023-07-17 DIAGNOSIS — E11.65 TYPE 2 DIABETES MELLITUS WITH HYPERGLYCEMIA, WITHOUT LONG-TERM CURRENT USE OF INSULIN: ICD-10-CM

## 2023-07-17 DIAGNOSIS — Z00.00 ENCOUNTER FOR PREVENTIVE HEALTH EXAMINATION: Primary | ICD-10-CM

## 2023-07-17 DIAGNOSIS — I10 ESSENTIAL HYPERTENSION: ICD-10-CM

## 2023-07-17 PROCEDURE — 99452 E-CONSULT TO ENDOCRINOLOGY: ICD-10-PCS | Mod: S$GLB,,, | Performed by: STUDENT IN AN ORGANIZED HEALTH CARE EDUCATION/TRAINING PROGRAM

## 2023-07-17 PROCEDURE — 99452 NTRPROF PH1/NTRNET/EHR RFRL: CPT | Mod: S$GLB,,, | Performed by: STUDENT IN AN ORGANIZED HEALTH CARE EDUCATION/TRAINING PROGRAM

## 2023-07-17 NOTE — TELEPHONE ENCOUNTER
----- Message from Maura Hayes sent at 7/17/2023  8:12 AM CDT -----  Contact: pt  Type:  Needs Medical Advice    Who Called: pt  Would the patient rather a call back or a response via [facility]? Call back  Best Call Back Number: [phone]  Additional Information: Needs orders for labs put in the system so he can have them done before his 8/2 appt.   Please advise   thanks

## 2023-07-17 NOTE — TELEPHONE ENCOUNTER
Called and spoke with pt. Informed pt of message from provider. Pt states that Dr. Fuentes's office the one that canceled pts appt. Pt agrees to you doing a econsult with endocrinology.

## 2023-07-18 ENCOUNTER — E-CONSULT (OUTPATIENT)
Dept: ENDOCRINOLOGY | Facility: CLINIC | Age: 73
End: 2023-07-18
Payer: MEDICARE

## 2023-07-18 ENCOUNTER — TELEPHONE (OUTPATIENT)
Dept: ENDOCRINOLOGY | Facility: CLINIC | Age: 73
End: 2023-07-18

## 2023-07-18 DIAGNOSIS — M81.0 OSTEOPOROSIS, UNSPECIFIED OSTEOPOROSIS TYPE, UNSPECIFIED PATHOLOGICAL FRACTURE PRESENCE: ICD-10-CM

## 2023-07-18 DIAGNOSIS — E27.8 ADRENAL INCIDENTALOMA: ICD-10-CM

## 2023-07-18 DIAGNOSIS — R79.9 ABNORMAL FINDING OF BLOOD CHEMISTRY, UNSPECIFIED: Primary | ICD-10-CM

## 2023-07-18 DIAGNOSIS — E27.8 ADRENAL MASS: ICD-10-CM

## 2023-07-18 DIAGNOSIS — S32.020K CLOSED COMPRESSION FRACTURE OF L2 LUMBAR VERTEBRA WITH NONUNION, SUBSEQUENT ENCOUNTER: ICD-10-CM

## 2023-07-18 DIAGNOSIS — M81.6 LOCALIZED OSTEOPOROSIS (LEQUESNE): ICD-10-CM

## 2023-07-18 PROCEDURE — 99451 PR INTERPROF, PHONE/INTERNET/EHR, CONSULT, >= 5MINS: ICD-10-PCS | Mod: S$GLB,,, | Performed by: INTERNAL MEDICINE

## 2023-07-18 PROCEDURE — 99451 NTRPROF PH1/NTRNET/EHR 5/>: CPT | Mod: S$GLB,,, | Performed by: INTERNAL MEDICINE

## 2023-07-18 NOTE — CONSULTS
[facility]  Response for E-Consult     Patient Name: Umang Goldberg  MRN: [id number]  Primary Care Provider: Larry Thompson MD   Requesting Provider: Larry Thompson MD  E-Consult to Endocrinology  Consult performed by: Yanci Akins MD  Consult ordered by: Larry Thompson MD        Question:  73-year-old with bilateral adrenal incidentalomas 1st noted on imaging in 2021.      2.2 cm right adrenal nodule Hounsfield unit 57 and 2 cm left adrenal nodule Hounsfield unit 60.      Plasma metanephrines were mildly elevated....  Less than 2 times upper limits normal.  Normal renin Nicanor.  I do not see a dexamethasone suppression test.       Recommend  Would need baseline 8:00 a.m. ACTH, DHEA-S and cortisol levels.    1 mg dexamethasone suppression test to evaluate for autonomous cortisol secretion.      Repeat dedicated adrenal imaging to assess baseline Hounsfield units and washout.  24 hour urine collection for metanephrines, cortisol and creatinine (to further evaluate the                          abnormal  plasma metanephrines)    Note a low a DHEA-S, an abnormal dexamethasone suppression tests (8 am cortisol is greater than 1.8 mcg/dL) or abnormal urine metanephrines should be referred to endocrinology.    If imaging is benign and hormonal evaluation is negative would repeat the DHEAS level and dexamethasone suppression tests annually for 4 years.  Would also repeat imaging at 12 months.  Can either do this evaluation on your own a refer to Endocrine.      In addition, there was a vertebral compression fracture noted on imaging 2021, would get dedicated bone density.  Would treat it as osteoporosis                   Total time of Consultation: 15 minute    I did not speak to the requesting provider verbally about this.     *This eConsult is based on the clinical data available to me and is furnished without benefit of a physical examination. The eConsult will need to be  interpreted in light of any clinical issues or changes in patient status not available to me at the time of filing this eConsults. Significant changes in patient condition or level of acuity should result in immediate formal consultation and reevaluation. Please alert me if you have further questions.    Thank you for this eConsult referral.     MD Andrei Clifton - [facility]

## 2023-07-18 NOTE — TELEPHONE ENCOUNTER
Called and spoke with pt. Informed pt of message from provider. Pt scheduled for BP check on 7/19.

## 2023-07-19 ENCOUNTER — CLINICAL SUPPORT (OUTPATIENT)
Dept: FAMILY MEDICINE | Facility: CLINIC | Age: 73
End: 2023-07-19
Payer: MEDICARE

## 2023-07-19 VITALS — SYSTOLIC BLOOD PRESSURE: 136 MMHG | HEART RATE: 68 BPM | DIASTOLIC BLOOD PRESSURE: 68 MMHG

## 2023-07-19 DIAGNOSIS — I10 HYPERTENSION, UNSPECIFIED TYPE: Primary | ICD-10-CM

## 2023-07-19 PROCEDURE — 99999 PR PBB SHADOW E&M-EST. PATIENT-LVL II: CPT | Mod: PBBFAC,,,

## 2023-07-19 PROCEDURE — 99999 PR PBB SHADOW E&M-EST. PATIENT-LVL II: ICD-10-PCS | Mod: PBBFAC,,,

## 2023-07-19 NOTE — PROGRESS NOTES
Patient came in today for a BP check due to having a high reading at his last visit.   BP this morning was 136/68.  A message has been sent to DR Thompson to advise of today's findings.

## 2023-07-20 ENCOUNTER — PATIENT OUTREACH (OUTPATIENT)
Dept: ADMINISTRATIVE | Facility: HOSPITAL | Age: 73
End: 2023-07-20
Payer: MEDICARE

## 2023-07-20 NOTE — PROGRESS NOTES

## 2023-07-24 ENCOUNTER — TELEPHONE (OUTPATIENT)
Dept: FAMILY MEDICINE | Facility: CLINIC | Age: 73
End: 2023-07-24
Payer: MEDICARE

## 2023-07-24 NOTE — TELEPHONE ENCOUNTER
----- Message from Thierno Quick sent at 7/24/2023 11:59 AM CDT -----  Contact: self  Type: Sooner Appointment Request        Caller is requesting a sooner appointment. Caller declined first available appointment listed below. Caller will not accept being placed on the waitlist and is requesting a message be sent to doctor.        Name of Caller: Patient   Best Call Back Number: [phone]  Additional Information: Pt states he wants to know why he needs another CT test done, because he had one done in January. Pt states he had this same exact order done on 1/19/2023.  Thanks

## 2023-07-25 ENCOUNTER — TELEPHONE (OUTPATIENT)
Dept: FAMILY MEDICINE | Facility: CLINIC | Age: 73
End: 2023-07-25
Payer: MEDICARE

## 2023-07-25 RX ORDER — DIPHENHYDRAMINE HCL 50 MG
50 CAPSULE ORAL
Qty: 1 CAPSULE | Refills: 0 | Status: SHIPPED | OUTPATIENT
Start: 2023-07-25 | End: 2024-03-07

## 2023-07-25 RX ORDER — PREDNISONE 50 MG/1
TABLET ORAL
Qty: 3 TABLET | Refills: 0 | Status: SHIPPED | OUTPATIENT
Start: 2023-07-25 | End: 2024-03-07

## 2023-07-25 NOTE — TELEPHONE ENCOUNTER
Patient came on 7/19/23 for a BP check due to having a high reading at his last visit.  His reading this morning was 136/68.  Please advise if patient requires further instruction.

## 2023-07-25 NOTE — TELEPHONE ENCOUNTER
Larry Thompson MD  You 2 hours ago (1:49 PM)       Blood pressure looks good. Will continue current medications. Thanks for update!

## 2023-07-26 ENCOUNTER — TELEPHONE (OUTPATIENT)
Dept: FAMILY MEDICINE | Facility: CLINIC | Age: 73
End: 2023-07-26
Payer: MEDICARE

## 2023-07-26 NOTE — TELEPHONE ENCOUNTER
Letter sent to pt home address to notify pt of recommendations & to contact us for an appointment.

## 2023-07-26 NOTE — TELEPHONE ENCOUNTER
"----- Message from Larry Thompson MD sent at 7/18/2023  2:30 PM CDT -----  Regarding: endo recs for adrenal mass  Please let the patient know that endocrinology looked at his adrenal mass and recommended to get another CT with contrast to get a better picture of it.  The doctor also recommended that he get his cortisol levels checked at 8:00 a.m. along with additional blood work after he takes a dexamethasone 1 mg tablet at 11:00 p.m. the night prior to the blood work.  She also recommended that he do a 24 hour urinary metanephrine test to check and make sure he does not have elevated metanephrine levels that can create episodes of high blood pressure.  She also said "If imaging is benign and hormonal evaluation is negative would repeat the DHEAS level and dexamethasone suppression tests annually for 4 years.  Would also repeat imaging at 12 months." He also needs a DEXA bone scan due to his past compression fracture seen on imaging.     I recommend he establish with a endocrinologist and they can look over the blood work that I ordered. Please set up appt with me to go over all of this as well within next month thanks.     "

## 2023-08-02 ENCOUNTER — OFFICE VISIT (OUTPATIENT)
Dept: FAMILY MEDICINE | Facility: CLINIC | Age: 73
End: 2023-08-02
Payer: MEDICARE

## 2023-08-02 VITALS
WEIGHT: 205 LBS | BODY MASS INDEX: 28.7 KG/M2 | RESPIRATION RATE: 18 BRPM | SYSTOLIC BLOOD PRESSURE: 110 MMHG | OXYGEN SATURATION: 94 % | HEART RATE: 57 BPM | DIASTOLIC BLOOD PRESSURE: 58 MMHG | HEIGHT: 71 IN | TEMPERATURE: 98 F

## 2023-08-02 DIAGNOSIS — E11.59 HYPERTENSION ASSOCIATED WITH DIABETES: ICD-10-CM

## 2023-08-02 DIAGNOSIS — G89.29 CHRONIC MIDLINE LOW BACK PAIN, UNSPECIFIED WHETHER SCIATICA PRESENT: Primary | ICD-10-CM

## 2023-08-02 DIAGNOSIS — E11.59 TYPE 2 DIABETES MELLITUS WITH OTHER CIRCULATORY COMPLICATIONS: ICD-10-CM

## 2023-08-02 DIAGNOSIS — M54.50 CHRONIC MIDLINE LOW BACK PAIN, UNSPECIFIED WHETHER SCIATICA PRESENT: Primary | ICD-10-CM

## 2023-08-02 DIAGNOSIS — E78.5 HYPERLIPIDEMIA ASSOCIATED WITH TYPE 2 DIABETES MELLITUS: ICD-10-CM

## 2023-08-02 DIAGNOSIS — E27.8 ADRENAL NODULE: ICD-10-CM

## 2023-08-02 DIAGNOSIS — E11.69 HYPERLIPIDEMIA ASSOCIATED WITH TYPE 2 DIABETES MELLITUS: ICD-10-CM

## 2023-08-02 DIAGNOSIS — Z12.11 COLON CANCER SCREENING: ICD-10-CM

## 2023-08-02 DIAGNOSIS — D35.00 ADRENAL ADENOMA, UNSPECIFIED LATERALITY: ICD-10-CM

## 2023-08-02 DIAGNOSIS — N52.9 ERECTILE DYSFUNCTION, UNSPECIFIED ERECTILE DYSFUNCTION TYPE: ICD-10-CM

## 2023-08-02 DIAGNOSIS — F17.219 NICOTINE DEPENDENCE, CIGARETTES, WITH UNSPECIFIED NICOTINE-INDUCED DISORDERS: ICD-10-CM

## 2023-08-02 DIAGNOSIS — I15.2 HYPERTENSION ASSOCIATED WITH DIABETES: ICD-10-CM

## 2023-08-02 PROCEDURE — 1126F PR PAIN SEVERITY QUANTIFIED, NO PAIN PRESENT: ICD-10-PCS | Mod: CPTII,S$GLB,, | Performed by: STUDENT IN AN ORGANIZED HEALTH CARE EDUCATION/TRAINING PROGRAM

## 2023-08-02 PROCEDURE — 3060F POS MICROALBUMINURIA REV: CPT | Mod: CPTII,S$GLB,, | Performed by: STUDENT IN AN ORGANIZED HEALTH CARE EDUCATION/TRAINING PROGRAM

## 2023-08-02 PROCEDURE — 1101F PR PT FALLS ASSESS DOC 0-1 FALLS W/OUT INJ PAST YR: ICD-10-PCS | Mod: CPTII,S$GLB,, | Performed by: STUDENT IN AN ORGANIZED HEALTH CARE EDUCATION/TRAINING PROGRAM

## 2023-08-02 PROCEDURE — 3008F BODY MASS INDEX DOCD: CPT | Mod: CPTII,S$GLB,, | Performed by: STUDENT IN AN ORGANIZED HEALTH CARE EDUCATION/TRAINING PROGRAM

## 2023-08-02 PROCEDURE — 4010F PR ACE/ARB THEARPY RXD/TAKEN: ICD-10-PCS | Mod: CPTII,S$GLB,, | Performed by: STUDENT IN AN ORGANIZED HEALTH CARE EDUCATION/TRAINING PROGRAM

## 2023-08-02 PROCEDURE — 99214 OFFICE O/P EST MOD 30 MIN: CPT | Mod: S$GLB,,, | Performed by: STUDENT IN AN ORGANIZED HEALTH CARE EDUCATION/TRAINING PROGRAM

## 2023-08-02 PROCEDURE — 3078F PR MOST RECENT DIASTOLIC BLOOD PRESSURE < 80 MM HG: ICD-10-PCS | Mod: CPTII,S$GLB,, | Performed by: STUDENT IN AN ORGANIZED HEALTH CARE EDUCATION/TRAINING PROGRAM

## 2023-08-02 PROCEDURE — 3008F PR BODY MASS INDEX (BMI) DOCUMENTED: ICD-10-PCS | Mod: CPTII,S$GLB,, | Performed by: STUDENT IN AN ORGANIZED HEALTH CARE EDUCATION/TRAINING PROGRAM

## 2023-08-02 PROCEDURE — 99214 PR OFFICE/OUTPT VISIT, EST, LEVL IV, 30-39 MIN: ICD-10-PCS | Mod: S$GLB,,, | Performed by: STUDENT IN AN ORGANIZED HEALTH CARE EDUCATION/TRAINING PROGRAM

## 2023-08-02 PROCEDURE — 3044F HG A1C LEVEL LT 7.0%: CPT | Mod: CPTII,S$GLB,, | Performed by: STUDENT IN AN ORGANIZED HEALTH CARE EDUCATION/TRAINING PROGRAM

## 2023-08-02 PROCEDURE — 3066F NEPHROPATHY DOC TX: CPT | Mod: CPTII,S$GLB,, | Performed by: STUDENT IN AN ORGANIZED HEALTH CARE EDUCATION/TRAINING PROGRAM

## 2023-08-02 PROCEDURE — 1159F PR MEDICATION LIST DOCUMENTED IN MEDICAL RECORD: ICD-10-PCS | Mod: CPTII,S$GLB,, | Performed by: STUDENT IN AN ORGANIZED HEALTH CARE EDUCATION/TRAINING PROGRAM

## 2023-08-02 PROCEDURE — 1101F PT FALLS ASSESS-DOCD LE1/YR: CPT | Mod: CPTII,S$GLB,, | Performed by: STUDENT IN AN ORGANIZED HEALTH CARE EDUCATION/TRAINING PROGRAM

## 2023-08-02 PROCEDURE — 3074F PR MOST RECENT SYSTOLIC BLOOD PRESSURE < 130 MM HG: ICD-10-PCS | Mod: CPTII,S$GLB,, | Performed by: STUDENT IN AN ORGANIZED HEALTH CARE EDUCATION/TRAINING PROGRAM

## 2023-08-02 PROCEDURE — 1126F AMNT PAIN NOTED NONE PRSNT: CPT | Mod: CPTII,S$GLB,, | Performed by: STUDENT IN AN ORGANIZED HEALTH CARE EDUCATION/TRAINING PROGRAM

## 2023-08-02 PROCEDURE — 3288F FALL RISK ASSESSMENT DOCD: CPT | Mod: CPTII,S$GLB,, | Performed by: STUDENT IN AN ORGANIZED HEALTH CARE EDUCATION/TRAINING PROGRAM

## 2023-08-02 PROCEDURE — 1159F MED LIST DOCD IN RCRD: CPT | Mod: CPTII,S$GLB,, | Performed by: STUDENT IN AN ORGANIZED HEALTH CARE EDUCATION/TRAINING PROGRAM

## 2023-08-02 PROCEDURE — 3066F PR DOCUMENTATION OF TREATMENT FOR NEPHROPATHY: ICD-10-PCS | Mod: CPTII,S$GLB,, | Performed by: STUDENT IN AN ORGANIZED HEALTH CARE EDUCATION/TRAINING PROGRAM

## 2023-08-02 PROCEDURE — 4010F ACE/ARB THERAPY RXD/TAKEN: CPT | Mod: CPTII,S$GLB,, | Performed by: STUDENT IN AN ORGANIZED HEALTH CARE EDUCATION/TRAINING PROGRAM

## 2023-08-02 PROCEDURE — 99999 PR PBB SHADOW E&M-EST. PATIENT-LVL IV: CPT | Mod: PBBFAC,,, | Performed by: STUDENT IN AN ORGANIZED HEALTH CARE EDUCATION/TRAINING PROGRAM

## 2023-08-02 PROCEDURE — 3044F PR MOST RECENT HEMOGLOBIN A1C LEVEL <7.0%: ICD-10-PCS | Mod: CPTII,S$GLB,, | Performed by: STUDENT IN AN ORGANIZED HEALTH CARE EDUCATION/TRAINING PROGRAM

## 2023-08-02 PROCEDURE — 3078F DIAST BP <80 MM HG: CPT | Mod: CPTII,S$GLB,, | Performed by: STUDENT IN AN ORGANIZED HEALTH CARE EDUCATION/TRAINING PROGRAM

## 2023-08-02 PROCEDURE — 3060F PR POS MICROALBUMINURIA RESULT DOCUMENTED/REVIEW: ICD-10-PCS | Mod: CPTII,S$GLB,, | Performed by: STUDENT IN AN ORGANIZED HEALTH CARE EDUCATION/TRAINING PROGRAM

## 2023-08-02 PROCEDURE — 3074F SYST BP LT 130 MM HG: CPT | Mod: CPTII,S$GLB,, | Performed by: STUDENT IN AN ORGANIZED HEALTH CARE EDUCATION/TRAINING PROGRAM

## 2023-08-02 PROCEDURE — 99999 PR PBB SHADOW E&M-EST. PATIENT-LVL IV: ICD-10-PCS | Mod: PBBFAC,,, | Performed by: STUDENT IN AN ORGANIZED HEALTH CARE EDUCATION/TRAINING PROGRAM

## 2023-08-02 PROCEDURE — 3288F PR FALLS RISK ASSESSMENT DOCUMENTED: ICD-10-PCS | Mod: CPTII,S$GLB,, | Performed by: STUDENT IN AN ORGANIZED HEALTH CARE EDUCATION/TRAINING PROGRAM

## 2023-08-02 RX ORDER — DEXAMETHASONE 1 MG/1
TABLET ORAL
Qty: 1 TABLET | Refills: 0 | Status: SHIPPED | OUTPATIENT
Start: 2023-08-02 | End: 2023-11-14

## 2023-08-02 RX ORDER — SILDENAFIL CITRATE 20 MG/1
TABLET ORAL
Qty: 30 TABLET | Refills: 3 | Status: SHIPPED | OUTPATIENT
Start: 2023-08-02 | End: 2023-08-09 | Stop reason: SDUPTHER

## 2023-08-02 NOTE — PROGRESS NOTES
[facility] Note    Subjective:    The HPI and pertinent ROS is included in the Diagnostic Impression Remarks section at the end of the note. Please see below for further details. Chief complaint is at end of note.     Umang is a pleasant intelligent patient who is here for evaluation.     Modified Medications    Modified Medication Previous Medication    DEXAMETHASONE (DECADRON) 1 MG TAB dexAMETHasone (DECADRON) 1 MG Tab       Take 1 tablet (1 mg total) by mouth once. Take 1 tablet at 11 pm the night before 8 am lab work for 1 dose    Take 1 tablet (1 mg total) by mouth once. Take 1 tablet at 11 pm the night before 8 am lab work for 1 dose    SILDENAFIL (REVATIO) 20 MG TAB sildenafil (REVATIO) 20 mg Tab       TAKE THREE TABLETS BY MOUTH ONCE DAILY AS NEEDED    TAKE THREE TABLETS BY MOUTH ONCE DAILY AS NEEDED       Data reviewed 274}  Previous medical records reviewed and summarized in plan section at end of note.      If you are due for any health screening(s) below please notify me so we can arrange them to be ordered and scheduled. Most healthy patients at your age complete them, but you are free to accept or refuse. If you can't do it, I'll definitely understand. If you can, I'd certainly appreciate it!     Tests to Keep You Healthy    Eye Exam: Met on 1/18/2023  Colon Cancer Screening: ORDERED  Last Blood Pressure <= 139/89 (8/2/2023): Yes  Last HbA1c < 8 (07/25/2023): Yes  Tobacco Cessation: NO      The following portions of the patient's history were reviewed and updated as appropriate: allergies, current medications, past family history, past medical history, past social history, past surgical history and problem list.    He  has a past medical history of A-fib (1 year), Diabetes mellitus, and Hypertension.  He  has a past surgical history that includes Back surgery; Hernia repair; and Eye surgery.    He  reports that he has been smoking vaping with nicotine and cigarettes. He has been  "exposed to tobacco smoke. He has never used smokeless tobacco. He reports current alcohol use of about 4.0 - 6.0 standard drinks of alcohol per week. He reports current drug use. Drug: Hydrocodone.  He family history includes Psoriasis in his daughter; Skin cancer in his father.    Review of patient's allergies indicates:   Allergen Reactions    Contrast media Hives and Itching     Pt can have IV contrast with premedication       Tobacco Use: High Risk (8/2/2023)    Patient History     Smoking Tobacco Use: Every Day     Smokeless Tobacco Use: Never     Passive Exposure: Current     Physical Examination  General appearance: alert, cooperative, no distress  Neck: no thyromegaly, no neck stiffness  Lungs: clear to auscultation, no wheezes, rales or rhonchi, symmetric air entry  Heart: normal rate, regular rhythm, normal S1, S2, no murmurs, rubs, clicks or gallops nopulse deficit   Abdomen: soft, nontender, nondistended, no rigidity, rebound, or guarding.   Back: no point tenderness over spine  Extremities: peripheral pulses normal, no unilateral leg swelling or calf tenderness   Neurological:alert, oriented, normal speech, no new focal findings or movement disorder noted from baseline    BP Readings from Last 3 Encounters:   08/02/23 (!) 110/58   07/19/23 136/68   02/13/23 (!) 160/78     Wt Readings from Last 3 Encounters:   08/02/23 93 kg (205 lb 0.4 oz)   02/13/23 97.1 kg (214 lb 1.1 oz)   01/17/23 96 kg (211 lb 10.3 oz)     BP (!) 110/58 (BP Location: Right arm, Patient Position: Sitting, BP Method: Large (Manual))   Pulse (!) 57   Temp 98.1 °F (36.7 °C) (Oral)   Resp 18   Ht 5' 11" (1.803 m)   Wt 93 kg (205 lb 0.4 oz)   SpO2 (!) 94%   BMI 28.60 kg/m²    274}  Laboratory: I have reviewed old labs below:    274}    Lab Results   Component Value Date    WBC 6.10 10/28/2022    HGB 14.5 10/28/2022    HCT 44.6 10/28/2022    MCV 90 10/28/2022     10/28/2022     07/25/2023    K 5.1 07/25/2023     " "07/25/2023    CALCIUM 9.4 07/25/2023    PHOS 3.8 09/14/2021    CO2 24 07/25/2023     (H) 07/25/2023    BUN 23 07/25/2023    CREATININE 1.7 (H) 07/25/2023    ANIONGAP 11 07/25/2023    PROT 6.9 07/25/2023    ALBUMIN 3.9 07/25/2023    BILITOT 0.3 07/25/2023    ALKPHOS 72 07/25/2023    ALT 8 (L) 07/25/2023    AST 10 07/25/2023    CHOL 135 07/25/2023    TRIG 204 (H) 07/25/2023    HDL 31 (L) 07/25/2023    LDLCALC 63.2 07/25/2023    TSH 2.077 12/17/2018    PSA 2.6 01/17/2023    HGBA1C 5.9 (H) 01/17/2023     Lab reviewed by me: Particular labs of significance that I will monitor, workup, or treat to improve are mentioned below in diagnostic impression remarks.    Imaging/EKG: I have reviewed the pertinent results and my findings are noted in remarks.  274}    CC:   Chief Complaint   Patient presents with    Follow-up    Diabetes        274}    Assessment/Plan  Umang Goldberg is a 73 y.o. male who presents to clinic with:  1. Chronic midline low back pain, unspecified whether sciatica present    2. Adrenal nodule    3. Hyperlipidemia associated with type 2 diabetes mellitus    4. Type 2 diabetes mellitus with other circulatory complications    5. Hypertension associated with diabetes    6. Erectile dysfunction, unspecified erectile dysfunction type    7. Adrenal adenoma, unspecified laterality    8. Colon cancer screening    9. Nicotine dependence, cigarettes, with unspecified nicotine-induced disorders       274}  Diagnostic Impression Remarks + HPI     Documentation entered by me for this encounter may have been done in part using speech-recognition technology. Although I have made an effort to ensure accuracy, "sound like" errors may exist and should be interpreted in context.     Adrenal nodule- has a ct scan scheduled to further assess also needs to take steroids and antihistamine prior after discussing risk benefits and shared decision making.   He did not take dexa before cortisol will recheck and send dexa to " pharmacy for him to take     Diabetes-stable continue current medications monitor A1c recommend eye exam yearly.  Low glycemic index diet    Patient reports chronic lower back pain radiation He denies bladder or bowel incontinence, urinary retention, saddle anesthesia, and unilateral weakness. No weight loss, fever, chills, or illicit IV drug use. He denies chest pain and diaphoresis. He does not endorse a ripping or tearing sensation. No dysuria or flank pain.   No new falls or trauma take Tylenol also offered other medications such as gabapentin could consider physical therapy wants to hold off on now    Hypertension stable continue current meds monitor no headache or chest pain f/u blood work   Health maintenance-recommend [facility]    Patient has a known AAA and back pain. Although back pain can suggest possible expansion or rupture, I do not believe patient's AAA is symptomatic. The patient does not have a pulsatile abdominal mass, tearing or ripping pain radiating to the back, no pulse deficit, or unstable vitals. I did not appreciate a new neuro deficit. Situation discussed with patient. They state they understand and after shared decision making wish to proceed with the current treatment plan.      Assistance with smoking cessation was offered, including:  [x]  Medications  [x]  Counseling  []  Printed Information on Smoking Cessation  []  Referral to a Smoking Cessation Program    Patient was counseled regarding smoking for >10 minutes.    This is the extent of this pleasant patient's concerns at this present time. He did not feel chest pain upon exertion, dyspnea, nausea, vomiting, diaphoresis, or syncope. No pleuritic chest pain, unilateral leg swelling, calf tenderness, or calf pain. Negative for unintentional weight loss night sweats, hematuria, and fevers. Umang will return to clinic in a few months for further workup and reassessment or  sooner as needed. He was instructed to call the clinic or go to the emergency department or urgent care immediately if his symptoms do not improve, worsens, or if any new symptoms develop. As we discussed that symptoms could worsen over the next 24 hours he was advised that if any increased swelling, pain, or numbness arise to go immediately to the ED. Patient knows to call any time if an emergency arises. Shared decision making occurred and he verbalized understanding in agreement with this plan. I discussed imaging findings, diagnosis, possibilities, treatment options, medications, risks, and benefits. He had many questions regarding the options and long-term effects. All questions were answered. He expressed understanding after counseling regarding the diagnosis and recommendations. He was capable and demonstrated competence with understanding of these options. Shared decision making was performed resulting in him choosing the current treatment plan. Patient handout was given with instructions and recommendations. Advised the patient that if they become pregnant to alert us immediately to assess for medication changes. I also discussed the importance of close follow up to discuss labs, change or modify his medications if needed, monitor side effects, and further evaluation of medical problems.     Additional workup planned: see labs ordered below.    See below for labs and meds ordered with associated diagnosis      1. Adrenal nodule  - CORTISOL, 8AM; Future    2. Hyperlipidemia associated with type 2 diabetes mellitus    3. Type 2 diabetes mellitus with other circulatory complications    4. Hypertension associated with diabetes    5. Erectile dysfunction, unspecified erectile dysfunction type  - sildenafil (REVATIO) 20 mg Tab; TAKE THREE TABLETS BY MOUTH ONCE DAILY AS NEEDED  Dispense: 30 tablet; Refill: 3    6. Chronic midline low back pain, unspecified whether sciatica present    7. Adrenal adenoma,  unspecified laterality  - dexAMETHasone (DECADRON) 1 MG Tab; Take 1 tablet (1 mg total) by mouth once. Take 1 tablet at 11 pm the night before 8 am lab work for 1 dose  Dispense: 1 tablet; Refill: 0    8. Colon cancer screening  - Cologuard Screening (Multitarget Stool DNA); Future  - Cologuard Screening (Multitarget Stool DNA)    9. Nicotine dependence, cigarettes, with unspecified nicotine-induced disorders      Larry Thompson MD   274}    If you are due for any health screening(s) below please notify me so we can arrange them to be ordered and scheduled. Most healthy patients at your age complete them, but you are free to accept or refuse.     If you can't do it, I'll definitely understand. If you can, I'd certainly appreciate it!   Tests to Keep You Healthy    Eye Exam: Met on 1/18/2023  Colon Cancer Screening: ORDERED  Last Blood Pressure <= 139/89 (8/2/2023): Yes  Last HbA1c < 8 (07/25/2023): Yes  Tobacco Cessation: NO

## 2023-08-07 ENCOUNTER — HOSPITAL ENCOUNTER (OUTPATIENT)
Dept: RADIOLOGY | Facility: HOSPITAL | Age: 73
Discharge: HOME | End: 2023-08-07
Attending: STUDENT IN AN ORGANIZED HEALTH CARE EDUCATION/TRAINING PROGRAM
Payer: MEDICARE

## 2023-08-07 DIAGNOSIS — E27.8 ADRENAL MASS: ICD-10-CM

## 2023-08-08 ENCOUNTER — TELEPHONE (OUTPATIENT)
Dept: FAMILY MEDICINE | Facility: CLINIC | Age: 73
End: 2023-08-08
Payer: MEDICARE

## 2023-08-08 ENCOUNTER — HOSPITAL ENCOUNTER (OUTPATIENT)
Dept: RADIOLOGY | Facility: CLINIC | Age: 73
Discharge: HOME | End: 2023-08-08
Attending: STUDENT IN AN ORGANIZED HEALTH CARE EDUCATION/TRAINING PROGRAM
Payer: MEDICARE

## 2023-08-08 DIAGNOSIS — N52.9 ERECTILE DYSFUNCTION, UNSPECIFIED ERECTILE DYSFUNCTION TYPE: ICD-10-CM

## 2023-08-08 DIAGNOSIS — S32.020K CLOSED COMPRESSION FRACTURE OF L2 LUMBAR VERTEBRA WITH NONUNION, SUBSEQUENT ENCOUNTER: ICD-10-CM

## 2023-08-08 DIAGNOSIS — M81.6 LOCALIZED OSTEOPOROSIS (LEQUESNE): ICD-10-CM

## 2023-08-08 PROCEDURE — 77080 DXA BONE DENSITY AXIAL: CPT | Mod: 26,,, | Performed by: RADIOLOGY

## 2023-08-08 PROCEDURE — 77080 DXA BONE DENSITY AXIAL SKELETON 1 OR MORE SITES: ICD-10-PCS | Mod: 26,,, | Performed by: RADIOLOGY

## 2023-08-08 PROCEDURE — 77080 DXA BONE DENSITY AXIAL: CPT | Mod: TC,PO

## 2023-08-08 NOTE — TELEPHONE ENCOUNTER
----- Message from Maura Hayes sent at 8/8/2023  3:39 PM CDT -----  Contact: Natasha from [facility]  Type:  Pharmacy Calling to Clarify an RX    Name of Caller:Natasha  Pharmacy Name:[facility]  Prescription Name:sildenafil (REVATIO) 20 mg Tab  What do they need to clarify?:drug covered but requires PA  Best Call Back Number:   Additional Information:  needs to do PA for this meds so it will be covered.  Please advise  Thanks

## 2023-08-09 RX ORDER — SILDENAFIL CITRATE 20 MG/1
TABLET ORAL
Qty: 30 TABLET | Refills: 3 | Status: SHIPPED | OUTPATIENT
Start: 2023-08-09

## 2023-08-09 NOTE — TELEPHONE ENCOUNTER
Pt informed and verbalizes understanding. Pt also states he wanted to inform Dr. Thompson that he was unable to complete CT scan due to his kidney levels being to low. Tech was unable to perform CT scan with contrast. Pt states tech advised he can do the scan without contrast however it would be the same a last test and problem won't benefit pt. Pt states he would also be placed in a donut hole if he completed repeat ct scan and then in the future still needing with contrast. Please advise if any further action is needed.

## 2023-08-23 ENCOUNTER — PATIENT MESSAGE (OUTPATIENT)
Dept: FAMILY MEDICINE | Facility: CLINIC | Age: 73
End: 2023-08-23
Payer: MEDICARE

## 2023-08-25 LAB — NONINV COLON CA DNA+OCC BLD SCRN STL QL: NORMAL

## 2023-10-17 DIAGNOSIS — E11.21 WELL CONTROLLED TYPE 2 DIABETES MELLITUS WITH NEPHROPATHY: ICD-10-CM

## 2023-10-17 RX ORDER — METFORMIN HYDROCHLORIDE 1000 MG/1
1000 TABLET ORAL 2 TIMES DAILY WITH MEALS
Qty: 180 TABLET | Refills: 3 | Status: SHIPPED | OUTPATIENT
Start: 2023-10-17 | End: 2024-10-16

## 2023-10-17 NOTE — TELEPHONE ENCOUNTER
No care due was identified.  [facility] Embedded Care Due Messages. Reference number: [id number].   10/17/2023 10:46:45 AM CDT

## 2023-11-13 NOTE — PROGRESS NOTES
ENDOCRINOLOGY NEW PATIENT VISIT: 11/14/2023    Subjective:      Patient ID: Umang Goldberg is a 73 y.o. male.    Chief Complaint:  Adrenal nodules    History of Present Illness  Umang Goldberg has a medical history of HTN, HLD, Type 2 diabetes, compression fracture and adrenal incidentaloma's bilaterally seen on abdominal imaging since 2021.  Now referred to endocrinology for further evaluation after cortisol labs have been abnormal.      Regarding Adrenal Incidentaloma:     He has had imaging of the abdomen showing adrenal nodules BL since at least 2021.  Recently had a E-Consult with Dr. Akins with suggestion to obtain a dedicated adrenal CT scan but due to his renal function this was not completed.  He has a history of compression fracture in 2021, they offered a kyphoplasty but he refused.  He has had at least one set of cortisol labs return abnormal after dexamethasone suppression testing.      - First found to have an incidental adrenal nodule on CT scan in September, 2021    - CT Abdomen/Pelvis Without Contrast: 09/13/2021  Impression:   2.2 cm right adrenal nodule Hounsfield unit 57 and 2 cm left adrenal nodule Hounsfield unit 60.      - CT Abdomen/Pelvis Without Contrast: 01/19/2023  Impression:  There are bilateral adrenal adenomas the largest of which measures 2.0 cm and is seen on the left.    - Baseline ACTH:  24  - Baseline Cortisol: 14.7  - Baseline DHEAS:  78  - Last BMD: Normal     - DST results: Cortisol 2.2 as below    Lab Results   Component Value Date    LABCORT 2.20 (L) 08/08/2023    LABCORT 14.7 07/25/2023    CORTISOLSALI Test Not Performed 01/18/2023    ALDOSTERONE 5.9 01/17/2023    ALDOSTERONE 8.4 01/17/2023    LABRENI 3.1 01/17/2023    METANEPHRINE 378 (H) 01/17/2023    ACTH 24 07/25/2023    DHEASO4 78 07/25/2023        Latest Reference Range & Units 01/17/23 15:13   Metanephrine, Free < OR = 57 pg/mL 102 (H)   Normetanephrine, Free < OR = 148 pg/mL 276 (H)   Metanephrine, Total, Plasma < OR =  205 pg/mL 378 (H)   (H): Data is abnormally high    Urine Metanephrines: spot testing       01/18/23 00:00   AM Cortisol Test Not Performed   Cortisol, Saliva Test Not Performed   Midnight Cortisol Test Not Performed   PM Cortisol Test Not Performed       - Possible symptoms of excess cortisol or pheochromocytoma:  No   Yes  [x]    []  Abdominal discomfort  []    [x]  Hypertension  [x]    []  Paroxysmal symptoms (syncope, pallor, dizziness)  []    [x]  Palpitations - A. Fib hx  []    [x]  Diabetes or new hyperglycemia - is on Metformin 1000 mg BID  [x]    []  Polyuria, polydipsia, nocturia, unexplained weight loss or blurred vision  []    [x]  Easy bruisability - On Eliquis  [x]    []  Abnormal stretch marks  []    [x]  Muscle weakness   []    [x]  Fractures or osteoporosis (compression fx hx)    - Contributing factors:    No   Yes  []    [x]  Hyperlipidemia  [x]    []  Weight gain  [x]    []  Atherosclerosis  [x]    []  Potassium abnormalities  [x]    []  Recent Steroids  [x]    []  Recent Clonidine  [x]    []  Recent Spironolactone  [x]    []  Family history of adrenocortical carcinoma    Lab Results   Component Value Date    HGBA1C 5.8 (H) 08/08/2023       He had a vertebral fracture after trying to lift up his  to move it and he had pain in the back.      09/13/2021:  Lumbar Xray  Impression:  New mild compression of the L2 vertebral body, age indeterminate. This was not seen in 2014. Chronic degenerative disc disease with spondylosis at L4-5 and L5-S1 with spondylitic spurring noted from L2-L5.     08/08/2023:  Bone Density Scan  FINDINGS:  The L1 to L4 vertebral bone mineral density is equal to 1.196 g/cm squared with a T score of 1.0.  The left femoral neck bone mineral density is equal to 0.814 g/cm squared with a T score of -0.9.  There is a 8% risk of a major osteoporotic fracture and a 2% risk of hip fracture in the next 10 years (FRAX).  Impression:  No evidence of significant bone density  "loss    ROS:   As above    Objective:     BP (!) 120/54   Pulse 70   Wt 97 kg (213 lb 13.5 oz)   SpO2 96%   BMI 29.83 kg/m²   BP Readings from Last 3 Encounters:   11/14/23 (!) 120/54   08/02/23 (!) 110/58   07/19/23 136/68     Wt Readings from Last 1 Encounters:   11/14/23 1117 97 kg (213 lb 13.5 oz)     Body mass index is 29.83 kg/m².    Physical Exam  Vitals reviewed.   Constitutional:       Appearance: Normal appearance.   Eyes:      Extraocular Movements: Extraocular movements intact.   Pulmonary:      Effort: Pulmonary effort is normal.   Abdominal:      Comments: No abdominal striae noted   Neurological:      General: No focal deficit present.      Mental Status: He is alert.   Psychiatric:         Mood and Affect: Mood normal.         Behavior: Behavior normal.        Lab Review:   Lab Results   Component Value Date    HGBA1C 5.8 (H) 08/08/2023     Lab Results   Component Value Date    CHOL 135 07/25/2023    HDL 31 (L) 07/25/2023    LDLCALC 63.2 07/25/2023    TRIG 204 (H) 07/25/2023    CHOLHDL 23.0 07/25/2023     Lab Results   Component Value Date     07/25/2023    K 5.1 07/25/2023     07/25/2023    CO2 24 07/25/2023     (H) 07/25/2023    BUN 23 07/25/2023    CREATININE 1.7 (H) 07/25/2023    CALCIUM 9.4 07/25/2023    PROT 6.9 07/25/2023    ALBUMIN 3.9 07/25/2023    BILITOT 0.3 07/25/2023    ALKPHOS 72 07/25/2023    AST 10 07/25/2023    ALT 8 (L) 07/25/2023    ANIONGAP 11 07/25/2023    ESTGFRAFRICA >60 04/12/2022    EGFRNONAA >60 04/12/2022    TSH 2.077 12/17/2018     No results found for: "RSDSYSZS56DA"    Assessment and Plan     Adrenal incidentaloma  Labs and imaging reviewed from prior.  Has had mildly elevated plasma metanephrines (not 2-3 x ULN).  Did have urine spot metanephrine levels which were WNL.  Dexamethasone suppression testing did not show full suppression of cortisol to less than 1.8 (was 2.2) but no dexamethasone level obtained with that lab draw.  Aldosterone to renin " ratios have been normal.  No concerning symptoms/      Plan:  - Repeat dexamethasone suppression testing along with dexamethasone level  - If abnormal DST then we will pursue salivary and urine cortisol testing  - Can continue yearly monitoring of chest CT for smoking history which will include adrenal glands (has remained stable compared to prior).      Type 2 diabetes mellitus with other circulatory complications  Remains controlled on Meformin alone.  Continue management with PCP for now.  If renal function worsens then dose of Metformin may need to be adjusted.      Lab Results   Component Value Date    HGBA1C 5.8 (H) 08/08/2023         Hypertension associated with diabetes  Blood pressure at goal today in the clinic.  On Lisinopril and Metoprolol.  Aldosterone and renin levels not concerning.      RTC in 1 year    Nicholas Manuel, DO [facility], [address]  [address] [place], [address]    Office: [phone]  Fax: [phone]

## 2023-11-14 ENCOUNTER — OFFICE VISIT (OUTPATIENT)
Dept: ENDOCRINOLOGY | Facility: CLINIC | Age: 73
End: 2023-11-14
Payer: MEDICARE

## 2023-11-14 VITALS
BODY MASS INDEX: 29.83 KG/M2 | SYSTOLIC BLOOD PRESSURE: 120 MMHG | DIASTOLIC BLOOD PRESSURE: 54 MMHG | OXYGEN SATURATION: 96 % | HEART RATE: 70 BPM | WEIGHT: 213.88 LBS

## 2023-11-14 DIAGNOSIS — I15.2 HYPERTENSION ASSOCIATED WITH DIABETES: ICD-10-CM

## 2023-11-14 DIAGNOSIS — E11.59 TYPE 2 DIABETES MELLITUS WITH OTHER CIRCULATORY COMPLICATIONS: ICD-10-CM

## 2023-11-14 DIAGNOSIS — E27.8 ADRENAL MASS: ICD-10-CM

## 2023-11-14 DIAGNOSIS — E27.8 ADRENAL INCIDENTALOMA: Primary | ICD-10-CM

## 2023-11-14 DIAGNOSIS — E11.59 HYPERTENSION ASSOCIATED WITH DIABETES: ICD-10-CM

## 2023-11-14 PROCEDURE — 3008F PR BODY MASS INDEX (BMI) DOCUMENTED: ICD-10-PCS | Mod: CPTII,GC,S$GLB, | Performed by: STUDENT IN AN ORGANIZED HEALTH CARE EDUCATION/TRAINING PROGRAM

## 2023-11-14 PROCEDURE — 3066F PR DOCUMENTATION OF TREATMENT FOR NEPHROPATHY: ICD-10-PCS | Mod: CPTII,GC,S$GLB, | Performed by: STUDENT IN AN ORGANIZED HEALTH CARE EDUCATION/TRAINING PROGRAM

## 2023-11-14 PROCEDURE — 4010F PR ACE/ARB THEARPY RXD/TAKEN: ICD-10-PCS | Mod: CPTII,GC,S$GLB, | Performed by: STUDENT IN AN ORGANIZED HEALTH CARE EDUCATION/TRAINING PROGRAM

## 2023-11-14 PROCEDURE — 1125F AMNT PAIN NOTED PAIN PRSNT: CPT | Mod: CPTII,GC,S$GLB, | Performed by: STUDENT IN AN ORGANIZED HEALTH CARE EDUCATION/TRAINING PROGRAM

## 2023-11-14 PROCEDURE — 4010F ACE/ARB THERAPY RXD/TAKEN: CPT | Mod: CPTII,GC,S$GLB, | Performed by: STUDENT IN AN ORGANIZED HEALTH CARE EDUCATION/TRAINING PROGRAM

## 2023-11-14 PROCEDURE — 99999 PR PBB SHADOW E&M-EST. PATIENT-LVL IV: ICD-10-PCS | Mod: PBBFAC,GC,, | Performed by: STUDENT IN AN ORGANIZED HEALTH CARE EDUCATION/TRAINING PROGRAM

## 2023-11-14 PROCEDURE — 3074F PR MOST RECENT SYSTOLIC BLOOD PRESSURE < 130 MM HG: ICD-10-PCS | Mod: CPTII,GC,S$GLB, | Performed by: STUDENT IN AN ORGANIZED HEALTH CARE EDUCATION/TRAINING PROGRAM

## 2023-11-14 PROCEDURE — 3074F SYST BP LT 130 MM HG: CPT | Mod: CPTII,GC,S$GLB, | Performed by: STUDENT IN AN ORGANIZED HEALTH CARE EDUCATION/TRAINING PROGRAM

## 2023-11-14 PROCEDURE — 3060F POS MICROALBUMINURIA REV: CPT | Mod: CPTII,GC,S$GLB, | Performed by: STUDENT IN AN ORGANIZED HEALTH CARE EDUCATION/TRAINING PROGRAM

## 2023-11-14 PROCEDURE — 1125F PR PAIN SEVERITY QUANTIFIED, PAIN PRESENT: ICD-10-PCS | Mod: CPTII,GC,S$GLB, | Performed by: STUDENT IN AN ORGANIZED HEALTH CARE EDUCATION/TRAINING PROGRAM

## 2023-11-14 PROCEDURE — 99999 PR PBB SHADOW E&M-EST. PATIENT-LVL IV: CPT | Mod: PBBFAC,GC,, | Performed by: STUDENT IN AN ORGANIZED HEALTH CARE EDUCATION/TRAINING PROGRAM

## 2023-11-14 PROCEDURE — 3288F PR FALLS RISK ASSESSMENT DOCUMENTED: ICD-10-PCS | Mod: CPTII,GC,S$GLB, | Performed by: STUDENT IN AN ORGANIZED HEALTH CARE EDUCATION/TRAINING PROGRAM

## 2023-11-14 PROCEDURE — 3078F DIAST BP <80 MM HG: CPT | Mod: CPTII,GC,S$GLB, | Performed by: STUDENT IN AN ORGANIZED HEALTH CARE EDUCATION/TRAINING PROGRAM

## 2023-11-14 PROCEDURE — 3008F BODY MASS INDEX DOCD: CPT | Mod: CPTII,GC,S$GLB, | Performed by: STUDENT IN AN ORGANIZED HEALTH CARE EDUCATION/TRAINING PROGRAM

## 2023-11-14 PROCEDURE — 3066F NEPHROPATHY DOC TX: CPT | Mod: CPTII,GC,S$GLB, | Performed by: STUDENT IN AN ORGANIZED HEALTH CARE EDUCATION/TRAINING PROGRAM

## 2023-11-14 PROCEDURE — 99204 OFFICE O/P NEW MOD 45 MIN: CPT | Mod: GC,S$GLB,, | Performed by: STUDENT IN AN ORGANIZED HEALTH CARE EDUCATION/TRAINING PROGRAM

## 2023-11-14 PROCEDURE — 1101F PT FALLS ASSESS-DOCD LE1/YR: CPT | Mod: CPTII,GC,S$GLB, | Performed by: STUDENT IN AN ORGANIZED HEALTH CARE EDUCATION/TRAINING PROGRAM

## 2023-11-14 PROCEDURE — 3044F PR MOST RECENT HEMOGLOBIN A1C LEVEL <7.0%: ICD-10-PCS | Mod: CPTII,GC,S$GLB, | Performed by: STUDENT IN AN ORGANIZED HEALTH CARE EDUCATION/TRAINING PROGRAM

## 2023-11-14 PROCEDURE — 1159F MED LIST DOCD IN RCRD: CPT | Mod: CPTII,GC,S$GLB, | Performed by: STUDENT IN AN ORGANIZED HEALTH CARE EDUCATION/TRAINING PROGRAM

## 2023-11-14 PROCEDURE — 1159F PR MEDICATION LIST DOCUMENTED IN MEDICAL RECORD: ICD-10-PCS | Mod: CPTII,GC,S$GLB, | Performed by: STUDENT IN AN ORGANIZED HEALTH CARE EDUCATION/TRAINING PROGRAM

## 2023-11-14 PROCEDURE — 1101F PR PT FALLS ASSESS DOC 0-1 FALLS W/OUT INJ PAST YR: ICD-10-PCS | Mod: CPTII,GC,S$GLB, | Performed by: STUDENT IN AN ORGANIZED HEALTH CARE EDUCATION/TRAINING PROGRAM

## 2023-11-14 PROCEDURE — 3044F HG A1C LEVEL LT 7.0%: CPT | Mod: CPTII,GC,S$GLB, | Performed by: STUDENT IN AN ORGANIZED HEALTH CARE EDUCATION/TRAINING PROGRAM

## 2023-11-14 PROCEDURE — 3288F FALL RISK ASSESSMENT DOCD: CPT | Mod: CPTII,GC,S$GLB, | Performed by: STUDENT IN AN ORGANIZED HEALTH CARE EDUCATION/TRAINING PROGRAM

## 2023-11-14 PROCEDURE — 99204 PR OFFICE/OUTPT VISIT, NEW, LEVL IV, 45-59 MIN: ICD-10-PCS | Mod: GC,S$GLB,, | Performed by: STUDENT IN AN ORGANIZED HEALTH CARE EDUCATION/TRAINING PROGRAM

## 2023-11-14 PROCEDURE — 3078F PR MOST RECENT DIASTOLIC BLOOD PRESSURE < 80 MM HG: ICD-10-PCS | Mod: CPTII,GC,S$GLB, | Performed by: STUDENT IN AN ORGANIZED HEALTH CARE EDUCATION/TRAINING PROGRAM

## 2023-11-14 PROCEDURE — 3060F PR POS MICROALBUMINURIA RESULT DOCUMENTED/REVIEW: ICD-10-PCS | Mod: CPTII,GC,S$GLB, | Performed by: STUDENT IN AN ORGANIZED HEALTH CARE EDUCATION/TRAINING PROGRAM

## 2023-11-14 RX ORDER — DEXAMETHASONE 1 MG/1
1 TABLET ORAL ONCE
Qty: 1 TABLET | Refills: 0 | Status: SHIPPED | OUTPATIENT
Start: 2023-11-14 | End: 2023-11-14

## 2023-11-14 NOTE — ASSESSMENT & PLAN NOTE
Remains controlled on Meformin alone.  Continue management with PCP for now.  If renal function worsens then dose of Metformin may need to be adjusted.      Lab Results   Component Value Date    HGBA1C 5.8 (H) 08/08/2023

## 2023-11-14 NOTE — ASSESSMENT & PLAN NOTE
Blood pressure at goal today in the clinic.  On Lisinopril and Metoprolol.  Aldosterone and renin levels not concerning.

## 2023-11-14 NOTE — PATIENT INSTRUCTIONS
As discussed we would like to repeat one of the tests where you take a pill the night before and then get a lab draw the next morning.  Instructions for that are as below.  If this result comes low we would look at repeating the saliva test as well as a urine cortisol collection (24 hour).      Dexamethasone Suppression Test:    - Take 1 mg tablet of dexamethasone by mouth at exactly 11:00 p.m. the night before your 8:00 a.m. blood test is scheduled  - Have blood drawn exactly at 8:00 a.m. as the timing of the medication and blood draw are very important    - I have sent a prescription for 1 mg of dexamethasone (one dose) to your pharmacy  - A normal response to taking the dexamethasone is a low cortisol level the next day. Do not be alarmed if your cortisol level is marked as abnormally low because that is a normal response

## 2023-11-15 NOTE — PROGRESS NOTES
I have seen the patient, reviewed the fellow's history and physical, assessment, plan, and progress note. I have personally interviewed and examined the patient at bedside and agree with the findings.     Stu Brunson MD  Endocrinology Staff

## 2023-11-17 ENCOUNTER — LAB VISIT (OUTPATIENT)
Dept: LAB | Facility: HOSPITAL | Age: 73
End: 2023-11-17
Attending: STUDENT IN AN ORGANIZED HEALTH CARE EDUCATION/TRAINING PROGRAM
Payer: MEDICARE

## 2023-11-17 DIAGNOSIS — E27.8 ADRENAL MASS: ICD-10-CM

## 2023-11-17 LAB — CORTIS SERPL-MCNC: 1.9 UG/DL (ref 4.3–22.4)

## 2023-11-17 PROCEDURE — 82542 COL CHROMOTOGRAPHY QUAL/QUAN: CPT | Performed by: STUDENT IN AN ORGANIZED HEALTH CARE EDUCATION/TRAINING PROGRAM

## 2023-11-17 PROCEDURE — 36415 COLL VENOUS BLD VENIPUNCTURE: CPT | Mod: PO | Performed by: STUDENT IN AN ORGANIZED HEALTH CARE EDUCATION/TRAINING PROGRAM

## 2023-11-17 PROCEDURE — 82533 TOTAL CORTISOL: CPT | Performed by: STUDENT IN AN ORGANIZED HEALTH CARE EDUCATION/TRAINING PROGRAM

## 2023-11-28 LAB — DEXAMETHASONE SERPL-MCNC: 227 NG/DL

## 2023-12-03 DIAGNOSIS — I15.2 HYPERTENSION ASSOCIATED WITH DIABETES: ICD-10-CM

## 2023-12-03 DIAGNOSIS — E11.59 HYPERTENSION ASSOCIATED WITH DIABETES: ICD-10-CM

## 2023-12-04 RX ORDER — LISINOPRIL 20 MG/1
20 TABLET ORAL DAILY
Qty: 90 TABLET | Refills: 2 | Status: SHIPPED | OUTPATIENT
Start: 2023-12-04

## 2023-12-04 NOTE — TELEPHONE ENCOUNTER
Refill Routing Note   Medication(s) are not appropriate for processing by [facility] for the following reason(s):        Required labs abnormal  No active prescription written by provider    [facility] action(s):  Defer        Medication Therapy Plan: Patient has been following with PCP but no order written by PCP      Appointments  past 12m or future 3m with PCP    Date Provider   Last Visit   8/2/2023 Larry Thompson MD   Next Visit   3/7/2024 Larry Thompson MD   ED visits in past 90 days: 0        Note composed:9:43 AM 12/04/2023

## 2023-12-05 DIAGNOSIS — E27.8 ADRENAL INCIDENTALOMA: Primary | ICD-10-CM

## 2023-12-05 DIAGNOSIS — R94.7 ABNORMAL DEXAMETHASONE SUPPRESSION TEST: ICD-10-CM

## 2024-01-11 ENCOUNTER — PATIENT MESSAGE (OUTPATIENT)
Dept: FAMILY MEDICINE | Facility: CLINIC | Age: 74
End: 2024-01-11
Payer: COMMERCIAL

## 2024-01-11 DIAGNOSIS — Z00.00 ENCOUNTER FOR MEDICARE ANNUAL WELLNESS EXAM: ICD-10-CM

## 2024-01-12 ENCOUNTER — PATIENT MESSAGE (OUTPATIENT)
Dept: ENDOCRINOLOGY | Facility: CLINIC | Age: 74
End: 2024-01-12
Payer: COMMERCIAL

## 2024-01-26 LAB — HEMOCCULT STL QL IA: NEGATIVE

## 2024-02-12 ENCOUNTER — HOSPITAL ENCOUNTER (OUTPATIENT)
Dept: RADIOLOGY | Facility: HOSPITAL | Age: 74
Discharge: HOME | End: 2024-02-12
Attending: INTERNAL MEDICINE
Payer: MEDICARE

## 2024-02-12 DIAGNOSIS — Z72.0 TOBACCO ABUSE: ICD-10-CM

## 2024-02-12 DIAGNOSIS — R91.8 LUNG NODULES: Primary | ICD-10-CM

## 2024-02-12 DIAGNOSIS — R19.00 ABDOMINAL MASS, UNSPECIFIED ABDOMINAL LOCATION: Primary | ICD-10-CM

## 2024-02-12 DIAGNOSIS — Z87.891 PERSONAL HISTORY OF NICOTINE DEPENDENCE: ICD-10-CM

## 2024-02-12 DIAGNOSIS — Z91.041 CONTRAST MEDIA ALLERGY: ICD-10-CM

## 2024-02-12 PROCEDURE — 71271 CT THORAX LUNG CANCER SCR C-: CPT | Mod: 26,,, | Performed by: RADIOLOGY

## 2024-02-12 PROCEDURE — 71271 CT THORAX LUNG CANCER SCR C-: CPT | Mod: TC

## 2024-02-12 RX ORDER — PREDNISONE 50 MG/1
TABLET ORAL
Qty: 3 TABLET | Refills: 0 | Status: SHIPPED | OUTPATIENT
Start: 2024-02-12 | End: 2024-03-07

## 2024-02-12 RX ORDER — DIPHENHYDRAMINE HCL 50 MG
50 CAPSULE ORAL
Qty: 1 CAPSULE | Refills: 0 | Status: SHIPPED | OUTPATIENT
Start: 2024-02-12 | End: 2024-03-07

## 2024-02-14 DIAGNOSIS — E11.9 TYPE 2 DIABETES MELLITUS WITHOUT COMPLICATION, UNSPECIFIED WHETHER LONG TERM INSULIN USE: ICD-10-CM

## 2024-02-15 DIAGNOSIS — F17.210 NICOTINE DEPENDENCE, CIGARETTES, UNCOMPLICATED: Primary | ICD-10-CM

## 2024-03-04 DIAGNOSIS — J44.9 CHRONIC OBSTRUCTIVE PULMONARY DISEASE, UNSPECIFIED COPD TYPE: ICD-10-CM

## 2024-03-04 RX ORDER — ALBUTEROL SULFATE 90 UG/1
2 AEROSOL, METERED RESPIRATORY (INHALATION) EVERY 6 HOURS PRN
Qty: 18 G | Refills: 6 | Status: SHIPPED | OUTPATIENT
Start: 2024-03-04 | End: 2024-03-07

## 2024-03-07 ENCOUNTER — OFFICE VISIT (OUTPATIENT)
Dept: FAMILY MEDICINE | Facility: CLINIC | Age: 74
End: 2024-03-07
Payer: MEDICARE

## 2024-03-07 VITALS
WEIGHT: 215.63 LBS | SYSTOLIC BLOOD PRESSURE: 122 MMHG | HEIGHT: 71 IN | TEMPERATURE: 98 F | HEART RATE: 74 BPM | RESPIRATION RATE: 17 BRPM | DIASTOLIC BLOOD PRESSURE: 58 MMHG | BODY MASS INDEX: 30.19 KG/M2 | OXYGEN SATURATION: 95 %

## 2024-03-07 DIAGNOSIS — Z00.00 HEALTHCARE MAINTENANCE: Primary | ICD-10-CM

## 2024-03-07 DIAGNOSIS — K76.0 FATTY LIVER: ICD-10-CM

## 2024-03-07 DIAGNOSIS — F17.219 NICOTINE DEPENDENCE, CIGARETTES, WITH UNSPECIFIED NICOTINE-INDUCED DISORDERS: ICD-10-CM

## 2024-03-07 DIAGNOSIS — I48.0 PAROXYSMAL ATRIAL FIBRILLATION: ICD-10-CM

## 2024-03-07 DIAGNOSIS — E11.69 HYPERLIPIDEMIA ASSOCIATED WITH TYPE 2 DIABETES MELLITUS: ICD-10-CM

## 2024-03-07 DIAGNOSIS — Z12.11 COLON CANCER SCREENING: ICD-10-CM

## 2024-03-07 DIAGNOSIS — R79.9 ABNORMAL FINDING OF BLOOD CHEMISTRY, UNSPECIFIED: ICD-10-CM

## 2024-03-07 DIAGNOSIS — I15.2 HYPERTENSION ASSOCIATED WITH DIABETES: ICD-10-CM
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
## 2024-03-07 DIAGNOSIS — R94.4 DECREASED GFR: ICD-10-CM

## 2024-03-07 DIAGNOSIS — E27.8 ADRENAL MASS: ICD-10-CM

## 2024-03-07 DIAGNOSIS — E78.5 HYPERLIPIDEMIA ASSOCIATED WITH TYPE 2 DIABETES MELLITUS: ICD-10-CM

## 2024-03-07 DIAGNOSIS — E11.59 HYPERTENSION ASSOCIATED WITH DIABETES: ICD-10-CM

## 2024-03-07 DIAGNOSIS — J44.9 CHRONIC OBSTRUCTIVE PULMONARY DISEASE, UNSPECIFIED COPD TYPE: ICD-10-CM

## 2024-03-07 DIAGNOSIS — M54.42 CHRONIC MIDLINE LOW BACK PAIN WITH LEFT-SIDED SCIATICA: ICD-10-CM

## 2024-03-07 DIAGNOSIS — I70.0 ATHEROSCLEROSIS OF AORTA: ICD-10-CM

## 2024-03-07 DIAGNOSIS — G89.29 CHRONIC MIDLINE LOW BACK PAIN WITH LEFT-SIDED SCIATICA: ICD-10-CM

## 2024-03-07 DIAGNOSIS — R79.89 ELEVATED CORTISOL LEVEL: ICD-10-CM

## 2024-03-07 DIAGNOSIS — E11.59 TYPE 2 DIABETES MELLITUS WITH OTHER CIRCULATORY COMPLICATIONS: ICD-10-CM

## 2024-03-07 DIAGNOSIS — I77.819 AORTIC ECTASIA: ICD-10-CM

## 2024-03-07 PROCEDURE — 99215 OFFICE O/P EST HI 40 MIN: CPT | Mod: S$GLB,,, | Performed by: STUDENT IN AN ORGANIZED HEALTH CARE EDUCATION/TRAINING PROGRAM

## 2024-03-07 PROCEDURE — 99999 PR PBB SHADOW E&M-EST. PATIENT-LVL V: CPT | Mod: PBBFAC,,, | Performed by: STUDENT IN AN ORGANIZED HEALTH CARE EDUCATION/TRAINING PROGRAM

## 2024-03-07 PROCEDURE — 99407 BEHAV CHNG SMOKING > 10 MIN: CPT | Mod: S$GLB,,, | Performed by: STUDENT IN AN ORGANIZED HEALTH CARE EDUCATION/TRAINING PROGRAM

## 2024-03-07 RX ORDER — ALBUTEROL SULFATE 0.63 MG/3ML
0.63 SOLUTION RESPIRATORY (INHALATION) EVERY 6 HOURS PRN
COMMUNITY

## 2024-03-07 NOTE — PROGRESS NOTES
[facility] Note    Subjective:    The HPI and pertinent ROS is included in the Diagnostic Impression Remarks section at the end of the note. Please see below for further details. Chief complaint is at end of note.     Umang is a pleasant intelligent patient who is here for evaluation.     Modified Medications    No medications on file       Data reviewed 274}  Previous medical records reviewed and summarized in plan section at end of note.      If you are due for any health screening(s) below please notify me so we can arrange them to be ordered and scheduled. Most healthy patients at your age complete them, but you are free to accept or refuse. If you can't do it, I'll definitely understand. If you can, I'd certainly appreciate it!     Tests to Keep You Healthy    Eye Exam: ORDERED BUT NOT SCHEDULED  Colon Cancer Screening: ORDERED  Last Blood Pressure <= 139/89 (3/7/2024): Yes  Last HbA1c < 8 (09/22/2023): Yes      The following portions of the patient's history were reviewed and updated as appropriate: allergies, current medications, past family history, past medical history, past social history, past surgical history and problem list.    He  has a past medical history of A-fib (1 year), Diabetes mellitus, and Hypertension.  He  has a past surgical history that includes Back surgery; Hernia repair; and Eye surgery.    He  reports that he has been smoking vaping with nicotine and cigarettes. He has been exposed to tobacco smoke. He has never used smokeless tobacco. He reports current alcohol use of about 4.0 - 6.0 standard drinks of alcohol per week. He reports current drug use. Drug: Hydrocodone.  He family history includes Psoriasis in his daughter; Skin cancer in his father.    Review of patient's allergies indicates:   Allergen Reactions    Contrast media Hives and Itching     Pt can have IV contrast with premedication       Tobacco Use: High Risk (3/7/2024)    Patient History     Smoking  "Tobacco Use: Every Day     Smokeless Tobacco Use: Never     Passive Exposure: Current     Physical Examination  Physical Exam  Vital Signs  Blood pressure is 122/58.     General appearance: alert, cooperative, no distress  Neck: no thyromegaly, no neck stiffness  Lungs: clear to auscultation, no wheezes, rales or rhonchi, symmetric air entry  Heart: normal rate, regular rhythm, normal S1, S2, no murmurs, rubs, clicks or gallops  Abdomen: soft, nontender, nondistended, no rigidity, rebound, or guarding.   Back: no point tenderness over spine  Extremities: peripheral pulses normal, no unilateral leg swelling or calf tenderness   Neurological:alert, oriented, normal speech, no new focal findings or movement disorder noted from baseline      BP Readings from Last 3 Encounters:   03/07/24 (!) 122/58   11/14/23 (!) 120/54   08/02/23 (!) 110/58     Wt Readings from Last 3 Encounters:   03/07/24 97.8 kg (215 lb 9.8 oz)   11/14/23 97 kg (213 lb 13.5 oz)   08/02/23 93 kg (205 lb 0.4 oz)     BP (!) 122/58 (BP Location: Right arm, Patient Position: Sitting, BP Method: Medium (Manual))   Pulse 74   Temp 97.9 °F (36.6 °C) (Oral)   Resp 17   Ht 5' 11" (1.803 m)   Wt 97.8 kg (215 lb 9.8 oz)   SpO2 95%   BMI 30.07 kg/m²    274}  Laboratory: I have reviewed old labs below:    274}    Lab Results   Component Value Date    WBC 6.10 10/28/2022    HGB 14.5 10/28/2022    HCT 44.6 10/28/2022    MCV 90 10/28/2022     10/28/2022     07/25/2023    K 5.1 07/25/2023     07/25/2023    CALCIUM 9.4 07/25/2023    PHOS 3.8 09/14/2021    CO2 24 07/25/2023     (H) 07/25/2023    BUN 23 07/25/2023    CREATININE 1.7 (H) 07/25/2023    EGFRNORACEVR 42 (A) 07/25/2023    ANIONGAP 11 07/25/2023    PROT 6.9 07/25/2023    ALBUMIN 3.9 07/25/2023    BILITOT 0.3 07/25/2023    ALKPHOS 72 07/25/2023    ALT 8 (L) 07/25/2023    AST 10 07/25/2023    CHOL 135 07/25/2023    TRIG 204 (H) 07/25/2023    HDL 31 (L) 07/25/2023    LDLCALC 63.2 " 07/25/2023    TSH 2.077 12/17/2018    PSA 2.6 01/17/2023    HGBA1C 5.8 (H) 08/08/2023      Lab reviewed by me: Particular labs of significance that I will monitor, workup, or treat to improve are mentioned below in diagnostic impression remarks.    Results  Imaging  Abdominal ultrasound was reviewed with the patient.       Imaging/EKG: I have reviewed the pertinent results and my findings are noted in remarks.  274}    CC:   Chief Complaint   Patient presents with    Follow-up     6 month f/u    Hyperlipidemia        274}    History of Present Illness  The patient is a 74-year-old male who presents for evaluation of multiple medical concerns.    He slipped and his left leg gave out and he fell a week ago. He grabbed the side of the bed. No chest pain. He did not feel dizzy or lightheaded. His back has been the same since the fall. He has had trouble with his back ever since he had laminectomy on his L4-L5 and it has not gotten any better. The pain does radiate down his left leg sometimes. He describes the pain as burning and tingling. He has to get up and move around a little bit, but he can not move too much, walk or stand for long. He has tried gabapentin with hydrocodone. Hydrocodone helped, but he does not want to keep taking that because it makes him nauseated. He has been using Aspercreme with lidocaine in it, which helps a lot. He has not tried a muscle relaxer. He did not hit his head. He did not have a headache with the fall.    He needs to get an appointment with endocrinology. He has been fighting with them for a couple of months now. They wanted to do a 24-hour urine test, but he could not do it because of his back. He had a saliva test, but he could not remember if it was contaminated or came back. He was told they would do a 4-hour urine test, but he did not hear anything from them.  He had a FIT kit on 01/23/2024, which was negative.   [facility]          Assessment/Plan  Umang  PAULINE Goldberg is a 73 y.o. male who presents to clinic with:  1. Healthcare maintenance    2. Fall, initial encounter    3. Hyperlipidemia associated with type 2 diabetes mellitus    4. Hypertension associated with diabetes    5. Type 2 diabetes mellitus with other circulatory complications    6. Paroxysmal atrial fibrillation    7. Fatty liver    8. Chronic obstructive pulmonary disease, unspecified COPD type    9. Adrenal mass    10. Atherosclerosis of aorta    11. Chronic midline low back pain with left-sided sciatica    12. Nicotine dependence, cigarettes, with unspecified nicotine-induced disorders    13. Elevated cortisol level    14. Abnormal finding of blood chemistry, unspecified    15. Decreased GFR    16. Aortic ectasia    17. Colon cancer screening       274}    Assessment & Plan  1. Back pain.   Continue you recommend physical therapy you can try lidocaine patch topical medications did not want to try gabapentin or Lyrica relaxer he wants to hold off on this      3. Adrenal nodule.  He needs to get another imaging to look at the nodule to make sure it is not changing. Get urine test f/u endo        Fall- stable no head trauma vitals stable   Hypertension stable continue current meds monitor no headache or chest pain f/u blood work   Diabetes-stable continue current medications monitor A1c recommend eye exam yearly.  Low glycemic index diet    COPD-stable continue current inhalers no recent flares  Atherosclerosis of aorta- stable continue statin and rec healthy diet monitor follow lipid levels     Adrenal nodule stable no symptoms needs urine test f/u with endo placed a ref needs imaging but radiology refused   AFib-stable no recent flares continue current medications monitor  Decreased gfr repeat   Aortic ectasia-stable control blood pressure no symptoms monitor get US  .htn  Nicotine dependence-needs improvement recommend smoking cessation offered medications patient wants to hold off on meds.   Assistance  with smoking cessation was offered, including:  [x]  Medications  [x]  Counseling  []  Printed Information on Smoking Cessation  []  Referral to a Smoking Cessation Program    Patient was counseled regarding smoking for >10 minutes.     Lung nodules- recommend repeat CT scan monitor recommend smoking cessation  Altogether 42 minutes of total time spent on the encounter, which includes face to face time and non-face to face time preparing to see the patient (eg, review of tests), Obtaining and/or reviewing separately obtained history, Documenting clinical information in the electronic or other health record, Independently interpreting results (not separately reported) and communicating results to the patient/family/caregiver, or Care coordination (not separately reported). I also counseled him on common and the most usual side effect of prescribed medications. Risk and benefits of the medication were also discussed. I reviewed previous notes to better coordinate patient's care. He test results and lifestyle changes were also discussed. All questions were answered, and patient voiced understanding.       This note was generated with the assistance of ambient listening technology. Verbal consent was obtained by the patient and accompanying visitor(s) for the recording of patient appointment to facilitate this note. I attest to having reviewed and edited the generated note for accuracy, though some syntax or spelling errors may persist. Please contact the author of this note for any clarification.      1. Fall, initial encounter    2. Healthcare maintenance    3. Hyperlipidemia associated with type 2 diabetes mellitus    4. Hypertension associated with diabetes    5. Type 2 diabetes mellitus with other circulatory complications  - Ambulatory referral/consult to Optometry; Future    6. Paroxysmal atrial fibrillation    7. Fatty liver    8. Chronic obstructive pulmonary disease, unspecified COPD type    9. Adrenal  mass    10. Atherosclerosis of aorta    11. Chronic midline low back pain with left-sided sciatica    12. Nicotine dependence, cigarettes, with unspecified nicotine-induced disorders    13. Elevated cortisol level    14. Abnormal finding of blood chemistry, unspecified  - CBC Auto Differential; Future  - Comprehensive Metabolic Panel; Future  - Hemoglobin A1C; Future  - Lipid Panel; Future  - TSH; Future    15. Decreased GFR    16. Aortic ectasia  - US Abdominal Aorta; Future    17. Colon cancer screening  - Fecal Immunochemical Test (iFOBT); Future      Larry Thompson MD   274}    If you are due for any health screening(s) below please notify me so we can arrange them to be ordered and scheduled. Most healthy patients at your age complete them, but you are free to accept or refuse.     If you can't do it, I'll definitely understand. If you can, I'd certainly appreciate it!   Tests to Keep You Healthy    Eye Exam: ORDERED BUT NOT SCHEDULED  Colon Cancer Screening: ORDERED  Last Blood Pressure <= 139/89 (3/7/2024): Yes  Last HbA1c < 8 (09/22/2023): Yes

## 2024-03-07 NOTE — PATIENT INSTRUCTIONS
Vignesh Heck, Please read below to learn more on healthy habits.  Most of my patients read it.     If you are due for any health screening(s) below please notify me so we can arrange them to be ordered and scheduled. Most healthy patients at your age complete them, but you are free to accept or refuse.     If you can't do it, I'll definitely understand. If you can, I'd certainly appreciate it!     Tests to Keep You Healthy    Eye Exam: ORDERED BUT NOT SCHEDULED  Colon Cancer Screening: ORDERED  Last Blood Pressure <= 139/89 (3/7/2024): Yes  Last HbA1c < 8 (09/22/2023): Yes      Its time for your colon cancer screening     Colorectal cancer is one of the leading causes of cancer death for men and women but it doesnt have to be. Screenings can prevent colorectal cancer or find it early enough to treat and cure the disease.     Our records indicate that you may be overdue for colon cancer screening. A colonoscopy or stool screening test can help identify patients at risk for developing colon cancer. Cancer screenings save lives, so schedule yours today to stay healthy.     A colonoscopy is the preferred test for detecting colon cancer. It is needed only once every 10 years if results are negative. While you are sedated, a flexible, lighted tube with a tiny camera is inserted into the rectum and advanced through the colon to look for cancers.     An alternative screening test that is used at home and returned to the lab may also be used. It detects hidden blood in bowel movements which could indicate cancer in the colon. If results are positive, you will need a colonoscopy to determine if the blood is a sign of cancer. This type of follow up (diagnostic) colonoscopy usually requires additional copays as required by your insurance provider.     If you recently had your colon cancer screening performed outside of [facility], please let your Health care team know so that they can update your health record. Please  contact your PCP if you have any questions.    Your diabetic retinal eye exam is due     Diabetes is the #1 cause of blindness in the [place] - early detection before signs or symptoms develop can prevent debilitating blindness.     Our records indicate that you may be overdue for your annual diabetic eye exam. Eye screening can help identify patients at risk for developing vision loss which is common in diabetes. This simple screening is an important step to keeping you healthy and preventing complications from diabetes.     This recommended diabetic eye exam should take place once per year and can prevent and treat diabetes complications in the eye before developing symptoms. This can be done with a special camera is used to take photographs of the back of your eye without having to dilate them, or you can see an eye doctor for a full dilated exam.     If you recently had your yearly diabetic eye exam performed outside of [facility], please let your Health care team know so that they can update your health record.        Were here to help you quit smoking     Our records indicated that you are still smoking. One of the best things you can do for your health is to stop smoking and we are here to help.     Talk with your provider about our Smoking Cessation Program and how we can support you on your journey.         Table of Contents:     Cancer prevention  Explanation on the different components of your blood work and interpretation  Frequently asked questions   Blood pressure log   E-Visits  E-Consults     Cancer Prevention    Why should you choose to get screened for cancer? One simple reason is because you are important. You matter and deserve to have the best health so you can fulfill your great potential.     Colon Cancer screening - Most colon cancers can be prevented by screening. In most cases a polyp in the colon can grow and is not cancerous at first, but it can become cancerous years later. A  "polyp is like a seed that can grow into a weed if it is left in the soil. If the seed is detected and removed, no weed sprouts. A FIT test/Cologuard test and colonoscopy can detect precancerous polyps and lead to the prevention of cancer. A polyp is just like a seed that can be removed before the roots take hold. A colonoscopy can remove these polyps and eliminate the chance that these polyps turn into cancer.     Cervical Cancer screening- A PAP smear can detect precancerous cells that can become cervical cancer and can lead to procedures to remove them. It is very important to get a PAP smear as investing these few minutes can help prevent a lot of trouble down the road. If you want to do the most you can do to spend more time with your family and friends', cancer screenings can help with that goal.     Breast Cancer screening- Mammograms can detect breast cancer before it has spread and early detection allows the ability to remove the lesion prior to any spread. It is similar to a small rotten spot on fruit. If the spoiled part is removed quickly it can preserve the rest of the fruit, but if it is left alone it will corrupt the whole peach.     Smoking Cessation- "Smoking is like a chimney. The tar builds up and causes a back draft which leads to a cough. Smoking is like sitting in a car with a blocked exhaust system." Smoking can increase your risk for lung, mouth, throat, nose, esophagus, bladder, kidney, ureter, pancreas, stomach, liver, cervix, ovary, bowel, and leukemia [2]. If you have smoked in the past, you might meet the criteria for a CT lung cancer screening.     Lung Cancer Screening - "The U.S. Preventive Services Task Force (USPSTF) recommendsexternal icon yearly lung cancer screening with LDCT for people who--have a 20 pack-year or more smoking history, and smoke now or have quit within the past 15 years, and are between 50 and 80 years old. A pack-year is smoking an average of one pack of " "cigarettes per day for one year. For example, a person could have a 20 pack-year history by smoking one pack a day for 20 years or two packs a day for 10 years." [3]    Hypertension - Common high blood pressure meds may lower colorectal cancer risk-ALONZO, July 6, 2020 - Hypertension Journal Report Medications commonly prescribed to treat high blood pressure may also reduce patients' colorectal cancer risk, according to new research published today in Hypertension, an American Heart Association journal." [4].     Low HbA1c - "Higher HbA1c levels (within both the non?diabetic and diabetic ranges) were associated with the risk of colorectal cancer (Model 2; P linear?=?0.009), especially for colon cancer." [5].    A healthy diet, exercise, limitation of alcohol use, certain infections, avoidance of radiation/environmental toxins, and staying lean lowers your cancer risk [6].     I want to empower you to make informed choices for your health. I have a listed below the most common blood components that we check for when you get blood work. I discuss what each component measures along with common reasons for why they can be abnormal. If you are interested in understanding your blood work better, please read the lab explanation attached below.     Explanation of Lab Results    Please note: This information is included as a reference to help you better understand your lab results and is not be used for diagnosis.     Lipid Panel:  Cholesterol is a measure of cardiac risk and stroke risk. A lipid panel measures total cholesterol and provides readings which are broken down into 3 subsections: Triglycerides, HDL and LDL.    Total Cholesterol: Your total blood cholesterol is a measure of LDL cholesterol, HDL cholesterol, and other lipid components.  If your individual lipid level components are in the normal range and you have an elevated total cholesterol level we are generally not to concerned since we primarily look at the " LDL cholesterol which is considered the bad cholesterol which can lead to heart attacks and strokes.  Your calculated cardiac risk is the most important factor in deciding if you would benefit from a cholesterol-lowering medication that the total cholesterol level.    Triglycerides are most diet and weight sensitive. They are affected easily by lifestyle changes. Ideally, this reading should be less than 150, although if the remainder of the cholesterol panel is within normal limits, we will tolerate upwards of 250-300. For the most part, if triglycerides are the only part of your cholesterol panel reading as 'abnormal', it is best to lose weight and modify your diet, including less saturated fat and less simple sugars. We only start medication to lower this is the level is greater than 500 since this can increase ones risk for pancreatitis. This is not the cholesterol type that leads to heart attacks.     HDL is your 'good cholesterol.' Ideally, the reading should be over 40 and is particularly helpful when it is greater than 60. Research indicates that high HDL is extremely protective; and if your HDL level is greater than 60, we tolerate far worse LDL or triglyceride levels. For the most part, HDL is responsive to aerobic activity and ideal weight. We do not have medicines that increase the HDL reading very easily.    LDL is your 'bad cholesterol.' Our goals depend on how many cardiac risk factors you have.     High LDL: If you are diabetic or have had a heart attack, we like the LDL level to be less than 100. If you have 2 cardiac risk factors (such as diabetes, hypertension, family history, a low HDL and smoking), we like your LDL to be less than 130. If you have 0-2 cardiac risk factors, we like your LDL level to be less than 160, but we may not necessarily initiate medications to 190 unless you cannot lower it. The latest guidelines recommend to consider taking a statin only if your ASCVD cardiac risk score  is at least greater than 7.5% and a strong recommendation if your risk score is greater than 10%.     Low LDL: Normal or low LDL is considered good and having an LDL below the normal range is not of a concern.     Complete Blood Count (CBC):    White blood cells: These are infection fighting cells. Mild fluctuations may represent minor illness at the time of blood draw. Marked fluctuations can represent immune diseases. This can also be elevated from smoking.     High WBC: Elevation in wbc can commonly be caused by an infection, steroid use, or any cause of inflammation such as many rheumatologic diseases, surgery, or trauma.      Low WBC: Many different things can cause this such as infections, medications, rheumatologic disorders, malignancies, nutritional deficiencies, and a normal variant in some individuals. If this occurs it is common practice to rule out a few viruses such as HIV, Hep C, B12, folate along with a a peripheral blood smear to look for abnormalities. If you are otherwise healthy with no concerning symptoms and the workup is negative we usually monitor it with yearly blood work.  If there are other abnormal cell line abnormalities sometimes we refer to hematology to further evaluate.    Hemoglobin: A key indicator for anemia. Ranges depend on age. If you are significantly out of this range, we may need to talk with you.    High Hemoglobin: This can be elevated in some blood disorders, sleep apnea, chronic lung disease, kidney disease, testosterone use, dehydration, smoking, along with some other rare causes.     Low Hemoglobin: Many things can lead to this but the most common cause is an iron deficiency in females who lose blood during their periods, decreased absorption due to antacids, poor diet, sickle cell, anemia of chronic disease, malignancy, bleeding from the gut, along with some other less common causes. If you are a young female who has periods it is usually assumed that this is from  that blood loss unless other symptoms or abnormal blood work is present. If you are above 45 and have an iron deficiency it is always recommended to get a colonoscopy to ensure that there is no lesion causing blood loss and to exclude malignancy.     Hematocrit: Another way of looking at anemia, much like your hemoglobin. If you are significantly out of this range, we may need to talk with you.    MCV: This looks at the size of your red blood cells.     High MCV:  A large number may indicate a B-12 deficiency, folate deficiency, alcohol use, liver disease, medication-induced phenomenon, or a need for further workup.    Low MCV: A small number may imply iron anemia or genetic disorder such as alpha-thalassemia. We may check iron studies called to see if you are low.    MCH: Much like MCV above.    Platelets: These are clotting factors. We tolerate a broad range in this. If your numbers are less than 100, we may need to work it up.     High Platelet Count: If your numbers are elevated, this could represent ongoing inflammation within the body which can be caused by any infection, illness, iron deficiency, or other malignancy. We usually recheck it to monitor for improvement and if it does not resolve will work it up with more blood work.     Low Platelet Count: Many things can cause this such as infections, alcohol use, medications, liver disease, vitamin deficiencies, aspleenia, and some other rare blood disorders. If it persists many times we refer to hematology if a cause is not identified.     Iron:  This is not a reliable blood marker since this fluctuates throughout the day and if you are fasting many times your iron level in your blood is low but this does not necessarily mean you have a deficiency.  There are more reliable ways to measure your iron stores and these are discussed below.    Transferrin:  Many things can cause an abnormal result and this is not as important as some other labs mentioned  below.    TIBC:  This is the total iron-binding capacity and this measures the total amount of iron that can be bound by proteins in the blood.  If you have an elevated TIBC this is a good marker that you could be low on iron and this is useful blood marker.  A simple way to think of this is seats in a car. The TIBC is the number of open seats that iron can sit in. If you have a high TIBC (number of open seats) that means you have a low iron level.     Ferritin:  A low ferritin is almost always caused from an iron deficiency.  A normal ferritin level does not need necessarily exclude an iron deficiency since many inflammatory conditions such as kidney disease, diabetes, arthritis, and obesity can raise this level hiding an iron deficiency.  If you are healthy with no inflammatory conditions this is a very reliable test for detecting an iron deficiency since nothing else lowers your ferritin level except a low iron level. Keep in mind that you can have an iron deficiency if your ferritin level is normal but your TIBC is elevated.  If you have a low iron level the next step is always to identify why you have a low iron level.    CMP (Comprehensive Metabolic Panel):  Broadly, this is a test of organ function including the kidneys, liver, and electrolyte levels.    Glucose: This is primarily looking for diabetes. We like your blood glucose level to be less than 100 when fasting. Readings of 100-125 indicate what we call 'pre-diabetes' or 'glucose intolerance.' This does not necessarily indicate diabetes, but we may check another test called a hemoglobin A1C for confirmation. This level puts you at great risk for becoming a true diabetic and we would encourage the reduction of simple sugars and processed white flour as well as appropriate weight loss. If this number is greater than 125, it is likely you are diabetic; we will get an additional hemoglobin A1C test and will likely schedule you for an appointment. If you  notice that your blood glucose is above 125 and we have not scheduled a follow-up appointment, please call us. Patients who are known diabetics can have readings greater than 125.    Urea Nitrogen: This is a kidney function and maybe elevated because of mild dehydration or because of excessive muscle breakdown from aggressive exercise habits.    Creatinine: This also is a kidney function test. It may be mildly elevated if you have particularly large muscle bulk or taking a supplement like creatine. It is related to the GFR. It is a muscle product that we track to look at your kidney function. If this is elevated and new, we may need to talk with you. If you have had this mildly elevated in the past, it is likely that we will just track it to ensure that it does not worsen quickly. Some medications may gently worsen this, namely blood pressure pills called ace inhibitors. To some degree, we will permit levels of up to 1.6.    Sodium: This is essentially the concentration of salt within the body. This may be mildly low because of dehydration, overhydration, diuretics, .    Potassium: This is an electrolyte that can cause muscular cramping or cardiac difficulties. It is sometimes lowered by diuretic medications.    Chloride: For the most part, this is only relevant if the other electrolytes are abnormal.    CO2: This is a function of acid balance within the body. For the most part, mild abnormalities are not important and may represent a starvation or dehydration state when blood was drawn. Many medications can change this level as well such as diuretics, acetazolamide, calcium carbonate, laxatives, aspirin, and many others.    High CO2: Many things can cause this which includes dehydration, sleep apnea, and COPD.     Low CO2: Many things can lead to a low level and if you are generally healthy we are usually  not concerned. Diarrhea, renal disease, diabetes, and many medications can cause this.     Anion Gap: Only  relevant if your CO2 is abnormal.    Calcium: This is not related to dietary intake of calcium. It may fluctuate gently based on the amount of protein within your body. If it is above 10.9, we may need to do additional testing. Many times if low the albumin level is low and once the corrected calcium level is calculated the calcium is in a normal range.     Total protein: This looks at protein within the body. Markedly elevated levels can represent an immune response and may require further workup.    Albumin: This may be elevated because of particularly high level of fitness. If it is markedly suppressed, it may represent organ dysfunction, particularly in the liver or kidneys.    AST and ALT: These are enzymes in the liver and if elevated can indicate liver damage.     Elevated AST/ALT: Many things can caused elevated liver enzymes and the most common reason is viral infections, alcohol use, medications, supplements, autoimmune, along with some other rare causes. One of the most common reasons for a mild elevation in liver enzymes (less than 3 times the upper limit) is fatty liver disease. Many individuals who have extra fat in their diet store this in the liver and this can build up and cause a mild elevation. This is usually diagnosed with a liver ultrasound and exclusion of other causes. The only treatment for this is diet and exercise along with avoidance of liver toxic medications and alcohol. It is standard of care to rule our viral hepatitis and get imaging of the liver if elevated. This is monitored and if I feel concerned will refer to hepatology.     Alk Phos: Part of liver function or bones    High Alk Phos: elevated levels may indicate a liver injury or obstruction of bile flow. Elevated levels can also be seen in vitamin D deficiency, drug induced, or bone disorders.     Low Alk Phos: In most cases having a low Alkaline Phosphatase enzyme activity is not due to any disease and is simply a normal  variant.  Having an elevated Alk Phos is more concerning and associated with many diseases and thus I would not be overly concerned with a low level which is seen in many health individuals. The reasons for a low serum alkaline phosphatase activity were reviewed in a 1-yr retrospective study and in this study it was found that no cause was found in most cases.  Low activity values were recorded in several individuals in the absence of any obvious cause. This would suggest that the definition of the lower limit of the reference range for alkaline phosphatase is arbitrary, thus limiting the use of low serum activity as a marker of disease.  In some cases micronutrients like Zinc (Zn) and Magnesium (Mg) are causes of low ALP activity and you can take zinc or magnesium supplements. Unfortunately, the blood work to measure zinc and magnesium levels are unreliable and not very accurate since it does not test for the intracellular zinc or magnesium levels.     Kendrick FOSTERD, Eren LLANOS, James HERMOSILLO. Clinical significance of a low serum alkaline phosphatase. Net J Med. 1992 Feb;40(1-2):9-14. PMID: [id number].  Alfonso C. S, Matthew B, Dian I, Behera S, Alfonso S. Low Alkaline Phosphatase (ALP) In Adult Population an Indicator of Zinc (Zn) and Magnesium (Mg) Deficiency. Curr Res Nutr Food Sci 2017;5(3). doi : http://dx.[email or web address]    Total Bilirubin: This is also part of liver function.    High T Bili: If you have right upper quadrant pain an elevation in total bilirubin can be caused by a gallbladder stone that is blocking the biliary tract that leads to your gastrointestinal tract. If you have fever and right upper quadrant pain this can sometimes be elevated when your gallbladder is infected and most individuals have nausea with vomiting associated with it. If you have no symptoms and are otherwise healthy this can be caused by Gilbert syndrome which is a benign normal variant. There are other causes such as  "some anemias but there would be abnormal blood counts.     Low T Bili: Nothing to be concerned about.     Thyroid Stimulating Hormone (TSH): This reading is an indicator of your thyroid function. The thyroid regulates energy levels throughout the entire body, affecting almost every organ system. This is an inverse relationship so a high number actually presents a low thyroid. If this is abnormal, we will often check an additional lab called a Free T4 to evaluate this more carefully. Borderline elevations, those of 5-10, can be watched or worked up further. Please do not take the supplement Biotin for at least a week prior to getting your TSH checked since this can lead to false measurement levels.     Prostate Specific Antigen (PSA): (Men only.) This is a prostate cancer screening test, and is no longer a routine screening test. Levels are truly a function of age. Being less than 4 is typical for someone more in their 60s. If you are young, it should probably be less than 3. A higher PSA result does not necessarily mean that you have cancer, but may indicate a need for a discussion with your provider. Options include observation to look at rate of rise or prostate biopsy. Not only is the absolute value important, but how much it has changed from previous years. Please ensure that there is not a dramatic rise from previous years.    Please Note: This information is included as a reference to help you better understand your lab results and is not be used for diagnosis.    Frequently asked questions    When should I take my blood pressure medication?    The latest studies show that taking your blood pressure medication at night is the best time. A recent study showed that this prevents more heart attacks and strokes. See the answer below from [place].     "Q. I've taken blood pressure medicines every morning for many years, and they keep my pressure under control. Recently, my doctor recommended taking them at bedtime, " "instead. Does that make sense?    A. It actually does make sense -- based on recent research. For many years, there have been at least three theoretical reasons for taking blood pressure medicines before bedtime. First, a body system that strongly affects blood pressure, called the renin-angiotensin system, has its peak activity during sleep. Second, circadian rhythms cause differences in the body chemistry at night compared with daytime. Third, most heart attacks occur in the morning, before medicines taken in the morning have a chance to "kick in."" [6].     When should I take my cholesterol medication?    It used to be recommended to take your cholesterol medication at night since the original statins that lowered cholesterol did not last 24 hours and most cholesterol synthesis is done at night. The long acting statins such as atorvastatin and rosuvastatin last 24 hours so they can be taken any time during the day. Simvastatin, pravastatin, fluvastatin, and lovastatin are shorter acting and should be taken at bed time.     Can supplements affect my blood work?    Yes they can. A very important supplement to not take for at least a week prior to your blood work is biotin. "Biotin supplement use is common and can lead to the false measurement of thyroid hormone in commonly used assays." [8.]    What are conditions that should not be addressed during a virtual visit?    There are some conditions that should not be evaluated via a virtual visit since optimal care is impossible. Chest pain, shortness of breath, lung conditions, abdominal pain, and any neurological complaint such as headache, dizziness, numbness ect.         When will I get commentary of my blood work?    I review all blood work that you get and I will send out commentary on this via [employer] within 72 hours. In most cases you will get a message from me sooner, but many times not all of the blood work is completed thus I usually wait until all results " have returned. If there is a critical abnormality you should be contacted the same day you got blood work.     How frequently do I need to have visits to get controlled substances?    It is standard protocol to have a visit every 3 months if you are taking scheduled substances such as ADHD medications, psychiatric medications, and pain medications. This is to ensure your safety and monitor for any side effects.     When should I bring prior to a visit if I want to lose weight?    I recommend that you make a food diary for a week and fill out what you ate each day. You can bring this form in to your visit and I can look over it to suggest changes that you can make.     Which over the counter medications should I avoid if I have decreased kidney function?    NSAIDs which includes ibuprofen (Advil, Motrin, Nuprin), naproxen (Aleve), meloxicam (Mobic) and diclofenac(Zorvolex, Voltaren) and ketorolac (Toradol) can damage your kidneys if you take this long term.Tylenol does not affect your kidney and thus is safe as long as you don't have liver disease.     Is there an [facility]?     Yes! The [facility] is located on the first floor of the [facility]. The address is listed below. You can get curbside pickup if you call their number at [phone]. One of the many benefits of using the [facility] pharmacy is that the pharmacists can contact me directly if a cheaper alternative is available to save you money. They also see your note to know more about what the medication was prescribed for. I recommend this pharmacy since communication with me is quick in case any confusion arises on your medications.     [facility]  [address] [place] [id number]  Phone: [phone]    Hours:  Monday - Friday  8:00 a.m. - 5:30 a.m.    Why is it not in my best interest to call in order to get an antibiotic?    Medicine is a complex field and many times the correct diagnosis is critical in order to provide the correct care.  One of the most important goals of a healthcare provider is to ensure that no dangerous condition is masquerading as a mild illness. Specific questions are very important to obtain during an examination that provide a wealth of information to understand your illness. Health care providers are trained to investigate for signs that can be dangerous to your health. Messaging or calling the office in order to get an antibiotic can be very dangerous.     For example, many urinary tract infections can lead to an infection in the kidney that can result in a serious blood stream infection that can lead to hospitalization if not recognized. A cough can be caused by many different things and not necessarily an infection. It is not uncommon that one assumes a cough is from an infection when it is actually caused by a blood clot in the lungs. This can lead to death. Determining your risk can only be performed after a thorough history and examination. A few sentences through e-mail is not enough.     What are some common symptoms that should be evaluated by the emergency department and not by phone or e-mail?    This does not include every symptom, but common examples of symptoms that should prompt one to go to the emergency department are chest pain, chest pressure, shortness of breath, difficulty breathing, abdominal pain, weakness or numbness or an extremity, sudden weakness or drooping on one side of the body, speaking difficulty, unusual or bad headache (particularly if it started suddenly), head injury, confusion, seizure, passing out, lightheaded, pain in arm or jaw, suddenly not able to speak, see, walk, or move, dizziness, neck stiffness, suicidal thoughts, testicular pain, cuts and wounds, severe pain, along with many others. This is not an inclusive list.     Outside Records    It is common to have an colonoscopy, mammogram, PAP smear, or eye exam done outside the [facility] system. Many times we do not get the records  automatically sent to us. Please call your provider's office to notify them to fax us your records so that we can have the most up to date information. Your provider will review your outside results in order to provide you with the complete care that you deserve. We appreciate that you decided to choose us to be serving on your healthcare team and the more information we have about your health is essential.     If I have a psychiatric crisis what should I do?    If you ever feel that there is a risk of a harm to yourself we recommend to go to the emergency room. There is a National Suicide Prevention lifeline number 6-238-973-TALK which route you to the nearest crisis center. There is also a suicide hotline 6-644-DZXEZVV (1-318.559.9013).    988 has been designated as the new three-digit dialing code that will route callers to the National Suicide Prevention Lifeline (now known as the 988 Suicide & Crisis Lifeline), and is now active across the [place].    When people call, text, or chat 988, they will be connected to trained counselors that are part of the existing Lifeline network. These trained counselors will listen, understand how their problems are affecting them, provide support, and connect them to resources if necessary.    The previous Lifeline phone number (1-957.190.5945) will always remain available to people in emotional distress or suicidal crisis.    The [facility] network of over 200 crisis centers has been in operation since 2005, and has been proven to be effective. Its the counselors at these local crisis centers who answer the contacts the Lifeline receives every day. Numerous studies have shown that callers feel less suicidal, less depressed, less overwhelmed and more hopeful after speaking with a Lifeline counselor.     E-Visits    E-Visits are currently available for three conditions: Urinary tract infections, Sinus symptoms, and rashes.     If you have one of these infections or rash  you can fill out a questionnaire that will be sent to your provider who can review and send an appropriate medication. No visit is needed.      What is an E-Visit?    An E-Visit is a way to get care for certain conditions without needing to schedule a virtual visit or to come into the clinic. We'll ask you some clinically based questions about yourself and your symptoms and your provider will evaluate, make a diagnosis and respond with a care plan with recommendations including testing and medications as indicated, just as they would in an in-person setting.  If it becomes clear that you need to be seen virtually or in-person after the E-Visit, we can arrange that.         Should I use an E-Visit?    E-Visits should be used only for non-urgent medical conditions. If you need urgent medical care, please contact your clinic by phone, schedule a same-day appointment online or find a nearby [facility]. For serious medical emergencies, please call 911 immediately.         What to expect during an E-Visit   Once you have agreed to an E-Visit, you will be prompted to complete the E-Visit clinical questionnaire in [employer], which can take 10-20 minutes to complete. You may also be asked to confirm your medications.     Since this is a medical evaluation, it is billable to your insurance. Because this is a billable visit, you may also be asked for your insurance details and to enter your credit card information, just as you would for any other visit or co-pay. Much of this is stored in your account, so it should be easy to access or update if needed. You may receive a bill for this service, including any applicable copay amount, after the service is rendered.     Please allow up to 24 business hours for a reply. At any point in the E-Visit process, if you have not received a response within 72 hours, please call your clinic.        To initiate the E-Visit process in [facility], click here.    "    E-Consults    E-Consults are available when you need to have a specialists opinion on one thing and your PCP agrees to send an E-Consult to answer your question within 48 hours. This not only saves you time but money as well since a visit is not always needed.          Patient Education       Checking Your Blood Pressure at Home   The Basics   Written by the doctors and editors at [facility]   How is blood pressure measured? -- Blood pressure is usually measured with a device that goes around your upper arm. This is often done in a doctor's office. But some people also check their blood pressure themselves, at home or at work.  Blood pressure is explained with 2 numbers. For instance, your blood pressure might be "140 over 90." The first (top) number is the pressure inside your arteries when your heart is concha. The second (bottom) number is the pressure inside your arteries when your heart is relaxed. The table shows how doctors and nurses define high and normal blood pressure (table 1).  If your blood pressure gets too high, it puts you at risk for heart attack, stroke, and kidney disease. High blood pressure does not usually cause symptoms. But it can be serious.  What is a home blood pressure meter? -- A home blood pressure meter (or "monitor") is a device you can use to check your blood pressure yourself. It has a cuff that goes around your upper arm (figure 1). Some devices have a cuff that goes around your wrist instead. But doctors aren't sure if these work as well. The meter also has a small screen, or dial, that shows your blood pressure numbers.  There are also special meters you can wear for a day or 2. These are different because they automatically check your blood pressure throughout the day and night, even while you are sleeping. If your doctor thinks you should use one of these devices, they will talk to you about how to wear it.  Why do I need to check my blood pressure at home? -- If your " doctor knows or suspects that you have high blood pressure, they might want you to check it at home. There are a few reasons for this. Your doctor might want to look at:  Whether your blood pressure measures the same at home as it did in the doctor's office  How well your blood pressure medicines are working  Changes in your blood pressure, for example, if it goes up and down  People who check their own blood pressure at home usually do better at keeping it low.  How do I choose a home blood pressure meter? -- When choosing a home blood pressure meter, you will probably want to think about:  Cost - Some devices cost more than others. You should also check to see if your insurance will help pay for your device.  Size - It's important to make sure the cuff fits your arm comfortably. Your doctor or nurse can help you with this.  How easy it is to use - You should make sure you understand how to use the device. You also need to be able to read the numbers on the screen.  You do not need a prescription to buy a home blood pressure meter. You can buy them at most pharmacies or over the internet. Your doctor or nurse can help you choose the right device for you.  How do I check my blood pressure at home? -- Once you have a home blood pressure meter, your doctor or nurse should check it to make sure it fits you and works correctly.  When it's time to check your blood pressure:  Go to the bathroom and empty your bladder first. Having a full bladder can temporarily increase your blood pressure, making the results inaccurate.  Sit in a chair with your feet flat on the ground.  Try to breathe normally and stay calm.  Attach the cuff to your arm. Place the cuff directly on your skin, not over your clothing. The cuff should be tight enough to not slip down, but not uncomfortably tight.  Sit and relax for about 3 to 5 minutes with the cuff on.  Follow the directions that came with your device to start measuring your blood  pressure. This might involve squeezing the bulb at the end of the tube to inflate the cuff (fill it with air). With some monitors, you just need to press a button to inflate the cuff. When the cuff fills with air, it feels like someone is squeezing your arm, but it should not hurt. Then you will slowly deflate the cuff (let the air out of it), or it will deflate by itself. The screen or dial will show your blood pressure numbers.  Stay seated and relax for 1 minute, then measure your blood pressure again.  How often should I check my blood pressure? -- It depends. Different people need to follow different schedules. Your doctor or nurse will tell you how often to check your blood pressure, and when. Some people need to check their blood pressure twice a day, in the morning and evening.  Your doctor or nurse will probably tell you to keep track of your blood pressure for at least a few days (table 2). Then they will look at the numbers. The reason for this is that it's normal for your blood pressure to change a bit from day to day. For example, the numbers might change depending on whether you recently had caffeine, just exercised, or feel stressed. Checking your blood pressure over several days - or longer - will give your doctor or nurse a better idea of what is average for you.  How should I keep track of my blood pressure? -- Some blood pressure meters will record your numbers for you, or send them to your computer or smartphone. If yours does not do this, you will need to write them down. Your doctor or nurse can help you figure out the best way to keep track of the numbers.  What if my blood pressure is high? -- Your doctor or nurse will tell you what to do if your blood pressure is high when you check it at home. If you get a number that is higher than normal, measure it again to see if it is still high. If it is very high (above a certain number, which your doctor or nurse will tell you to watch out for), you  "should call your doctor right away.  If your blood pressure is only a little high, your doctor or nurse might tell you to keep checking it for a few more days or weeks, and then call if it does not go back down. Then they can help you decide what to do next.  All topics are updated as new evidence becomes available and our peer review process is complete.  This topic retrieved from [employer] on: Sep 21, 2021.  Topic 489060 Version 4.0  Release: 29.4.2 - C29.263  © 2021 UpToDate, Inc. and/or its affiliates. All rights reserved.  table 1: Definition of normal and high blood pressure  Level  Top number  Bottom number    High 130 or above 80 or above   Elevated 120 to 129 79 or below   Normal 119 or below 79 or below   These definitions are from the American College of Cardiology/American Heart Association. Other expert groups might use slightly different definitions.  "Elevated blood pressure" is a term doctor or nurses use as a warning. It means you do not yet have high blood pressure, but your blood pressure is not as low as it should be for good health.  Graphic 92487 Version 6.0  figure 1: Using a home blood pressure meter     This is an example of a person using a home blood pressure meter.  Graphic 179080 Version 1.0    Consumer Information Use and Disclaimer   This information is not specific medical advice and does not replace information you receive from your health care provider. This is only a brief summary of general information. It does NOT include all information about conditions, illnesses, injuries, tests, procedures, treatments, therapies, discharge instructions or life-style choices that may apply to you. You must talk with your health care provider for complete information about your health and treatment options. This information should not be used to decide whether or not to accept your health care provider's advice, instructions or recommendations. Only your health care provider has the knowledge " and training to provide advice that is right for you. The use of this information is governed by the [employer] End User License Agreement, available at https://www.[employer]/en/solutions/[employer]force/about/grace.The use of [employer] content is governed by the [employer] Terms of Use. ©2021 UpToDate, Inc. All rights reserved.  Copyright   © 2021 UpToDate, Inc. and/or its affiliates. All rights reserved.      Daily Blood Pressure Log    Please print this form to assist you in keeping track of your blood pressure at home.      Name:  Date of Birth:       Average Blood Pressure:           Date: Time  (a.m.) Blood  Pressure: Pulse  Rate: Time  (p.m.) Blood  Pressure : Pulse  Rate: Comments:   Sample 8:37 127/83 84    Stressful morning                                                                                                                                                                                                     Wishing you good health,     Larry Thompson MD     References:  1-https://www.[employer]/speakers/eva_vertes  2-https://www.[employer].com.au/news/mvssu-atx-96-cancers-that-can-ls-ntahyc-ys-smoking/  3-https://www.cdc.gov/cancer/lung/basic_info/screening.htm  4-https://newsroom.heart.org/news/common-hypertension-medications-may-reduce-colorectal-cancer-risk  5-My RODRIGUEZ, Bill M, Sawada N, et al. High hemoglobin A1c levels within the non-diabetic range are associated with the risk of all cancers. Int J Cancer. 2016;138(7):3890-1912. doi:10.1002/ijc.57700  6-https://www.health.[place].edu/newsletter_article/the-10-commandments-of-cancer-prevention  7-https://www.health.[place].edu/diseases-and-conditions/should-i-take-blood-pressure-medications-at-night  8-Melissa CANALES et al 2018 Prevalence of biotin supplement usage in outpatients and plasma biotin concentrations in patients presenting to the emergency department. Clin Biochem. Epub 2018 Jul 20. PMID: [id number].

## 2024-03-12 ENCOUNTER — LAB VISIT (OUTPATIENT)
Dept: LAB | Facility: HOSPITAL | Age: 74
End: 2024-03-12
Attending: STUDENT IN AN ORGANIZED HEALTH CARE EDUCATION/TRAINING PROGRAM
Payer: MEDICARE

## 2024-03-12 DIAGNOSIS — E27.8 ADRENAL INCIDENTALOMA: ICD-10-CM

## 2024-03-12 DIAGNOSIS — R94.7 ABNORMAL DEXAMETHASONE SUPPRESSION TEST: ICD-10-CM

## 2024-03-12 PROCEDURE — 82530 CORTISOL FREE: CPT | Performed by: STUDENT IN AN ORGANIZED HEALTH CARE EDUCATION/TRAINING PROGRAM

## 2024-03-12 PROCEDURE — 82570 ASSAY OF URINE CREATININE: CPT | Performed by: STUDENT IN AN ORGANIZED HEALTH CARE EDUCATION/TRAINING PROGRAM

## 2024-03-13 LAB
CREAT 24H UR-MRATE: 41.7 MG/HR (ref 40–75)
CREAT UR-MCNC: 52 MG/DL (ref 23–375)
CREATININE, URINE (MG/SPEC): 1001 MG/SPEC
URINE COLLECTION DURATION: 24 HR
URINE VOLUME: 1925 ML

## 2024-03-14 ENCOUNTER — PATIENT MESSAGE (OUTPATIENT)
Dept: FAMILY MEDICINE | Facility: CLINIC | Age: 74
End: 2024-03-14

## 2024-03-14 ENCOUNTER — OFFICE VISIT (OUTPATIENT)
Dept: FAMILY MEDICINE | Facility: CLINIC | Age: 74
End: 2024-03-14
Payer: MEDICARE

## 2024-03-14 ENCOUNTER — TELEPHONE (OUTPATIENT)
Dept: FAMILY MEDICINE | Facility: CLINIC | Age: 74
End: 2024-03-14

## 2024-03-14 ENCOUNTER — HOSPITAL ENCOUNTER (OUTPATIENT)
Dept: RADIOLOGY | Facility: HOSPITAL | Age: 74
Discharge: HOME | End: 2024-03-14
Attending: STUDENT IN AN ORGANIZED HEALTH CARE EDUCATION/TRAINING PROGRAM
Payer: MEDICARE

## 2024-03-14 DIAGNOSIS — I77.819 AORTIC ECTASIA: ICD-10-CM

## 2024-03-14 DIAGNOSIS — E87.5 HYPERKALEMIA: Primary | ICD-10-CM

## 2024-03-14 PROCEDURE — 99443 PR PHYSICIAN TELEPHONE EVALUATION 21-30 MIN: CPT | Mod: 95,,, | Performed by: STUDENT IN AN ORGANIZED HEALTH CARE EDUCATION/TRAINING PROGRAM

## 2024-03-14 PROCEDURE — 76775 US EXAM ABDO BACK WALL LIM: CPT | Mod: 26,,, | Performed by: RADIOLOGY

## 2024-03-14 PROCEDURE — 76775 US EXAM ABDO BACK WALL LIM: CPT | Mod: TC

## 2024-03-14 NOTE — TELEPHONE ENCOUNTER
----- Message from Larry Thompson MD sent at 3/14/2024  3:35 PM CDT -----  Regarding: repeat bmp 1-2 weeks for potassium thx

## 2024-03-14 NOTE — PROGRESS NOTES
Established Patient - Audio Only Telehealth Visit     The patient location is: [place]   The chief complaint leading to consultation is: hyperkalemia and htn   Visit type: Virtual visit with audio only (telephone)  Total time spent with patient: 23 min        The reason for the audio only service rather than synchronous audio and video virtual visit was related to technical difficulties or patient preference/necessity.     Each patient to whom I provide medical services by telemedicine is:  (1) informed of the relationship between the physician and patient and the respective role of any other health care provider with respect to management of the patient; and (2) notified that they may decline to receive medical services by telemedicine and may withdraw from such care at any time. Patient verbally consented to receive this service via voice-only telephone call.       HPI:  Patient had blood work he has a high potassium on labs he reports that he has not eating any high potassium foods such as bananas potatoes is or no changes in his diet.  He is taking Lasix daily at 40 mg along with lisinopril.  Has pain or shortness a breath blood pressure has been stable as well    He also dropped off a urine urine testing for urinary cortisol he also had an ultrasound of his aorta aneurism     Assessment and plan:  Hyperkalemia-will will continue Lasix also recommend a low-potassium diet will also send in a potassium binder to his pharmacy will continue his ACE inhibitor because this protect his kidney and his blood pressure has been controlled as well will recheck a potassium level in a week  \  Aortic aneurysm-stable control blood pressure no symptoms repeat ultrasound in a year    Hypertension stable continue current meds monitor no headache or chest pain f/u blood work      Diagnoses and all orders for this visit:    Hyperkalemia  -     Basic Metabolic Panel; Future     Sent veltassa                      This service was  not originating from a related E/M service provided within the previous 7 days nor will  to an E/M service or procedure within the next 24 hours or my soonest available appointment.  Prevailing standard of care was able to be met in this audio-only visit.

## 2024-03-15 ENCOUNTER — PATIENT OUTREACH (OUTPATIENT)
Dept: ADMINISTRATIVE | Facility: HOSPITAL | Age: 74
End: 2024-03-15
Payer: MEDICARE

## 2024-03-15 NOTE — PROGRESS NOTES
Population Health Chart Review & Patient Outreach Details      Additional [facility] Health Notes:               Updates Requested / Reviewed:      Updated Care Coordination Note, , Care Team Updated, and Immunizations Reconciliation Completed or Queried: [facility] Topics Overdue:      [facility] Score: 3     Colon Cancer Screening  Eye Exam  Foot Exam    Shingles/Zoster Vaccine  RSV Vaccine                  Health Maintenance Topic(s) Outreach Outcomes & Actions Taken:    Colorectal Cancer Screening - Outreach Outcomes & Actions Taken  : External Records Uploaded, Care Team Updated, & History Updated if Applicable

## 2024-03-16 LAB
COLLECT DURATION TIME UR: 24 H
CORTIS 24H UR-MRATE: 7.3 MCG/24 H (ref 3.5–45)
SPECIMEN VOL ?TM UR: 1925 ML

## 2024-04-01 ENCOUNTER — LAB VISIT (OUTPATIENT)
Dept: LAB | Facility: HOSPITAL | Age: 74
End: 2024-04-01
Attending: STUDENT IN AN ORGANIZED HEALTH CARE EDUCATION/TRAINING PROGRAM
Payer: MEDICARE

## 2024-04-01 DIAGNOSIS — E87.5 HYPERKALEMIA: ICD-10-CM

## 2024-04-01 LAB
ANION GAP SERPL CALC-SCNC: 7 MMOL/L (ref 8–16)
BUN SERPL-MCNC: 23 MG/DL (ref 8–23)
CALCIUM SERPL-MCNC: 9.5 MG/DL (ref 8.7–10.5)
CHLORIDE SERPL-SCNC: 104 MMOL/L (ref 95–110)
CO2 SERPL-SCNC: 26 MMOL/L (ref 23–29)
CREAT SERPL-MCNC: 1.2 MG/DL (ref 0.5–1.4)
EST. GFR  (NO RACE VARIABLE): >60 ML/MIN/1.73 M^2
GLUCOSE SERPL-MCNC: 106 MG/DL (ref 70–110)
POTASSIUM SERPL-SCNC: 5 MMOL/L (ref 3.5–5.1)
SODIUM SERPL-SCNC: 137 MMOL/L (ref 136–145)

## 2024-04-01 PROCEDURE — 80048 BASIC METABOLIC PNL TOTAL CA: CPT | Performed by: STUDENT IN AN ORGANIZED HEALTH CARE EDUCATION/TRAINING PROGRAM

## 2024-04-01 PROCEDURE — 36415 COLL VENOUS BLD VENIPUNCTURE: CPT | Mod: PO | Performed by: STUDENT IN AN ORGANIZED HEALTH CARE EDUCATION/TRAINING PROGRAM

## 2024-04-02 ENCOUNTER — TELEPHONE (OUTPATIENT)
Dept: FAMILY MEDICINE | Facility: CLINIC | Age: 74
End: 2024-04-02
Payer: MEDICARE

## 2024-04-02 DIAGNOSIS — R79.9 ABNORMAL FINDING OF BLOOD CHEMISTRY, UNSPECIFIED: Primary | ICD-10-CM

## 2024-04-02 NOTE — TELEPHONE ENCOUNTER
Pt states that Veltassa is too expensive, and this would put him in a donut hole. He bought a 30 day supply ($100). He was taking 500 mg of potassium once he realized he stopped (caused . He would like to know if it is okay to stop Veltassa when his script runs out. Please advise.

## 2024-04-02 NOTE — TELEPHONE ENCOUNTER
----- Message from Kary Sinclair sent at 4/2/2024  7:12 AM CDT -----  Contact: self  Type:  Needs Medical Advice    Who Called: self  Symptoms (please be specific): pt needs to speak to dr or nurse about medication, patiromer calcium sorbitex (VELTASSA) 8.4 gram PwPk  he is taking.    Would the patient rather a call back or a response via [facility]? call  Best Call Back Number: [phone] (home)     Additional Information: please advise and thank you.

## 2024-04-05 ENCOUNTER — LAB VISIT (OUTPATIENT)
Dept: LAB | Facility: HOSPITAL | Age: 74
End: 2024-04-05
Attending: STUDENT IN AN ORGANIZED HEALTH CARE EDUCATION/TRAINING PROGRAM
Payer: MEDICARE

## 2024-04-05 DIAGNOSIS — R79.9 ABNORMAL FINDING OF BLOOD CHEMISTRY, UNSPECIFIED: ICD-10-CM

## 2024-04-05 LAB
ANION GAP SERPL CALC-SCNC: 9 MMOL/L (ref 8–16)
BUN SERPL-MCNC: 24 MG/DL (ref 8–23)
CALCIUM SERPL-MCNC: 9.1 MG/DL (ref 8.7–10.5)
CHLORIDE SERPL-SCNC: 98 MMOL/L (ref 95–110)
CO2 SERPL-SCNC: 24 MMOL/L (ref 23–29)
CREAT SERPL-MCNC: 1.9 MG/DL (ref 0.5–1.4)
EST. GFR  (NO RACE VARIABLE): 37 ML/MIN/1.73 M^2
GLUCOSE SERPL-MCNC: 89 MG/DL (ref 70–110)
POTASSIUM SERPL-SCNC: 4.9 MMOL/L (ref 3.5–5.1)
SODIUM SERPL-SCNC: 131 MMOL/L (ref 136–145)

## 2024-04-05 PROCEDURE — 80048 BASIC METABOLIC PNL TOTAL CA: CPT | Performed by: STUDENT IN AN ORGANIZED HEALTH CARE EDUCATION/TRAINING PROGRAM

## 2024-04-05 PROCEDURE — 36415 COLL VENOUS BLD VENIPUNCTURE: CPT | Mod: PO | Performed by: STUDENT IN AN ORGANIZED HEALTH CARE EDUCATION/TRAINING PROGRAM

## 2024-04-23 DIAGNOSIS — E78.5 HYPERLIPIDEMIA ASSOCIATED WITH TYPE 2 DIABETES MELLITUS: ICD-10-CM

## 2024-04-23 DIAGNOSIS — E11.69 HYPERLIPIDEMIA ASSOCIATED WITH TYPE 2 DIABETES MELLITUS: ICD-10-CM

## 2024-04-23 DIAGNOSIS — I15.2 HYPERTENSION ASSOCIATED WITH DIABETES: ICD-10-CM

## 2024-04-23 DIAGNOSIS — E11.59 HYPERTENSION ASSOCIATED WITH DIABETES: ICD-10-CM

## 2024-04-23 RX ORDER — ATORVASTATIN CALCIUM 20 MG/1
20 TABLET, FILM COATED ORAL DAILY
Qty: 90 TABLET | Refills: 3 | Status: SHIPPED | OUTPATIENT
Start: 2024-04-23

## 2024-04-23 RX ORDER — LISINOPRIL 20 MG/1
20 TABLET ORAL DAILY
Qty: 90 TABLET | Refills: 2 | Status: SHIPPED | OUTPATIENT
Start: 2024-04-23

## 2024-04-23 NOTE — TELEPHONE ENCOUNTER
No care due was identified.  [facility] Embedded Care Due Messages. Reference number: [id number].   4/23/2024 3:18:49 PM CDT

## 2024-04-25 DIAGNOSIS — R60.0 BILATERAL LEG EDEMA: ICD-10-CM

## 2024-04-25 RX ORDER — FUROSEMIDE 20 MG/1
40 TABLET ORAL DAILY PRN
Qty: 180 TABLET | Refills: 3 | Status: SHIPPED | OUTPATIENT
Start: 2024-04-25

## 2024-04-25 NOTE — TELEPHONE ENCOUNTER
No care due was identified.  [facility] Embedded Care Due Messages. Reference number: [id number].   4/25/2024 1:35:50 PM CDT   Statement Selected

## 2024-07-11 ENCOUNTER — OFFICE VISIT (OUTPATIENT)
Dept: OPTOMETRY | Facility: CLINIC | Age: 74
End: 2024-07-11
Payer: COMMERCIAL

## 2024-07-11 DIAGNOSIS — Z96.1 PSEUDOPHAKIA: ICD-10-CM

## 2024-07-11 DIAGNOSIS — Z01.00 EXAMINATION OF EYES AND VISION: Primary | ICD-10-CM

## 2024-07-11 DIAGNOSIS — E11.59 TYPE 2 DIABETES MELLITUS WITH OTHER CIRCULATORY COMPLICATIONS: ICD-10-CM

## 2024-07-11 DIAGNOSIS — E11.9 DIABETES MELLITUS TYPE 2 WITHOUT RETINOPATHY: ICD-10-CM

## 2024-07-11 DIAGNOSIS — H52.7 REFRACTIVE ERROR: ICD-10-CM

## 2024-07-11 PROCEDURE — 99999 PR PBB SHADOW E&M-EST. PATIENT-LVL III: CPT | Mod: PBBFAC,,, | Performed by: OPTOMETRIST

## 2024-07-11 PROCEDURE — 92004 COMPRE OPH EXAM NEW PT 1/>: CPT | Mod: S$GLB,,, | Performed by: OPTOMETRIST

## 2024-07-11 PROCEDURE — 92015 DETERMINE REFRACTIVE STATE: CPT | Mod: S$GLB,,, | Performed by: OPTOMETRIST

## 2024-07-11 NOTE — PROGRESS NOTES
HPI     Diabetic Eye Exam     Additional comments: LDE: 2019           Comments    75 YO male presents today for an annual diabetic eye exam. Patient states   that he is doing well, notes no problems or complaints.     S/P CE w/IOL OU    Hemoglobin A1C       Date                     Value               Ref Range             Status                03/14/2024               5.6                 4.5 - 6.2 %           Final                  08/08/2023               5.8 (H)             4.0 - 5.6 %           Final                  01/17/2023               5.9 (H)             4.0 - 5.6 %           Final                      Last edited by Maia Salinas on 7/11/2024  7:56 AM.            Assessment /Plan     For exam results, see Encounter Report.    Type 2 diabetes mellitus with other circulatory complications  -     Ambulatory referral/consult to Optometry      1. Examination of eyes and vision    2. Type 2 diabetes mellitus with other circulatory complications  3. Diabetes mellitus type 2 without retinopathy  Discussed possible ocular affects of uncontrolled blood sugar with patient.   Recommended continued strong blood sugar control and continued care with PCP.   Monitor yearly.     4. Pseudophakia  S/p cataract extraction OU  No PCO  Observe     5. Refractive error  Good uncorrected vision  Dispensed spec rx prn

## 2024-08-07 DIAGNOSIS — E11.9 TYPE 2 DIABETES MELLITUS WITHOUT COMPLICATION: ICD-10-CM

## 2024-09-09 ENCOUNTER — OFFICE VISIT (OUTPATIENT)
Dept: FAMILY MEDICINE | Facility: CLINIC | Age: 74
End: 2024-09-09
Payer: MEDICARE

## 2024-09-09 VITALS
HEART RATE: 68 BPM | HEIGHT: 71 IN | WEIGHT: 216.94 LBS | DIASTOLIC BLOOD PRESSURE: 62 MMHG | SYSTOLIC BLOOD PRESSURE: 110 MMHG | BODY MASS INDEX: 30.37 KG/M2 | TEMPERATURE: 98 F | OXYGEN SATURATION: 96 %

## 2024-09-09 DIAGNOSIS — E11.59 HYPERTENSION ASSOCIATED WITH DIABETES: ICD-10-CM

## 2024-09-09 DIAGNOSIS — I15.2 HYPERTENSION ASSOCIATED WITH DIABETES: ICD-10-CM

## 2024-09-09 DIAGNOSIS — L98.9 LESION OF SKIN OF FOOT: ICD-10-CM

## 2024-09-09 DIAGNOSIS — Z00.00 ENCOUNTER FOR MEDICARE ANNUAL WELLNESS EXAM: Primary | ICD-10-CM

## 2024-09-09 DIAGNOSIS — L98.9 SKIN LESION: ICD-10-CM

## 2024-09-09 DIAGNOSIS — E78.5 HYPERLIPIDEMIA ASSOCIATED WITH TYPE 2 DIABETES MELLITUS: ICD-10-CM

## 2024-09-09 DIAGNOSIS — R26.9 ABNORMALITY OF GAIT AND MOBILITY: ICD-10-CM

## 2024-09-09 DIAGNOSIS — E11.59 TYPE 2 DIABETES MELLITUS WITH OTHER CIRCULATORY COMPLICATIONS: ICD-10-CM

## 2024-09-09 DIAGNOSIS — E11.69 HYPERLIPIDEMIA ASSOCIATED WITH TYPE 2 DIABETES MELLITUS: ICD-10-CM

## 2024-09-09 DIAGNOSIS — Z00.00 ENCOUNTER FOR PREVENTIVE HEALTH EXAMINATION: ICD-10-CM

## 2024-09-09 PROCEDURE — 1159F MED LIST DOCD IN RCRD: CPT | Mod: CPTII,S$GLB,, | Performed by: NURSE PRACTITIONER

## 2024-09-09 PROCEDURE — 3074F SYST BP LT 130 MM HG: CPT | Mod: CPTII,S$GLB,, | Performed by: NURSE PRACTITIONER

## 2024-09-09 PROCEDURE — 4010F ACE/ARB THERAPY RXD/TAKEN: CPT | Mod: CPTII,S$GLB,, | Performed by: NURSE PRACTITIONER

## 2024-09-09 PROCEDURE — 1125F AMNT PAIN NOTED PAIN PRSNT: CPT | Mod: CPTII,S$GLB,, | Performed by: NURSE PRACTITIONER

## 2024-09-09 PROCEDURE — 1158F ADVNC CARE PLAN TLK DOCD: CPT | Mod: CPTII,S$GLB,, | Performed by: NURSE PRACTITIONER

## 2024-09-09 PROCEDURE — 1160F RVW MEDS BY RX/DR IN RCRD: CPT | Mod: CPTII,S$GLB,, | Performed by: NURSE PRACTITIONER

## 2024-09-09 PROCEDURE — G0439 PPPS, SUBSEQ VISIT: HCPCS | Mod: S$GLB,,, | Performed by: NURSE PRACTITIONER

## 2024-09-09 PROCEDURE — 1101F PT FALLS ASSESS-DOCD LE1/YR: CPT | Mod: CPTII,S$GLB,, | Performed by: NURSE PRACTITIONER

## 2024-09-09 PROCEDURE — 3044F HG A1C LEVEL LT 7.0%: CPT | Mod: CPTII,S$GLB,, | Performed by: NURSE PRACTITIONER

## 2024-09-09 PROCEDURE — 3078F DIAST BP <80 MM HG: CPT | Mod: CPTII,S$GLB,, | Performed by: NURSE PRACTITIONER

## 2024-09-09 PROCEDURE — 3288F FALL RISK ASSESSMENT DOCD: CPT | Mod: CPTII,S$GLB,, | Performed by: NURSE PRACTITIONER

## 2024-09-09 PROCEDURE — 99999 PR PBB SHADOW E&M-EST. PATIENT-LVL V: CPT | Mod: PBBFAC,,, | Performed by: NURSE PRACTITIONER

## 2024-09-09 PROCEDURE — 1170F FXNL STATUS ASSESSED: CPT | Mod: CPTII,S$GLB,, | Performed by: NURSE PRACTITIONER

## 2024-09-09 NOTE — PROGRESS NOTES
"  Umang Goldberg presented for a  Medicare AWV and comprehensive Health Risk Assessment today. The following components were reviewed and updated:    Medical history  Family History  Social history  Allergies and Current Medications  Health Risk Assessment  Health Maintenance  Care Team         ** See Completed Assessments for Annual Wellness Visit within the encounter summary.**         The following assessments were completed:  Living Situation  CAGE  Depression Screening  Timed Get Up and Go  Whisper Test  Cognitive Function Screening  Nutrition Screening  ADL Screening  PAQ Screening    Clock in media   Opioid documentation:      Patient does not have a current opioid prescription.        Vitals:    09/09/24 1438   BP: 110/62   Pulse: 68   Temp: 97.6 °F (36.4 °C)   TempSrc: Oral   SpO2: 96%   Weight: 98.4 kg (216 lb 14.9 oz)   Height: 5' 11" (1.803 m)     Body mass index is 30.26 kg/m².  Physical Exam  Vitals and nursing note reviewed.   Constitutional:       Appearance: He is well-developed.   HENT:      Head: Normocephalic and atraumatic.      Right Ear: Hearing normal.      Left Ear: Hearing normal.      Nose: Nose normal.   Eyes:      General: Lids are normal.      Conjunctiva/sclera: Conjunctivae normal.      Pupils: Pupils are equal, round, and reactive to light.   Cardiovascular:      Rate and Rhythm: Normal rate.      Pulses:           Dorsalis pedis pulses are 2+ on the right side and 2+ on the left side.        Posterior tibial pulses are 2+ on the right side and 2+ on the left side.   Pulmonary:      Effort: Pulmonary effort is normal.   Abdominal:      Palpations: Abdomen is soft.   Musculoskeletal:         General: Normal range of motion.      Cervical back: Normal range of motion and neck supple.      Right foot: Normal range of motion. No deformity.      Left foot: Normal range of motion. No deformity.        Feet:    Feet:      Right foot:      Protective Sensation: 10 sites tested.  10 sites sensed. "      Skin integrity: No ulcer, blister or skin breakdown.      Toenail Condition: Fungal disease present.     Left foot:      Protective Sensation: 10 sites tested.  10 sites sensed.      Skin integrity: No ulcer, blister or skin breakdown.      Toenail Condition: Fungal disease present.  Skin:     General: Skin is warm and dry.   Neurological:      Mental Status: He is alert and oriented to person, place, and time.               Diagnoses and health risks identified today and associated recommendations/orders:    1. Encounter for Medicare annual wellness exam  Discussed health maintenance guidelines appropriate for age.      - Ambulatory Referral/Consult to Enhanced Annual Wellness Visit (eAWV)    2. Hypertension associated with diabetes  Controlled, continue current medication regimen  Low salt diet  Increase physical activity  Followed by pcp      3. Hyperlipidemia associated with type 2 diabetes mellitus  Controlled, continue current medication regimen  Patient taking statin  Followed by pcp      4. Type 2 diabetes mellitus with other circulatory complications  Controlled, continue current medication regimen  Last HgA1c - 5.8  ADA diet  Increase physical activity   Followed by pcp      5. Lesion of skin of foot    - Ambulatory referral/consult to Podiatry; Future    6. Skin lesion    - Ambulatory referral/consult to Dermatology; Future    7. Abnormality of gait and mobility  Stable continue to monitor         Provided Umang with a 5-10 year written screening schedule and personal prevention plan. Recommendations were developed using the USPSTF age appropriate recommendations. Education, counseling, and referrals were provided as needed. After Visit Summary printed and given to patient which includes a list of additional screenings\tests needed.    Follow up for One year for Annual Wellness Visit.    Stefany Manzo NP      I offered to discuss advanced care planning, including how to pick a person who would  make decisions for you if you were unable to make them for yourself, called a health care power of , and what kind of decisions you might make such as use of life sustaining treatments such as ventilators and tube feeding when faced with a life limiting illness recorded on a living will that they will need to know. (How you want to be cared for as you near the end of your natural life)     X Patient is interested in learning more about how to make advanced directives.  I provided them paperwork and offered to discuss this with them.

## 2024-09-09 NOTE — PATIENT INSTRUCTIONS
Counseling and Referral of Other Preventative  (Italic type indicates deductible and co-insurance are waived)    Patient Name: Umang Goldberg  Today's Date: 9/9/2024    Health Maintenance       Date Due Completion Date    Shingles Vaccine (1 of 2) Never done ---    RSV Vaccine (Age 60+ and Pregnant patients) (1 - 1-dose 60+ series) Never done ---    Foot Exam 04/18/2023 4/18/2022 (Done)    Override on 4/18/2022: Done    Diabetes Urine Screening 07/25/2024 7/25/2023    Influenza Vaccine (1) 09/01/2024 12/13/2023    COVID-19 Vaccine (3 - 2023-24 season) 09/01/2024 2/23/2021    Hemoglobin A1c 09/14/2024 3/14/2024    Colorectal Cancer Screening 01/26/2025 1/26/2024    Lipid Panel 03/14/2025 3/14/2024    High Dose Statin 04/23/2025 4/23/2024    Eye Exam 07/11/2025 7/11/2024    TETANUS VACCINE 05/16/2026 5/16/2016        Orders Placed This Encounter   Procedures    Ambulatory referral/consult to Dermatology       The following information is provided to all patients.  This information is to help you find resources for any of the problems found today that may be affecting your health:                  Living healthy guide: www.[facility].[place].gov      Understanding Diabetes: www.diabetes.org      Eating healthy: www.cdc.gov/healthyweight      CDC home safety checklist: www.cdc.gov/steadi/patient.html      Agency on Aging: www.goea.[place].[facility]      Alcoholics anonymous (AA): www.aa.org      Physical Activity: www.liberty.nih.gov/wk0lfve      Tobacco use: www.quitwithusla.org

## 2024-09-10 ENCOUNTER — OFFICE VISIT (OUTPATIENT)
Dept: PODIATRY | Facility: CLINIC | Age: 74
End: 2024-09-10
Payer: MEDICARE

## 2024-09-10 VITALS — HEART RATE: 70 BPM | BODY MASS INDEX: 30.31 KG/M2 | RESPIRATION RATE: 18 BRPM | HEIGHT: 71 IN | WEIGHT: 216.5 LBS

## 2024-09-10 DIAGNOSIS — L98.9 SKIN LESION OF FOOT: Primary | ICD-10-CM

## 2024-09-10 DIAGNOSIS — L98.9 LESION OF SKIN OF FOOT: ICD-10-CM

## 2024-09-10 PROCEDURE — 3044F HG A1C LEVEL LT 7.0%: CPT | Mod: CPTII,S$GLB,,

## 2024-09-10 PROCEDURE — 4010F ACE/ARB THERAPY RXD/TAKEN: CPT | Mod: CPTII,S$GLB,,

## 2024-09-10 PROCEDURE — 99999 PR PBB SHADOW E&M-EST. PATIENT-LVL III: CPT | Mod: PBBFAC,,,

## 2024-09-10 PROCEDURE — 11420 EXC H-F-NK-SP B9+MARG 0.5/<: CPT | Mod: GZ,S$GLB,,

## 2024-09-10 PROCEDURE — 1100F PTFALLS ASSESS-DOCD GE2>/YR: CPT | Mod: CPTII,S$GLB,,

## 2024-09-10 PROCEDURE — 1160F RVW MEDS BY RX/DR IN RCRD: CPT | Mod: CPTII,S$GLB,,

## 2024-09-10 PROCEDURE — 99203 OFFICE O/P NEW LOW 30 MIN: CPT | Mod: 25,S$GLB,,

## 2024-09-10 PROCEDURE — 3008F BODY MASS INDEX DOCD: CPT | Mod: CPTII,S$GLB,,

## 2024-09-10 PROCEDURE — 1126F AMNT PAIN NOTED NONE PRSNT: CPT | Mod: CPTII,S$GLB,,

## 2024-09-10 PROCEDURE — 1159F MED LIST DOCD IN RCRD: CPT | Mod: CPTII,S$GLB,,

## 2024-09-10 PROCEDURE — 3288F FALL RISK ASSESSMENT DOCD: CPT | Mod: CPTII,S$GLB,,

## 2024-09-10 RX ORDER — MUPIROCIN 20 MG/G
OINTMENT TOPICAL DAILY
Qty: 15 G | Refills: 1 | Status: SHIPPED | OUTPATIENT
Start: 2024-09-10

## 2024-09-10 NOTE — PROGRESS NOTES
"  [address]  [place] [address]  Phone: [phone]   [phone]    Patient's PCP:Larry Thompson MD  Referring Provider: Stefany Manzo    Subjective:      Chief Complaint:: Plantar Warts (Possible plantar wart)    ENRIQUETA Goldberg is a 74 y.o. male who presents today with a complaint of black skin lesion to left great toe . The current episode started yesterday.  The symptoms include slightly raised black skin lesion. Probable cause of complaint unknown.  The symptoms are aggravated by none. The problem has stayed the same. Treatment to date have included referral to podiatry and dermatology.     Systemic Doctor: Larry Thompson MD  Date Last Seen: 09/09/2024  Blood Sugar: doesn't check   Hemoglobin A1c: 5.6    Vitals:    09/10/24 1057   Pulse: 70   Resp: 18   Weight: 98.2 kg (216 lb 7.9 oz)   Height: 5' 11" (1.803 m)   PainSc: 0-No pain      Shoe Size: 9.5    Past Surgical History:   Procedure Laterality Date    BACK SURGERY      EYE SURGERY      HERNIA REPAIR       Past Medical History:   Diagnosis Date    A-fib 1 year    Diabetes mellitus     Hypertension      Family History   Problem Relation Name Age of Onset    Heart disease Mother      Skin cancer Father      Hypertension Brother      Heart disease Brother      Heart disease Brother      Psoriasis Daughter      Glaucoma Neg Hx      Macular degeneration Neg Hx      Retinal detachment Neg Hx      Melanoma Neg Hx      Lupus Neg Hx      Eczema Neg Hx          Social History:   Marital Status:   Alcohol History:  reports that he does not currently use alcohol.  Tobacco History:  reports that he has been smoking vaping with nicotine and cigarettes. He has been exposed to tobacco smoke. He has never used smokeless tobacco.  Drug History:  reports current drug use. Drug: Hydrocodone.    Review of patient's allergies indicates:   Allergen Reactions    Contrast media Hives and Itching     Pt can have IV contrast with " premedication       Current Outpatient Medications   Medication Sig Dispense Refill    albuterol (ACCUNEB) 0.63 mg/3 mL Nebu Take 0.63 mg by nebulization every 6 (six) hours as needed. Rescue      apixaban (ELIQUIS) 5 mg Tab 5 mg.      atorvastatin (LIPITOR) 20 MG tablet Take 1 tablet (20 mg total) by mouth once daily. 90 tablet 3    blood-glucose meter kit True matrix, use as directed (Patient not taking: Reported on 9/9/2024) 1 each 0    furosemide (LASIX) 20 MG tablet Take 2 tablets (40 mg total) by mouth daily as needed (bilateral edema). 180 tablet 3    lancing device Misc 1 Device by Misc.(Non-Drug; Combo Route) route 2 (two) times daily with meals. (Patient not taking: Reported on 9/9/2024) 1 each 0    lisinopriL (PRINIVIL,ZESTRIL) 20 MG tablet Take 1 tablet (20 mg total) by mouth once daily. 90 tablet 2    metFORMIN (GLUCOPHAGE) 1000 MG tablet Take 1 tablet (1,000 mg total) by mouth 2 (two) times daily with meals. 180 tablet 3    metoprolol succinate (TOPROL-XL) 25 MG 24 hr tablet Take 1 tablet (25 mg total) by mouth once daily. 90 tablet 3    propafenone (RHTHYMOL) 150 MG Tab Take 1 tablet (150 mg total) by mouth 2 (two) times daily. 180 tablet 3    sildenafil (REVATIO) 20 mg Tab TAKE THREE TABLETS BY MOUTH ONCE DAILY AS NEEDED 30 tablet 3     No current facility-administered medications for this visit.       Review of Systems   Constitutional:  Negative for activity change, chills, diaphoresis and fever.   Gastrointestinal:  Negative for diarrhea, nausea and vomiting.   Musculoskeletal:  Negative for arthralgias, back pain, gait problem, joint swelling and myalgias.   Skin:  Positive for color change (Black lesion plantar left hallux). Negative for pallor, rash and wound.   Neurological:  Negative for dizziness, tremors, weakness and numbness.         Objective:        Physical Exam:   Foot Exam    General  General Appearance: appears stated age and healthy   Orientation: alert and oriented to person,  place, and time   Affect: appropriate   Gait: unimpaired       Right Foot/Ankle     Inspection and Palpation  Ecchymosis: none  Tenderness: none   Swelling: none   Arch: normal  Hammertoes: absent  Claw Toes: absent  Hallux valgus: no  Hallux limitus: no  Skin Exam: skin intact; no drainage, no ulcer and no erythema   Fungus Toenails: not present  Neurovascular  Dorsalis pedis: doppler  Posterior tibial: doppler  Capillary Refill: 2+  Varicose veins: not present  Saphenous nerve sensation: normal  Tibial nerve sensation: normal  Superficial peroneal nerve sensation: normal  Deep peroneal nerve sensation: normal  Sural nerve sensation: normal    Muscle Strength  Ankle dorsiflexion: 5  Ankle plantar flexion: 5  Ankle inversion: 5  Ankle eversion: 5  Great toe extension: 5  Great toe flexion: 5    Range of Motion    Normal right ankle ROM    Tests  Talar tilt: negative   PT Tinel's sign: negative    Quang's sign: negative  Paresthesia: negative  Valleix sign: negative  Comments  Strong bi/triphasic doppler signals at the PT/DP     Left Foot/Ankle      Inspection and Palpation  Ecchymosis: none  Tenderness: none   Swelling: none   Arch: normal  Hammertoes: absent  Claw toes: absent  Hallux valgus: no  Hallux limitus: no  Skin Exam: skin intact; no drainage, no ulcer and no erythema   Fungus Toenails: not present    Neurovascular  Dorsalis pedis: doppler  Posterior tibial: doppler  Capillary refill: 2+  Varicose veins: not present  Saphenous nerve sensation: normal  Tibial nerve sensation: normal  Superficial peroneal nerve sensation: normal  Deep peroneal nerve sensation: normal  Sural nerve sensation: normal    Muscle Strength  Ankle dorsiflexion: 5  Ankle plantar flexion: 5  Ankle inversion: 5  Ankle eversion: 5  Great toe extension: 5  Great toe flexion: 5    Range of Motion    Normal left ankle ROM    Tests  Talar tilt: negative   PT Tinel's sign: negative  Quang's sign: negative  Paresthesia: negative  Valleix  sign: negative  Comments  Dark lesion plantar hallux with mosaic like appearance, slightly raised, irregular borders   No pain with palpation, medial to lateral or dorsal to plantar squeeze  No erythema, No warmth, No edema, No Pain, No drainage, No fluctuance or crepitation, No Malodor    Strong bi/triphasic doppler signals at the PT/DP     Physical Exam  Cardiovascular:      Pulses:           Dorsalis pedis pulses are doppler on the right side and doppler on the left side.        Posterior tibial pulses are doppler on the right side and doppler on the left side.   Musculoskeletal:      Right foot: No bunion.      Left foot: No bunion.   Feet:      Right foot:      Skin integrity: No ulcer or erythema.      Toenail Condition: Fungal disease     Left foot:      Skin integrity: No ulcer or erythema.      Toenail Condition: Fungal disease              Right Ankle/Foot Exam     Range of Motion   The patient has normal right ankle ROM.    Comments:  Strong bi/triphasic doppler signals at the PT/DP     Left Ankle/Foot Exam     Range of Motion   The patient has normal left ankle ROM.     Comments:  Dark lesion plantar hallux with mosaic like appearance, slightly raised, irregular borders   No pain with palpation, medial to lateral or dorsal to plantar squeeze  No erythema, No warmth, No edema, No Pain, No drainage, No fluctuance or crepitation, No Malodor    Strong bi/triphasic doppler signals at the PT/DP       Muscle Strength   Right Lower Extremity   Ankle Dorsiflexion:  5   Plantar flexion:  5/5  Left Lower Extremity   Ankle Dorsiflexion:  5   Plantar flexion:  5/5     Vascular Exam     Right Pulses  Dorsalis Pedis:      Doppler  Posterior Tibial:      Doppler        Left Pulses  Dorsalis Pedis:      Doppler  Posterior Tibial:      Doppler              Assessment:       1. Skin lesion of foot    2. Lesion of skin of foot      Plan:   Skin lesion of foot    Lesion of skin of foot  -     Ambulatory referral/consult to  Podiatry      Counseling:     I provided patient education verbally regarding: Patient diagnosis, treatment options, as well as alternatives, risks, and benefits.     DFE preformed, Category risk 0, Mild PAD with no LOPS, re-check 1 year     Discussed mosaic type appearance may be associated with plantar wart although the dark appearance is typically not consistent with a wart.     Reviewed the most reliable diagnostic method being punch biopsy with wide margins and histopathological analysis. Patient in agreement.     Most recent HgA1c 5.6, not concerning for wound healing complications. Strong Bi/Triphasic doppler signals on b/l pedal pulses.     With written informed consent skin biopsy preformed, see details in procedure brief below.     Specimen sent to pathology for histopathological analysis     Post procedure plantar left great toe 4 mm punch biopsy wound dressed with Bactroban, xeroform, and 2 inch 4x4s and alberta.     Post procedure care reviewed with patient, and hand out provided from staff.     Bactroban sent to patient's pharmacy     This note was created using Dragon voice recognition software that occasionally misinterpreted phrases or words.     Follow up in about 2 weeks (around 9/24/2024).    Excision of Lesion    Date/Time: 9/10/2024 11:00 AM    Performed by: Zach Melgar DPM  Authorized by: Zach Melgar DPM    Consent Done?:  Yes (Written)  Timeout: prior to procedure the correct patient, procedure, and site was verified    Prep: patient was prepped and draped in usual sterile fashion    Local anesthesia used?: Yes    Local anesthetic:  Lidocaine 2% without epinephrine  Anesthetic total (ml):  2  Assistants?: No    : Suspicous skin lesion.  Body area:  Foot  Laterality:  Left  Position:  Supine   Patient was prepped and draped in the normal sterile fashion.  Local anesthetic:  Lidocaine 2% without epinephrine  Excision type:  Skin  Malignancy:  Benign  Excision size (cm):  0.4  Scalpel  size: 4 mm Skin biopsy punch.  Incision type: circular punch.  Specimens?: Yes     Specimens submitted to pathology.   Hemostasis was obtained.  Estimated blood loss (cc):  1  Wound closure: secondary healing.  Sterile dressings:  Gauze, Steri-strips and Mastisol  Post-op diagnosis:  Same as pre-op diagnosis.   Needle, instrument, and sponge counts were correct.   Patient tolerated the procedure well with no immediate complications.   Post-operative instructions were provided for the patient.          Zach Melgar DPM  Podiatry / Foot and Ankle Surgery   Secure chat preferred

## 2024-09-24 ENCOUNTER — TELEPHONE (OUTPATIENT)
Dept: FAMILY MEDICINE | Facility: CLINIC | Age: 74
End: 2024-09-24
Payer: MEDICARE

## 2024-09-24 ENCOUNTER — OFFICE VISIT (OUTPATIENT)
Dept: PODIATRY | Facility: CLINIC | Age: 74
End: 2024-09-24
Payer: MEDICARE

## 2024-09-24 VITALS — HEIGHT: 71 IN | BODY MASS INDEX: 30.37 KG/M2 | WEIGHT: 216.94 LBS

## 2024-09-24 DIAGNOSIS — E11.59 TYPE 2 DIABETES MELLITUS WITH OTHER CIRCULATORY COMPLICATIONS: ICD-10-CM

## 2024-09-24 DIAGNOSIS — E11.59 HYPERTENSION ASSOCIATED WITH DIABETES: ICD-10-CM

## 2024-09-24 DIAGNOSIS — L98.9 SKIN LESION OF FOOT: ICD-10-CM

## 2024-09-24 DIAGNOSIS — I15.2 HYPERTENSION ASSOCIATED WITH DIABETES: ICD-10-CM

## 2024-09-24 DIAGNOSIS — R79.9 ABNORMAL FINDING OF BLOOD CHEMISTRY, UNSPECIFIED: Primary | ICD-10-CM

## 2024-09-24 DIAGNOSIS — D23.9 ANGIOKERATOMA: Primary | ICD-10-CM

## 2024-09-24 PROCEDURE — 1101F PT FALLS ASSESS-DOCD LE1/YR: CPT | Mod: CPTII,S$GLB,,

## 2024-09-24 PROCEDURE — 1160F RVW MEDS BY RX/DR IN RCRD: CPT | Mod: CPTII,S$GLB,,

## 2024-09-24 PROCEDURE — 3288F FALL RISK ASSESSMENT DOCD: CPT | Mod: CPTII,S$GLB,,

## 2024-09-24 PROCEDURE — 3044F HG A1C LEVEL LT 7.0%: CPT | Mod: CPTII,S$GLB,,

## 2024-09-24 PROCEDURE — 99213 OFFICE O/P EST LOW 20 MIN: CPT | Mod: S$GLB,,,

## 2024-09-24 PROCEDURE — 1159F MED LIST DOCD IN RCRD: CPT | Mod: CPTII,S$GLB,,

## 2024-09-24 PROCEDURE — 4010F ACE/ARB THERAPY RXD/TAKEN: CPT | Mod: CPTII,S$GLB,,

## 2024-09-24 PROCEDURE — 1126F AMNT PAIN NOTED NONE PRSNT: CPT | Mod: CPTII,S$GLB,,

## 2024-09-24 PROCEDURE — 3008F BODY MASS INDEX DOCD: CPT | Mod: CPTII,S$GLB,,

## 2024-09-24 PROCEDURE — 99999 PR PBB SHADOW E&M-EST. PATIENT-LVL II: CPT | Mod: PBBFAC,,,

## 2024-09-24 NOTE — PROGRESS NOTES
"  [address] [facility] [address] [place] [address]  Phone: [phone]   [phone]    Patient's PCP:Larry Thompson MD  Referring Provider: No ref. provider found    Subjective:      Chief Complaint:: Follow-up (Left foot big toe)    HPI  Umang Goldberg is a 74 y.o. male returns to clinic with a complaint of black skin lesion to plantar left great toe s/p biopsy punch 09/10/24. Patient notes no issues with post op wound care protocol. No fever, chills, nausea, drainage, redness, or vomiting.    Systemic Doctor: Larry Thompson MD  Date Last Seen: 09/09/2024  Blood Sugar: doesn't check   Hemoglobin A1c: 5.6    Vitals:    09/24/24 1450   Weight: 98.4 kg (216 lb 14.9 oz)   Height: 5' 11" (1.803 m)   PainSc: 0-No pain      Shoe Size: 9.5    Past Surgical History:   Procedure Laterality Date    BACK SURGERY      EYE SURGERY      HERNIA REPAIR       Past Medical History:   Diagnosis Date    A-fib 1 year    Diabetes mellitus     Hypertension      Family History   Problem Relation Name Age of Onset    Heart disease Mother      Skin cancer Father      Hypertension Brother      Heart disease Brother      Heart disease Brother      Psoriasis Daughter      Glaucoma Neg Hx      Macular degeneration Neg Hx      Retinal detachment Neg Hx      Melanoma Neg Hx      Lupus Neg Hx      Eczema Neg Hx          Social History:   Marital Status:   Alcohol History:  reports that he does not currently use alcohol.  Tobacco History:  reports that he has been smoking vaping with nicotine and cigarettes. He has been exposed to tobacco smoke. He has never used smokeless tobacco.  Drug History:  reports current drug use. Drug: Hydrocodone.    Review of patient's allergies indicates:   Allergen Reactions    Contrast media Hives and Itching     Pt can have IV contrast with premedication       Current Outpatient Medications   Medication Sig Dispense Refill    albuterol (ACCUNEB) 0.63 mg/3 mL Nebu Take 0.63 mg by nebulization " every 6 (six) hours as needed. Rescue      apixaban (ELIQUIS) 5 mg Tab 5 mg.      atorvastatin (LIPITOR) 20 MG tablet Take 1 tablet (20 mg total) by mouth once daily. 90 tablet 3    furosemide (LASIX) 20 MG tablet Take 2 tablets (40 mg total) by mouth daily as needed (bilateral edema). 180 tablet 3    lisinopriL (PRINIVIL,ZESTRIL) 20 MG tablet Take 1 tablet (20 mg total) by mouth once daily. 90 tablet 2    metFORMIN (GLUCOPHAGE) 1000 MG tablet Take 1 tablet (1,000 mg total) by mouth 2 (two) times daily with meals. 180 tablet 3    metoprolol succinate (TOPROL-XL) 25 MG 24 hr tablet Take 1 tablet (25 mg total) by mouth once daily. 90 tablet 3    mupirocin (BACTROBAN) 2 % ointment Apply topically once daily. 15 g 1    propafenone (RHTHYMOL) 150 MG Tab Take 1 tablet (150 mg total) by mouth 2 (two) times daily. 180 tablet 3    sildenafil (REVATIO) 20 mg Tab TAKE THREE TABLETS BY MOUTH ONCE DAILY AS NEEDED 30 tablet 3    blood-glucose meter kit True matrix, use as directed (Patient not taking: Reported on 9/9/2024) 1 each 0    lancing device Misc 1 Device by Misc.(Non-Drug; Combo Route) route 2 (two) times daily with meals. (Patient not taking: Reported on 9/9/2024) 1 each 0     No current facility-administered medications for this visit.       Review of Systems   Constitutional:  Negative for activity change, appetite change, chills and fever.   Cardiovascular:  Negative for leg swelling (Controlled on lasix).   Gastrointestinal:  Negative for constipation, diarrhea, nausea and vomiting.   Musculoskeletal:  Negative for arthralgias, back pain, gait problem, joint swelling and myalgias.   Skin:  Positive for wound (plantar left great toe). Negative for color change, pallor and rash.   Neurological:  Negative for dizziness, tremors, weakness and numbness.         Objective:        Physical Exam:   Foot Exam    General  General Appearance: appears stated age and healthy   Orientation: alert and oriented to person, place,  and time   Affect: appropriate   Gait: unimpaired       Right Foot/Ankle     Inspection and Palpation  Ecchymosis: none  Tenderness: none   Swelling: none   Arch: normal  Hammertoes: absent  Claw Toes: absent  Hallux valgus: no  Hallux limitus: no  Skin Exam: skin intact; no drainage, no ulcer and no erythema   Fungus Toenails: not present  Neurovascular  Dorsalis pedis: doppler  Posterior tibial: doppler  Capillary Refill: 2+  Varicose veins: not present  Saphenous nerve sensation: normal  Tibial nerve sensation: normal  Superficial peroneal nerve sensation: normal  Deep peroneal nerve sensation: normal  Sural nerve sensation: normal    Muscle Strength  Ankle dorsiflexion: 5  Ankle plantar flexion: 5  Ankle inversion: 5  Ankle eversion: 5  Great toe extension: 5  Great toe flexion: 5    Range of Motion    Normal right ankle ROM    Tests  Talar tilt: negative   PT Tinel's sign: negative    Quang's sign: negative  Paresthesia: negative  Valleix sign: negative  Comments  Strong bi/triphasic doppler signals at the PT/DP     Left Foot/Ankle      Inspection and Palpation  Ecchymosis: none  Tenderness: none   Swelling: none   Arch: normal  Hammertoes: absent  Claw toes: absent  Hallux valgus: no  Hallux limitus: no  Skin Exam: skin intact; no drainage, no ulcer and no erythema   Fungus Toenails: not present    Neurovascular  Dorsalis pedis: doppler  Posterior tibial: doppler  Capillary refill: 2+  Varicose veins: not present  Saphenous nerve sensation: normal  Tibial nerve sensation: normal  Superficial peroneal nerve sensation: normal  Deep peroneal nerve sensation: normal  Sural nerve sensation: normal    Muscle Strength  Ankle dorsiflexion: 5  Ankle plantar flexion: 5  Ankle inversion: 5  Ankle eversion: 5  Great toe extension: 5  Great toe flexion: 5    Range of Motion    Normal left ankle ROM    Tests  Talar tilt: negative   PT Tinel's sign: negative  Quang's sign: negative  Paresthesia: negative  Valleix sign:  negative  Comments  0.3 x 0.3 x 0.1 partial thickness heeling wound   No pain with palpation, medial to lateral or dorsal to plantar squeeze  No erythema, No warmth, No edema, No Pain, No drainage, No fluctuance or crepitation, No Malodor    Strong bi/triphasic doppler signals at the PT/DP     Physical Exam  Cardiovascular:      Pulses:           Dorsalis pedis pulses are doppler on the right side and doppler on the left side.        Posterior tibial pulses are doppler on the right side and doppler on the left side.   Musculoskeletal:      Right foot: No bunion.      Left foot: No bunion.   Feet:      Right foot:      Skin integrity: No ulcer or erythema.      Toenail Condition: Fungal disease     Left foot:      Skin integrity: No ulcer or erythema.      Toenail Condition: Fungal disease            Right Ankle/Foot Exam     Range of Motion   The patient has normal right ankle ROM.    Comments:  Strong bi/triphasic doppler signals at the PT/DP     Left Ankle/Foot Exam     Range of Motion   The patient has normal left ankle ROM.     Comments:  0.3 x 0.3 x 0.1 partial thickness heeling wound   No pain with palpation, medial to lateral or dorsal to plantar squeeze  No erythema, No warmth, No edema, No Pain, No drainage, No fluctuance or crepitation, No Malodor    Strong bi/triphasic doppler signals at the PT/DP       Muscle Strength   Right Lower Extremity   Ankle Dorsiflexion:  5   Plantar flexion:  5/5  Left Lower Extremity   Ankle Dorsiflexion:  5   Plantar flexion:  5/5     Vascular Exam     Right Pulses  Dorsalis Pedis:      Doppler  Posterior Tibial:      Doppler        Left Pulses  Dorsalis Pedis:      Doppler  Posterior Tibial:      Doppler            Assessment:       1. Angiokeratoma    2. Skin lesion of foot        Plan:   Angiokeratoma    Skin lesion of foot        Counseling:     I provided patient education verbally regarding: Patient diagnosis, treatment options, as well as alternatives, risks, and  benefits.     Pathology report dicussed with patient, Angiokeratoma, typically harmless. Provided patient with print out report from media.     Biopsy site healing as expected with no signs of infection     Continue post procedure care reviewed with patient, Bactroban dispensed last visit     This note was created using Dragon voice recognition software that occasionally misinterpreted phrases or words.     Follow up in about 1 week (around 10/1/2024).          Zach Melgar DPM  Podiatry / Foot and Ankle Surgery   Secure chat preferred

## 2024-09-24 NOTE — TELEPHONE ENCOUNTER
----- Message from Rebeca Bass sent at 9/24/2024 12:48 PM CDT -----  Caller is requesting to schedule their Lab appointment prior.    Name of Caller:  Pt    Preferred Date and Time of Labs:    week before appt    Date of EPP Appointment:   10/16/2024    Where would they like the lab performed?    [place] office    Would the patient rather a call back or a response via [facility]?   Call back    Best Call Back Number:   [phone]    Additional Information:  Needing to get labs done before appt. Would like a 7 in morning because he knows they will be fasting. Portal is messed up.   Please call back to advise. Thanks!

## 2024-10-09 ENCOUNTER — LAB VISIT (OUTPATIENT)
Dept: LAB | Facility: HOSPITAL | Age: 74
End: 2024-10-09
Attending: STUDENT IN AN ORGANIZED HEALTH CARE EDUCATION/TRAINING PROGRAM
Payer: MEDICARE

## 2024-10-09 DIAGNOSIS — R79.9 ABNORMAL FINDING OF BLOOD CHEMISTRY, UNSPECIFIED: ICD-10-CM

## 2024-10-09 DIAGNOSIS — R19.00 ABDOMINAL MASS, UNSPECIFIED ABDOMINAL LOCATION: ICD-10-CM

## 2024-10-09 DIAGNOSIS — E11.59 HYPERTENSION ASSOCIATED WITH DIABETES: ICD-10-CM

## 2024-10-09 DIAGNOSIS — I15.2 HYPERTENSION ASSOCIATED WITH DIABETES: ICD-10-CM

## 2024-10-09 DIAGNOSIS — E11.59 TYPE 2 DIABETES MELLITUS WITH OTHER CIRCULATORY COMPLICATIONS: ICD-10-CM

## 2024-10-09 LAB
ALBUMIN SERPL BCP-MCNC: 3.8 G/DL (ref 3.5–5.2)
ALP SERPL-CCNC: 70 U/L (ref 55–135)
ALT SERPL W/O P-5'-P-CCNC: 7 U/L (ref 10–44)
ANION GAP SERPL CALC-SCNC: 6 MMOL/L (ref 8–16)
AST SERPL-CCNC: 12 U/L (ref 10–40)
BILIRUB SERPL-MCNC: 0.3 MG/DL (ref 0.1–1)
BUN SERPL-MCNC: 23 MG/DL (ref 8–23)
CALCIUM SERPL-MCNC: 9.3 MG/DL (ref 8.7–10.5)
CHLORIDE SERPL-SCNC: 98 MMOL/L (ref 95–110)
CHOLEST SERPL-MCNC: 135 MG/DL (ref 120–199)
CHOLEST/HDLC SERPL: 4.2 {RATIO} (ref 2–5)
CO2 SERPL-SCNC: 30 MMOL/L (ref 23–29)
CREAT SERPL-MCNC: 1.5 MG/DL (ref 0.5–1.4)
CREAT SERPL-MCNC: 1.5 MG/DL (ref 0.5–1.4)
EST. GFR  (NO RACE VARIABLE): 48.9 ML/MIN/1.73 M^2
EST. GFR  (NO RACE VARIABLE): 48.9 ML/MIN/1.73 M^2
ESTIMATED AVG GLUCOSE: 123 MG/DL (ref 68–131)
GLUCOSE SERPL-MCNC: 101 MG/DL (ref 70–110)
HBA1C MFR BLD: 5.9 % (ref 4–5.6)
HDLC SERPL-MCNC: 32 MG/DL (ref 40–75)
HDLC SERPL: 23.7 % (ref 20–50)
LDLC SERPL CALC-MCNC: 64.2 MG/DL (ref 63–159)
NONHDLC SERPL-MCNC: 103 MG/DL
POTASSIUM SERPL-SCNC: 5.3 MMOL/L (ref 3.5–5.1)
PROT SERPL-MCNC: 6.7 G/DL (ref 6–8.4)
SODIUM SERPL-SCNC: 134 MMOL/L (ref 136–145)
TRIGL SERPL-MCNC: 194 MG/DL (ref 30–150)

## 2024-10-09 PROCEDURE — 83036 HEMOGLOBIN GLYCOSYLATED A1C: CPT | Performed by: STUDENT IN AN ORGANIZED HEALTH CARE EDUCATION/TRAINING PROGRAM

## 2024-10-09 PROCEDURE — 80061 LIPID PANEL: CPT | Performed by: STUDENT IN AN ORGANIZED HEALTH CARE EDUCATION/TRAINING PROGRAM

## 2024-10-09 PROCEDURE — 80053 COMPREHEN METABOLIC PANEL: CPT | Performed by: STUDENT IN AN ORGANIZED HEALTH CARE EDUCATION/TRAINING PROGRAM

## 2024-10-16 ENCOUNTER — OFFICE VISIT (OUTPATIENT)
Dept: FAMILY MEDICINE | Facility: CLINIC | Age: 74
End: 2024-10-16
Payer: MEDICARE

## 2024-10-16 VITALS
BODY MASS INDEX: 29.94 KG/M2 | OXYGEN SATURATION: 94 % | SYSTOLIC BLOOD PRESSURE: 134 MMHG | HEIGHT: 71 IN | HEART RATE: 63 BPM | TEMPERATURE: 98 F | RESPIRATION RATE: 17 BRPM | WEIGHT: 213.88 LBS | DIASTOLIC BLOOD PRESSURE: 64 MMHG

## 2024-10-16 DIAGNOSIS — M54.9 BACK PAIN, UNSPECIFIED BACK LOCATION, UNSPECIFIED BACK PAIN LATERALITY, UNSPECIFIED CHRONICITY: ICD-10-CM

## 2024-10-16 DIAGNOSIS — N18.31 STAGE 3A CHRONIC KIDNEY DISEASE: ICD-10-CM

## 2024-10-16 DIAGNOSIS — F17.219 NICOTINE DEPENDENCE, CIGARETTES, WITH UNSPECIFIED NICOTINE-INDUCED DISORDERS: ICD-10-CM

## 2024-10-16 DIAGNOSIS — E11.59 HYPERTENSION ASSOCIATED WITH DIABETES: ICD-10-CM

## 2024-10-16 DIAGNOSIS — E11.69 HYPERLIPIDEMIA ASSOCIATED WITH TYPE 2 DIABETES MELLITUS: ICD-10-CM

## 2024-10-16 DIAGNOSIS — R79.9 ABNORMAL FINDING OF BLOOD CHEMISTRY, UNSPECIFIED: ICD-10-CM

## 2024-10-16 DIAGNOSIS — J44.9 CHRONIC OBSTRUCTIVE PULMONARY DISEASE, UNSPECIFIED COPD TYPE: ICD-10-CM

## 2024-10-16 DIAGNOSIS — Z00.00 HEALTHCARE MAINTENANCE: Primary | ICD-10-CM

## 2024-10-16 DIAGNOSIS — E78.5 HYPERLIPIDEMIA ASSOCIATED WITH TYPE 2 DIABETES MELLITUS: ICD-10-CM

## 2024-10-16 DIAGNOSIS — Z23 NEED FOR INFLUENZA VACCINATION: ICD-10-CM

## 2024-10-16 DIAGNOSIS — I48.0 PAROXYSMAL ATRIAL FIBRILLATION: ICD-10-CM

## 2024-10-16 DIAGNOSIS — E11.59 TYPE 2 DIABETES MELLITUS WITH OTHER CIRCULATORY COMPLICATIONS: ICD-10-CM

## 2024-10-16 DIAGNOSIS — I15.2 HYPERTENSION ASSOCIATED WITH DIABETES: ICD-10-CM

## 2024-10-16 PROCEDURE — 4010F ACE/ARB THERAPY RXD/TAKEN: CPT | Mod: CPTII,S$GLB,, | Performed by: STUDENT IN AN ORGANIZED HEALTH CARE EDUCATION/TRAINING PROGRAM

## 2024-10-16 PROCEDURE — 90653 IIV ADJUVANT VACCINE IM: CPT | Mod: S$GLB,,, | Performed by: STUDENT IN AN ORGANIZED HEALTH CARE EDUCATION/TRAINING PROGRAM

## 2024-10-16 PROCEDURE — 3044F HG A1C LEVEL LT 7.0%: CPT | Mod: CPTII,S$GLB,, | Performed by: STUDENT IN AN ORGANIZED HEALTH CARE EDUCATION/TRAINING PROGRAM

## 2024-10-16 PROCEDURE — 3008F BODY MASS INDEX DOCD: CPT | Mod: CPTII,S$GLB,, | Performed by: STUDENT IN AN ORGANIZED HEALTH CARE EDUCATION/TRAINING PROGRAM

## 2024-10-16 PROCEDURE — 3078F DIAST BP <80 MM HG: CPT | Mod: CPTII,S$GLB,, | Performed by: STUDENT IN AN ORGANIZED HEALTH CARE EDUCATION/TRAINING PROGRAM

## 2024-10-16 PROCEDURE — 3288F FALL RISK ASSESSMENT DOCD: CPT | Mod: CPTII,S$GLB,, | Performed by: STUDENT IN AN ORGANIZED HEALTH CARE EDUCATION/TRAINING PROGRAM

## 2024-10-16 PROCEDURE — 1159F MED LIST DOCD IN RCRD: CPT | Mod: CPTII,S$GLB,, | Performed by: STUDENT IN AN ORGANIZED HEALTH CARE EDUCATION/TRAINING PROGRAM

## 2024-10-16 PROCEDURE — 3066F NEPHROPATHY DOC TX: CPT | Mod: CPTII,S$GLB,, | Performed by: STUDENT IN AN ORGANIZED HEALTH CARE EDUCATION/TRAINING PROGRAM

## 2024-10-16 PROCEDURE — 1126F AMNT PAIN NOTED NONE PRSNT: CPT | Mod: CPTII,S$GLB,, | Performed by: STUDENT IN AN ORGANIZED HEALTH CARE EDUCATION/TRAINING PROGRAM

## 2024-10-16 PROCEDURE — 99999 PR PBB SHADOW E&M-EST. PATIENT-LVL IV: CPT | Mod: PBBFAC,,, | Performed by: STUDENT IN AN ORGANIZED HEALTH CARE EDUCATION/TRAINING PROGRAM

## 2024-10-16 PROCEDURE — 3075F SYST BP GE 130 - 139MM HG: CPT | Mod: CPTII,S$GLB,, | Performed by: STUDENT IN AN ORGANIZED HEALTH CARE EDUCATION/TRAINING PROGRAM

## 2024-10-16 PROCEDURE — 99407 BEHAV CHNG SMOKING > 10 MIN: CPT | Mod: S$GLB,,, | Performed by: STUDENT IN AN ORGANIZED HEALTH CARE EDUCATION/TRAINING PROGRAM

## 2024-10-16 PROCEDURE — 1101F PT FALLS ASSESS-DOCD LE1/YR: CPT | Mod: CPTII,S$GLB,, | Performed by: STUDENT IN AN ORGANIZED HEALTH CARE EDUCATION/TRAINING PROGRAM

## 2024-10-16 PROCEDURE — 99397 PER PM REEVAL EST PAT 65+ YR: CPT | Mod: S$GLB,,, | Performed by: STUDENT IN AN ORGANIZED HEALTH CARE EDUCATION/TRAINING PROGRAM

## 2024-10-16 PROCEDURE — 3060F POS MICROALBUMINURIA REV: CPT | Mod: CPTII,S$GLB,, | Performed by: STUDENT IN AN ORGANIZED HEALTH CARE EDUCATION/TRAINING PROGRAM

## 2024-10-16 PROCEDURE — G0008 ADMIN INFLUENZA VIRUS VAC: HCPCS | Mod: S$GLB,,, | Performed by: STUDENT IN AN ORGANIZED HEALTH CARE EDUCATION/TRAINING PROGRAM

## 2024-10-16 NOTE — PROGRESS NOTES
Identified pts name and , high dose flu vaccine administered IM, pt tolerated well. Aseptic technique maintained. Pain scale 0 out of 10 on pain scale. Pt instructed to wait in clinic for 15 minutes after injection was given.

## 2024-10-16 NOTE — PROGRESS NOTES
[facility] Note    Subjective:    The HPI and pertinent ROS is included in the Diagnostic Impression Remarks section at the end of the note. Please see below for further details. Chief complaint is at end of note.     Umang is a pleasant intelligent patient who is here for evaluation.     Modified Medications    No medications on file       Data reviewed 274}  Previous medical records reviewed and summarized in plan section at end of note.      If you are due for any health screening(s) below please notify me so we can arrange them to be ordered and scheduled. Most healthy patients at your age complete them, but you are free to accept or refuse. If you can't do it, I'll definitely understand. If you can, I'd certainly appreciate it!     Tests to Keep You Healthy    Eye Exam: Met on 7/11/2024  Colon Cancer Screening: Met on 1/26/2024  Last Blood Pressure <= 139/89 (10/16/2024): Yes  Last HbA1c < 8 (10/09/2024): Yes  Tobacco Cessation: NO      The following portions of the patient's history were reviewed and updated as appropriate: allergies, current medications, past family history, past medical history, past social history, past surgical history and problem list.    He  has a past medical history of A-fib (1 year), Diabetes mellitus, and Hypertension.  He  has a past surgical history that includes Back surgery; Hernia repair; and Eye surgery.    He  reports that he has been smoking vaping with nicotine and cigarettes. He has been exposed to tobacco smoke. He has never used smokeless tobacco. He reports that he does not currently use alcohol. He reports current drug use. Drug: Hydrocodone.  He family history includes Heart disease in his brother, brother, and mother; Hypertension in his brother; Psoriasis in his daughter; Skin cancer in his father.    Review of patient's allergies indicates:   Allergen Reactions    Contrast media Hives and Itching     Pt can have IV contrast with premedication  "      Tobacco Use: High Risk (10/16/2024)    Patient History     Smoking Tobacco Use: Every Day     Smokeless Tobacco Use: Never     Passive Exposure: Current     Physical Examination  Physical Exam  Vital Signs  Blood pressure reading was 134/64.     General appearance: alert, cooperative, no distress  Neck: no thyromegaly, no neck stiffness  Lungs: clear to auscultation, no wheezes, rales or rhonchi, symmetric air entry  Heart: normal rate, regular rhythm, normal S1, S2, no murmurs, rubs, clicks or gallops  Abdomen: soft, nontender, nondistended, no rigidity, rebound, or guarding.   Back: no point tenderness over spine  Extremities: peripheral pulses normal, no unilateral leg swelling or calf tenderness   Neurological:alert, oriented, normal speech, no new focal findings or movement disorder noted from baseline      BP Readings from Last 3 Encounters:   10/16/24 134/64   09/09/24 110/62   03/07/24 (!) 122/58     Wt Readings from Last 3 Encounters:   10/16/24 97 kg (213 lb 13.5 oz)   09/24/24 98.4 kg (216 lb 14.9 oz)   09/10/24 98.2 kg (216 lb 7.9 oz)     /64 (BP Location: Right arm, Patient Position: Sitting)   Pulse 63   Temp 98 °F (36.7 °C) (Oral)   Resp 17   Ht 5' 11" (1.803 m)   Wt 97 kg (213 lb 13.5 oz)   SpO2 (!) 94%   BMI 29.83 kg/m²    274}  Laboratory: I have reviewed old labs below:    274}    Lab Results   Component Value Date    WBC 8.55 03/14/2024    HGB 14.0 03/14/2024    HCT 43.1 03/14/2024    MCV 89 03/14/2024     03/14/2024     (L) 10/09/2024    K 5.3 (H) 10/09/2024    CL 98 10/09/2024    CALCIUM 9.3 10/09/2024    PHOS 3.8 09/14/2021    CO2 30 (H) 10/09/2024     10/09/2024    BUN 23 10/09/2024    CREATININE 1.5 (H) 10/09/2024    CREATININE 1.5 (H) 10/09/2024    EGFRNORACEVR 48.9 (A) 10/09/2024    EGFRNORACEVR 48.9 (A) 10/09/2024    ANIONGAP 6 (L) 10/09/2024    PROT 6.7 10/09/2024    ALBUMIN 3.8 10/09/2024    BILITOT 0.3 10/09/2024    ALKPHOS 70 10/09/2024    ALT 7 " (L) 10/09/2024    AST 12 10/09/2024    CHOL 135 10/09/2024    TRIG 194 (H) 10/09/2024    HDL 32 (L) 10/09/2024    LDLCALC 64.2 10/09/2024    TSH 1.209 03/14/2024    PSA 2.6 01/17/2023    HGBA1C 5.9 (H) 10/09/2024      Lab reviewed by me: Particular labs of significance that I will monitor, workup, or treat to improve are mentioned below in diagnostic impression remarks.    Results  Laboratory Studies  Potassium was elevated. Protein in urine.       Imaging/EKG: I have reviewed the pertinent results and my findings are noted in remarks.  274}    CC:   Chief Complaint   Patient presents with    Follow-up    Hyperlipidemia    Hypertension    Diabetes        274}    History of Present Illness  The patient is a 74-year-old male who is here for follow-up.    He reports an improvement in his back pain, which he has been managing with gabapentin. However, he notes that the medication does not alleviate the pain that radiates down his left leg. He has also been using Advil for pain relief. He has previously undergone surgery for L4-L5 issues and has been living with chronic pain since then. Lidocaine has been beneficial in managing his pain.       Assessment/Plan  Umang Goldberg is a 74 y.o. male who presents to clinic with:  1. Healthcare maintenance    2. Hypertension associated with diabetes    3. Paroxysmal atrial fibrillation    4. Hyperlipidemia associated with type 2 diabetes mellitus    5. Back pain, unspecified back location, unspecified back pain laterality, unspecified chronicity    6. Chronic obstructive pulmonary disease, unspecified COPD type    7. Stage 3a chronic kidney disease    8. Nicotine dependence, cigarettes, with unspecified nicotine-induced disorders    9. Abnormal finding of blood chemistry, unspecified    10. Type 2 diabetes mellitus with other circulatory complications       274}    Assessment & Plan  1. Chronic Kidney Disease.  Elevated potassium levels were noted in his recent blood work, likely  due to excessive ibuprofen intake. He has been advised to avoid ibuprofen and use alternatives like Tylenol, lidocaine patches, topical Voltaren gel, and muscle relaxers. A recheck of blood work to monitor potassium and kidney function will be done in a week. Consider sglt2     2. Diabetes Mellitus.  His diabetes is well managed with a stable A1c. Current medications will be continued, and his condition will be monitored.    3. Hypertension.  His hypertension is stable. Current medications will be continued, and his condition will be monitored. If potassium levels remain high, a change in medication may be necessary.    4. Chronic Back Pain.  He is experiencing chronic back pain radiating down his left leg. Gabapentin and Lyrica were discussed, but he prefers to postpone these. Physical therapy was recommended, and his condition will be monitored. He uses lidocaine patches, which he finds helpful.    5. Health Maintenance.  Lung cancer screening was recommended. He will receive a flu shot.    Follow-up  Return in 6 months.   Hyperkalemia recheck avoid ns  Hypertension -stable continue current meds monitor no headache or chest pain f/u blood work   Diabetes with hyperglycemia-stable continue current medications monitor A1c recommend eye exam yearly.  Low glycemic index diet and monitor kidney function.     Nicotine dependence-needs improvement recommend smoking cessation offered medications patient wants to hold off on meds.   Assistance with smoking cessation was offered, including:  [x]  Medications  [x]  Counseling  []  Printed Information on Smoking Cessation  []  Referral to a Smoking Cessation Program    Patient was counseled regarding smoking for >10 minutes.    This note was generated with the assistance of ambient listening technology. Verbal consent was obtained by the patient and accompanying visitor(s) for the recording of patient appointment to facilitate this note. I attest to having reviewed and edited  the generated note for accuracy, though some syntax or spelling errors may persist. Please contact the author of this note for any clarification.      1. Healthcare maintenance    2. Hypertension associated with diabetes    3. Paroxysmal atrial fibrillation    4. Hyperlipidemia associated with type 2 diabetes mellitus    5. Back pain, unspecified back location, unspecified back pain laterality, unspecified chronicity    6. Chronic obstructive pulmonary disease, unspecified COPD type    7. Stage 3a chronic kidney disease  - Comprehensive Metabolic Panel; Future    8. Nicotine dependence, cigarettes, with unspecified nicotine-induced disorders  - Ambulatory referral/consult to Smoking Cessation Program; Future    9. Abnormal finding of blood chemistry, unspecified  - Comprehensive Metabolic Panel; Future    10. Type 2 diabetes mellitus with other circulatory complications      Larry Thompson MD   274}    If you are due for any health screening(s) below please notify me so we can arrange them to be ordered and scheduled. Most healthy patients at your age complete them, but you are free to accept or refuse.     If you can't do it, I'll definitely understand. If you can, I'd certainly appreciate it!   Tests to Keep You Healthy    Eye Exam: Met on 7/11/2024  Colon Cancer Screening: Met on 1/26/2024  Last Blood Pressure <= 139/89 (10/16/2024): Yes  Last HbA1c < 8 (10/09/2024): Yes  Tobacco Cessation: NO

## 2024-10-24 ENCOUNTER — LAB VISIT (OUTPATIENT)
Dept: LAB | Facility: HOSPITAL | Age: 74
End: 2024-10-24
Attending: STUDENT IN AN ORGANIZED HEALTH CARE EDUCATION/TRAINING PROGRAM
Payer: MEDICARE

## 2024-10-24 DIAGNOSIS — R79.9 ABNORMAL FINDING OF BLOOD CHEMISTRY, UNSPECIFIED: ICD-10-CM

## 2024-10-24 DIAGNOSIS — N18.31 STAGE 3A CHRONIC KIDNEY DISEASE: ICD-10-CM

## 2024-10-24 LAB
ALBUMIN SERPL BCP-MCNC: 4 G/DL (ref 3.5–5.2)
ALP SERPL-CCNC: 77 U/L (ref 40–150)
ALT SERPL W/O P-5'-P-CCNC: 9 U/L (ref 10–44)
ANION GAP SERPL CALC-SCNC: 11 MMOL/L (ref 8–16)
AST SERPL-CCNC: 13 U/L (ref 10–40)
BILIRUB SERPL-MCNC: 0.3 MG/DL (ref 0.1–1)
BUN SERPL-MCNC: 15 MG/DL (ref 8–23)
CALCIUM SERPL-MCNC: 10 MG/DL (ref 8.7–10.5)
CHLORIDE SERPL-SCNC: 94 MMOL/L (ref 95–110)
CO2 SERPL-SCNC: 28 MMOL/L (ref 23–29)
CREAT SERPL-MCNC: 1.4 MG/DL (ref 0.5–1.4)
EST. GFR  (NO RACE VARIABLE): 53.1 ML/MIN/1.73 M^2
GLUCOSE SERPL-MCNC: 84 MG/DL (ref 70–110)
POTASSIUM SERPL-SCNC: 5.2 MMOL/L (ref 3.5–5.1)
PROT SERPL-MCNC: 7.1 G/DL (ref 6–8.4)
SODIUM SERPL-SCNC: 133 MMOL/L (ref 136–145)

## 2024-10-24 PROCEDURE — 80053 COMPREHEN METABOLIC PANEL: CPT | Performed by: STUDENT IN AN ORGANIZED HEALTH CARE EDUCATION/TRAINING PROGRAM

## 2024-10-24 PROCEDURE — 36415 COLL VENOUS BLD VENIPUNCTURE: CPT | Mod: PO | Performed by: STUDENT IN AN ORGANIZED HEALTH CARE EDUCATION/TRAINING PROGRAM

## 2024-11-09 DIAGNOSIS — E11.21 WELL CONTROLLED TYPE 2 DIABETES MELLITUS WITH NEPHROPATHY: ICD-10-CM

## 2024-11-09 NOTE — TELEPHONE ENCOUNTER
No care due was identified.  [facility] Embedded Care Due Messages. Reference number: [id number].   11/09/2024 4:10:34 PM CST

## 2024-11-09 NOTE — TELEPHONE ENCOUNTER
No care due was identified.  [facility] Embedded Care Due Messages. Reference number: [id number].   11/09/2024 8:53:20 AM CST

## 2024-11-10 RX ORDER — METFORMIN HYDROCHLORIDE 1000 MG/1
1000 TABLET ORAL 2 TIMES DAILY WITH MEALS
Qty: 180 TABLET | Refills: 3 | Status: SHIPPED | OUTPATIENT
Start: 2024-11-10 | End: 2025-11-10

## 2024-11-10 RX ORDER — METFORMIN HYDROCHLORIDE 1000 MG/1
1000 TABLET ORAL 2 TIMES DAILY WITH MEALS
Qty: 180 TABLET | Refills: 1 | Status: SHIPPED | OUTPATIENT
Start: 2024-11-10

## 2024-11-10 NOTE — TELEPHONE ENCOUNTER
Refill Authorization Note     Refill Decision Note   Umang Goldberg  is requesting a refill authorization.  Brief Assessment and Rationale for Refill:  Approve     Medication Therapy Plan:       Medication Reconciliation Completed: No   Comments:     No Care Gaps recommended.     Note composed:12:39 AM 11/10/2024

## 2024-11-14 DIAGNOSIS — N52.9 ERECTILE DYSFUNCTION, UNSPECIFIED ERECTILE DYSFUNCTION TYPE: ICD-10-CM

## 2024-11-14 RX ORDER — SILDENAFIL CITRATE 20 MG/1
TABLET ORAL
Qty: 30 TABLET | Refills: 11 | Status: SHIPPED | OUTPATIENT
Start: 2024-11-14

## 2024-11-14 NOTE — TELEPHONE ENCOUNTER
Refill Routing Note   Medication(s) are not appropriate for processing by [facility] for the following reason(s):        Due for refill >6 months ago    [facility] action(s):  Defer               Appointments  past 12m or future 3m with PCP    Date Provider   Last Visit   10/16/2024 Larry Thompson MD   Next Visit   4/21/2025 Larry Thompson MD   ED visits in past 90 days: 0        Note composed:2:13 PM 11/14/2024

## 2024-11-14 NOTE — TELEPHONE ENCOUNTER
No care due was identified.  [facility] Embedded Care Due Messages. Reference number: [id number].   11/14/2024 11:34:02 AM CST

## 2024-12-10 LAB — FIT SCREENING: NEGATIVE

## 2025-01-23 RX ORDER — ALBUTEROL SULFATE 0.63 MG/3ML
0.63 SOLUTION RESPIRATORY (INHALATION) 4 TIMES DAILY PRN
Qty: 120 EACH | Refills: 5 | Status: SHIPPED | OUTPATIENT
Start: 2025-01-23 | End: 2025-01-27 | Stop reason: SDUPTHER

## 2025-01-23 NOTE — TELEPHONE ENCOUNTER
----- Message from [employer] sent at 1/21/2025 12:23 PM CST -----  Type:  RX Refill Request    Who Called:the patient  Refill or New Rx:refil  RX Name and Strength:albuterol (ACCUNEB) 0.63 mg/3 mL Nebu  How is the patient currently taking it? (ex. 1XDay):as rx'd  Is this a 30 day or 90 day RX:90  Preferred Pharmacy with phone number:  [facility] [address] [place], [place] - [address] [place] [address]  Phone: [phone] Fax: [phone]      Local or Mail Order:local/  Ordering Provider:same  Would the patient rather a call back or a response via [facility]? Call/  Best Call Back Number:[phone]   Additional Information: Thanks

## 2025-01-24 RX ORDER — ALBUTEROL SULFATE 0.63 MG/3ML
0.63 SOLUTION RESPIRATORY (INHALATION) 4 TIMES DAILY PRN
Qty: 120 EACH | Refills: 5 | OUTPATIENT
Start: 2025-01-24

## 2025-01-27 DIAGNOSIS — J44.9 CHRONIC OBSTRUCTIVE PULMONARY DISEASE, UNSPECIFIED COPD TYPE: Primary | ICD-10-CM

## 2025-01-27 DIAGNOSIS — E11.59 HYPERTENSION ASSOCIATED WITH DIABETES: ICD-10-CM

## 2025-01-27 DIAGNOSIS — I15.2 HYPERTENSION ASSOCIATED WITH DIABETES: ICD-10-CM

## 2025-01-27 NOTE — TELEPHONE ENCOUNTER
Care Due:                  Date            Visit Type   Department     Provider  --------------------------------------------------------------------------------                                EP -                              PRIMARY      [facility]  Last Visit: 10-      CARE ([facility])   GEOVANNA Thompson                              EP -                              PRIMARY      [facility]  Next Visit: 04-      CARE ([facility])   MEDICINE       Larry Thompson                                                            Last  Test          Frequency    Reason                     Performed    Due Date  --------------------------------------------------------------------------------    HBA1C.......  6 months...  metFORMIN................  10-   04-    [employer] Embedded Care Due Messages. Reference number: [id number].   1/27/2025 4:21:05 PM CST

## 2025-01-28 RX ORDER — LISINOPRIL 20 MG/1
20 TABLET ORAL
Qty: 90 TABLET | Refills: 2 | Status: SHIPPED | OUTPATIENT
Start: 2025-01-28

## 2025-01-28 RX ORDER — ALBUTEROL SULFATE 0.63 MG/3ML
0.63 SOLUTION RESPIRATORY (INHALATION) 4 TIMES DAILY PRN
Qty: 120 EACH | Refills: 2 | Status: SHIPPED | OUTPATIENT
Start: 2025-01-28

## 2025-01-28 NOTE — TELEPHONE ENCOUNTER
Refill Routing Note   Medication(s) are not appropriate for processing by [facility] for the following reason(s):        Required labs abnormal    [facility] action(s):  Defer   Requires labs : Yes             Appointments  past 12m or future 3m with PCP    Date Provider   Last Visit   10/16/2024 Larry Thompson MD   Next Visit   4/21/2025 Larry Thompson MD   ED visits in past 90 days: 0        Note composed:8:26 AM 01/28/2025

## 2025-01-30 DIAGNOSIS — J44.9 CHRONIC OBSTRUCTIVE PULMONARY DISEASE, UNSPECIFIED COPD TYPE: ICD-10-CM

## 2025-01-30 RX ORDER — ALBUTEROL SULFATE 90 UG/1
2 INHALANT RESPIRATORY (INHALATION) EVERY 6 HOURS PRN
Qty: 18 G | Refills: 11 | Status: SHIPPED | OUTPATIENT
Start: 2025-01-30

## 2025-02-24 DIAGNOSIS — Z00.00 ENCOUNTER FOR MEDICARE ANNUAL WELLNESS EXAM: ICD-10-CM

## 2025-03-26 ENCOUNTER — OFFICE VISIT (OUTPATIENT)
Dept: DERMATOLOGY | Facility: CLINIC | Age: 75
End: 2025-03-26
Payer: MEDICARE

## 2025-03-26 DIAGNOSIS — B07.9 VERRUCA VULGARIS: ICD-10-CM

## 2025-03-26 DIAGNOSIS — L82.1 SEBORRHEIC KERATOSIS: Primary | ICD-10-CM

## 2025-03-26 PROCEDURE — 1101F PT FALLS ASSESS-DOCD LE1/YR: CPT | Mod: CPTII,S$GLB,, | Performed by: STUDENT IN AN ORGANIZED HEALTH CARE EDUCATION/TRAINING PROGRAM

## 2025-03-26 PROCEDURE — 1160F RVW MEDS BY RX/DR IN RCRD: CPT | Mod: CPTII,S$GLB,, | Performed by: STUDENT IN AN ORGANIZED HEALTH CARE EDUCATION/TRAINING PROGRAM

## 2025-03-26 PROCEDURE — 3072F LOW RISK FOR RETINOPATHY: CPT | Mod: CPTII,S$GLB,, | Performed by: STUDENT IN AN ORGANIZED HEALTH CARE EDUCATION/TRAINING PROGRAM

## 2025-03-26 PROCEDURE — 3288F FALL RISK ASSESSMENT DOCD: CPT | Mod: CPTII,S$GLB,, | Performed by: STUDENT IN AN ORGANIZED HEALTH CARE EDUCATION/TRAINING PROGRAM

## 2025-03-26 PROCEDURE — 1159F MED LIST DOCD IN RCRD: CPT | Mod: CPTII,S$GLB,, | Performed by: STUDENT IN AN ORGANIZED HEALTH CARE EDUCATION/TRAINING PROGRAM

## 2025-03-26 PROCEDURE — 99202 OFFICE O/P NEW SF 15 MIN: CPT | Mod: 25,S$GLB,, | Performed by: STUDENT IN AN ORGANIZED HEALTH CARE EDUCATION/TRAINING PROGRAM

## 2025-03-26 PROCEDURE — 17110 DESTRUCTION B9 LES UP TO 14: CPT | Mod: S$GLB,,, | Performed by: STUDENT IN AN ORGANIZED HEALTH CARE EDUCATION/TRAINING PROGRAM

## 2025-03-26 PROCEDURE — 1126F AMNT PAIN NOTED NONE PRSNT: CPT | Mod: CPTII,S$GLB,, | Performed by: STUDENT IN AN ORGANIZED HEALTH CARE EDUCATION/TRAINING PROGRAM

## 2025-03-26 PROCEDURE — 4010F ACE/ARB THERAPY RXD/TAKEN: CPT | Mod: CPTII,S$GLB,, | Performed by: STUDENT IN AN ORGANIZED HEALTH CARE EDUCATION/TRAINING PROGRAM

## 2025-03-26 NOTE — PATIENT INSTRUCTIONS

## 2025-03-26 NOTE — PROGRESS NOTES
Subjective:      Patient ID:  Umang Goldberg is a 74 y.o. male who presents for   Chief Complaint   Patient presents with    Spot     back     New Patient    Patient is coming in today with complaints of a spot on his back that his PCP wanted to get check.     Derm Hx  Denies Phx NMSC  Denies Fhx MM      Review of Systems   Constitutional:  Negative for fever, chills and fatigue.   Respiratory:  Negative for cough and shortness of breath.    Gastrointestinal:  Negative for nausea, vomiting and diarrhea.   Skin:  Positive for wears hat. Negative for activity-related sunscreen use.   Hematologic/Lymphatic: Bruises/bleeds easily (Eliquis).       Objective:   Physical Exam   Constitutional: He appears well-developed and well-nourished.   Neurological: He is alert and oriented to person, place, and time.   Psychiatric: He has a normal mood and affect.   Skin:   Areas Examined (abnormalities noted in diagram):   Back Inspection Performed  LLE Inspection Performed            Diagram Legend     Erythematous scaling macule/papule c/w actinic keratosis       Vascular papule c/w angioma      Pigmented verrucoid papule/plaque c/w seborrheic keratosis      Yellow umbilicated papule c/w sebaceous hyperplasia      Irregularly shaped tan macule c/w lentigo     1-2 mm smooth white papules consistent with Milia      Movable subcutaneous cyst with punctum c/w epidermal inclusion cyst      Subcutaneous movable cyst c/w pilar cyst      Firm pink to brown papule c/w dermatofibroma      Pedunculated fleshy papule(s) c/w skin tag(s)      Evenly pigmented macule c/w junctional nevus     Mildly variegated pigmented, slightly irregular-bordered macule c/w mildly atypical nevus      Flesh colored to evenly pigmented papule c/w intradermal nevus       Pink pearly papule/plaque c/w basal cell carcinoma      Erythematous hyperkeratotic cursted plaque c/w SCC      Surgical scar with no sign of skin cancer recurrence      Open and closed comedones       Inflammatory papules and pustules      Verrucoid papule consistent consistent with wart     Erythematous eczematous patches and plaques     Dystrophic onycholytic nail with subungual debris c/w onychomycosis     Umbilicated papule    Erythematous-base heme-crusted tan verrucoid plaque consistent with inflamed seborrheic keratosis     Erythematous Silvery Scaling Plaque c/w Psoriasis     See annotation      Assessment / Plan:        Seborrheic keratosis  These are benign inherited growths without a malignant potential. Reassurance given to patient. No treatment is necessary.     Verruca vulgaris, back  -     Ambulatory referral/consult to Dermatology  Cryosurgery procedure note:    Verbal consent from the patient is obtained. Liquid nitrogen cryosurgery is applied to 1 verruca, as detailed in the physical exam, to produce a freeze injury. 2 consecutive freeze thaw cycles are applied to each verruca. The patient is aware that blisters (possibly blood blisters) may form.  If not resolved in 4 weeks needs to rtc- instructed to call  VV vs. SK on left leg, declines treatment, not bothersome.    Offered UBSE but patient declines today           No follow-ups on file.

## 2025-04-14 DIAGNOSIS — E11.69 HYPERLIPIDEMIA ASSOCIATED WITH TYPE 2 DIABETES MELLITUS: ICD-10-CM

## 2025-04-14 DIAGNOSIS — R60.0 BILATERAL LEG EDEMA: ICD-10-CM

## 2025-04-14 DIAGNOSIS — E78.5 HYPERLIPIDEMIA ASSOCIATED WITH TYPE 2 DIABETES MELLITUS: ICD-10-CM

## 2025-04-14 RX ORDER — FUROSEMIDE 20 MG/1
40 TABLET ORAL DAILY PRN
Qty: 180 TABLET | Refills: 3 | Status: SHIPPED | OUTPATIENT
Start: 2025-04-14

## 2025-04-14 RX ORDER — ATORVASTATIN CALCIUM 20 MG/1
20 TABLET, FILM COATED ORAL DAILY
Qty: 90 TABLET | Refills: 3 | Status: SHIPPED | OUTPATIENT
Start: 2025-04-14

## 2025-04-14 NOTE — TELEPHONE ENCOUNTER
Care Due:                  Date            Visit Type   Department     Provider  --------------------------------------------------------------------------------                                EP -                              PRIMARY      [facility]  Last Visit: 10-      CARE ([facility])   GEOVANNA Thompson                              EP -                              PRIMARY      [facility]  Next Visit: 04-      CARE ([facility])   MEDICINE       Larry Thompson                                                            Last  Test          Frequency    Reason                     Performed    Due Date  --------------------------------------------------------------------------------    HBA1C.......  6 months...  metFORMIN................  10-   04-    [employer] Embedded Care Due Messages. Reference number: [id number].   4/14/2025 2:50:13 PM CDT

## 2025-04-14 NOTE — TELEPHONE ENCOUNTER
No care due was identified.  [facility] Embedded Care Due Messages. Reference number: [id number].   4/14/2025 2:51:39 PM CDT

## 2025-04-15 NOTE — ASSESSMENT & PLAN NOTE
Labs and imaging reviewed from prior.  Has had mildly elevated plasma metanephrines (not 2-3 x ULN).  Did have urine spot metanephrine levels which were WNL.  Dexamethasone suppression testing did not show full suppression of cortisol to less than 1.8 (was 2.2) but no dexamethasone level obtained with that lab draw.  Aldosterone to renin ratios have been normal.  No concerning symptoms/      Plan:  - Repeat dexamethasone suppression testing along with dexamethasone level  - If abnormal DST then we will pursue salivary and urine cortisol testing  - Can continue yearly monitoring of chest CT for smoking history which will include adrenal glands (has remained stable compared to prior).     Please advise on refill. Scheduled with  for 8/4/25.

## 2025-04-21 ENCOUNTER — OFFICE VISIT (OUTPATIENT)
Dept: FAMILY MEDICINE | Facility: CLINIC | Age: 75
End: 2025-04-21
Payer: MEDICARE

## 2025-04-21 VITALS
DIASTOLIC BLOOD PRESSURE: 60 MMHG | BODY MASS INDEX: 29.02 KG/M2 | OXYGEN SATURATION: 94 % | TEMPERATURE: 99 F | SYSTOLIC BLOOD PRESSURE: 120 MMHG | WEIGHT: 207.25 LBS | HEART RATE: 62 BPM | HEIGHT: 71 IN | RESPIRATION RATE: 16 BRPM

## 2025-04-21 DIAGNOSIS — F17.219 NICOTINE DEPENDENCE, CIGARETTES, WITH UNSPECIFIED NICOTINE-INDUCED DISORDERS: ICD-10-CM

## 2025-04-21 DIAGNOSIS — I71.43 INFRARENAL ABDOMINAL AORTIC ANEURYSM (AAA) WITHOUT RUPTURE: ICD-10-CM

## 2025-04-21 DIAGNOSIS — E11.59 TYPE 2 DIABETES MELLITUS WITH OTHER CIRCULATORY COMPLICATIONS: ICD-10-CM

## 2025-04-21 DIAGNOSIS — R79.9 ABNORMAL FINDING OF BLOOD CHEMISTRY, UNSPECIFIED: ICD-10-CM

## 2025-04-21 DIAGNOSIS — Z00.00 HEALTHCARE MAINTENANCE: Primary | ICD-10-CM

## 2025-04-21 DIAGNOSIS — I48.0 PAROXYSMAL ATRIAL FIBRILLATION: ICD-10-CM

## 2025-04-21 DIAGNOSIS — R91.1 LUNG NODULE: ICD-10-CM

## 2025-04-21 DIAGNOSIS — E11.59 HYPERTENSION ASSOCIATED WITH DIABETES: ICD-10-CM

## 2025-04-21 DIAGNOSIS — I15.2 HYPERTENSION ASSOCIATED WITH DIABETES: ICD-10-CM

## 2025-04-21 DIAGNOSIS — J44.9 CHRONIC OBSTRUCTIVE PULMONARY DISEASE, UNSPECIFIED COPD TYPE: ICD-10-CM

## 2025-04-21 DIAGNOSIS — N18.31 STAGE 3A CHRONIC KIDNEY DISEASE: ICD-10-CM

## 2025-04-21 PROCEDURE — 99999 PR PBB SHADOW E&M-EST. PATIENT-LVL V: CPT | Mod: PBBFAC,,, | Performed by: STUDENT IN AN ORGANIZED HEALTH CARE EDUCATION/TRAINING PROGRAM

## 2025-04-21 NOTE — PROGRESS NOTES
[facility] Note    Subjective:    The HPI and pertinent ROS is included in the Diagnostic Impression Remarks section at the end of the note. Please see below for further details. Chief complaint is at end of note.     Umang is a pleasant intelligent patient who is here for evaluation.     Modified Medications    No medications on file       Data reviewed 274}  Previous medical records reviewed and summarized in plan section at end of note.      If you are due for any health screening(s) below please notify me so we can arrange them to be ordered and scheduled. Most healthy patients at your age complete them, but you are free to accept or refuse. If you can't do it, I'll definitely understand. If you can, I'd certainly appreciate it!     Tests to Keep You Healthy    Eye Exam: Met on 7/11/2024  Colon Cancer Screening: DUE  Last Blood Pressure <= 139/89 (4/21/2025): Yes  Last HbA1c < 8 (12/02/2024): Yes  Tobacco Cessation: NO      The following portions of the patient's history were reviewed and updated as appropriate: allergies, current medications, past family history, past medical history, past social history, past surgical history and problem list.    He  has a past medical history of A-fib (1 year), Diabetes mellitus, and Hypertension.  He  has a past surgical history that includes Back surgery; Hernia repair; and Eye surgery.    He  reports that he has been smoking vaping with nicotine and cigarettes. He has been exposed to tobacco smoke. He has never used smokeless tobacco. He reports that he does not currently use alcohol. He reports current drug use. Drug: Hydrocodone.  He family history includes Heart disease in his brother, brother, and mother; Hypertension in his brother; Psoriasis in his daughter; Skin cancer in his father.    Review of patient's allergies indicates:   Allergen Reactions    Contrast media Hives and Itching     Pt can have IV contrast with premedication       Tobacco Use:  "High Risk (4/21/2025)    Patient History     Smoking Tobacco Use: Every Day     Smokeless Tobacco Use: Never     Passive Exposure: Current     Physical Examination  Physical Exam       General appearance: alert, cooperative, no distress  Neck: no thyromegaly, no neck stiffness  Lungs: clear to auscultation, no wheezes, rales or rhonchi, symmetric air entry  Heart: normal rate, regular rhythm, normal S1, S2, no murmurs, rubs, clicks or gallops no pulse deficit no pulsatile mass   Abdomen: soft, nontender, nondistended, no rigidity, rebound, or guarding.   Back: no point tenderness over spine  Extremities: peripheral pulses normal, no unilateral leg swelling or calf tenderness   Neurological:alert, oriented, normal speech, no new focal findings or movement disorder noted from baseline      BP Readings from Last 3 Encounters:   04/21/25 120/60   10/16/24 134/64   09/09/24 110/62     Wt Readings from Last 3 Encounters:   04/21/25 94 kg (207 lb 3.7 oz)   10/16/24 97 kg (213 lb 13.5 oz)   09/24/24 98.4 kg (216 lb 14.9 oz)     /60 (BP Location: Right arm, Patient Position: Sitting)   Pulse 62   Temp 98.8 °F (37.1 °C) (Oral)   Resp 16   Ht 5' 11" (1.803 m)   Wt 94 kg (207 lb 3.7 oz)   SpO2 (!) 94%   BMI 28.90 kg/m²    274}  Laboratory: I have reviewed old labs below:    274}    Lab Results   Component Value Date    WBC 8.55 03/14/2024    HGB 14.0 03/14/2024    HCT 43.1 03/14/2024    MCV 89 03/14/2024     03/14/2024     (L) 10/24/2024    K 5.2 (H) 10/24/2024    CL 94 (L) 10/24/2024    CALCIUM 10.0 10/24/2024    PHOS 3.8 09/14/2021    CO2 28 10/24/2024    BUN 15 10/24/2024    CREATININE 1.4 10/24/2024    EGFRNORACEVR 53.1 (A) 10/24/2024    ALBUMIN 4.0 10/24/2024    BILITOT 0.3 10/24/2024    ALKPHOS 77 10/24/2024    ALT 9 (L) 10/24/2024    AST 13 10/24/2024    CHOL 135 10/09/2024    TRIG 194 (H) 10/09/2024    HDL 32 (L) 10/09/2024    TSH 1.209 03/14/2024    PSA 2.6 01/17/2023    HGBA1C 5.9 (H) " 10/09/2024      Lab reviewed by me: Particular labs of significance that I will monitor, workup, or treat to improve are mentioned below in diagnostic impression remarks.    Results  Labs   - A1c: 12/02/2024, 6.7    Imaging   - Colonoscopy: 12/10/2024, Normal       Imaging/EKG: I have reviewed the pertinent results and my findings are noted in remarks.  274}    CC:   Chief Complaint   Patient presents with    Follow-up    Hypertension    Diabetes        274}    History of Present Illness  The patient is a 75-year-old male who presents for evaluation of back pain, abdominal aortic aneurysm, and lung nodule.    Severe back pain radiates down his legs, rendering him unable to sit or stand for extended periods. Gabapentin was previously tried but was ineffective. Hydrocodone was used for pain management but induced nausea and vomiting. Lyrica was discussed but not recalled if tried. A TENS unit is used for pain management, providing some relief. Lidocaine patches and roll-on are applied, which are beneficial.    [facility]. Potassium levels were good, but A1c was slightly elevated at 6.7, attributed to diet during Thanksgiving. A blood pressure cuff is used at home, checking blood pressure three times a day.    An abdominal aortic aneurysm is present, and he is under the care of Dr. Reyes for this condition.    A lung nodule is present, and he is under the care of Dr. Reyes for this condition.    A colonoscopy was performed on 12/10/2024, which was normal.       Assessment/Plan  Umang Goldberg is a 75 y.o. male who presents to clinic with:  1. Healthcare maintenance    2. Type 2 diabetes mellitus with other circulatory complications    3. Hypertension associated with diabetes    4. Paroxysmal atrial fibrillation    5. Abnormal finding of blood chemistry, unspecified    6. Stage 3a chronic kidney disease    7. Chronic obstructive pulmonary disease, unspecified COPD type    8. Infrarenal  abdominal aortic aneurysm (AAA) without rupture    9. Lung nodule    10. Nicotine dependence, cigarettes, with unspecified nicotine-induced disorders       274}    Assessment & Plan  1. Chronic back pain.  - Persistent back pain radiates down the legs.  - Gabapentin previously tried without success; uses TENS unit and lidocaine patches for temporary relief.  - Lyrica discussed but will be held off for now; no tearing, ripping pain, no pulse deficit.  - Dull pain reported.    2. Abdominal aortic aneurysm.  - Known abdominal aortic aneurysm.  - Repeat CT scan will be ordered to monitor for any growth.  - Blood pressure control is essential.  - No tearing, ripping pain reported.    3. Lung nodule.  - Lung nodule requires monitoring.  - Repeat CT scan of the chest will be ordered to ensure there are no changes.  - Previous scans reviewed by Dr. Reyes with no significant findings.  - Coordination with Dr. Reyes for follow-up.    4. Health maintenance.  - Blood pressure well-controlled at 120/60 mmHg.  - Due for a stool kit in 12/2025.  - Blood work, including an A1c test, will be conducted on the same day as the CT scans.  - Potassium levels discussed; dietary intake reviewed.      Diabetes with hyperglycemia-stable continue current medications monitor A1c recommend eye exam yearly.  Low glycemic index diet and monitor kidney function.   Hypertension -stable continue current meds monitor no headache or chest pain f/u blood work   AFib-stable no recent flares continue current medications monitor  The patient's chronic kidney disease was monitored, evaluated, addressed and/or treated. Recommend to avoid NSAIDs and renally dose medications. ACE/ARB inhibitor if indicated and optimize blood pressure and A1c to goal.   COPD-stable continue current inhalers no recent flares    Patient has a known AAA and back pain. Although back pain can suggest possible expansion or rupture, I do not believe patient's AAA is symptomatic. The  patient does not have a pulsatile abdominal mass, tearing or ripping pain radiating to the back, no pulse deficit, or unstable vitals. I did not appreciate a new neuro deficit. Situation discussed with patient. They state they understand and after shared decision making wish to proceed with the current treatment plan.      Nicotine dependence-needs improvement recommend smoking cessation offered medications patient wants to hold off on meds.   Assistance with smoking cessation was offered, including:  [x]  Medications  [x]  Counseling  []  Printed Information on Smoking Cessation  []  Referral to a Smoking Cessation Program    Patient was counseled regarding smoking for >10 minutes.    At this point in time the patient is considered a low risk as determined by his history, physical, and the absence of red flags. I have a low suspicion of cord compression since he does not have saddle anesthesia, bowel or bladder incontinence,  weakness, or numbness in his arms or legs. Infection is low on my differential since he denies fever, chills, night sweats, IV drug use, dysuria, flank pain, and recent surgery. I did not appreciate bony tenderness, CVA tenderness, or an infectious source. My suspicion for cancer is low since he did not endorse fever, night sweats, or unintentional weight loss. Aortic dissection is low on the differential since he denies a ripping or tearing sensation chest pain, chest pain that radiates to the back, and sudden severe abdominal pain. On my exam there was no pulse deficit or pulsatile abdominal mass. Based on the history and examination, I feel that the likelihood of a high risk condition requiring emergent imaging is so low that no further testing in this regard is warranted at this point in time.     We discussed at length the warning symptoms in order for the patient to return to clinic and/or present to the ED if unable to reach the clinic within a timely manner including but not limited  to: increased pain, change in gait, change in sensation around the rectum or perineum, bowel or bladder issues/incontinent, urinary retention, and fever. Via teach back mechanism, the patient voiced understanding of the aforementioned recommendations/instructions.      This note was generated with the assistance of ambient listening technology. Verbal consent was obtained by the patient and accompanying visitor(s) for the recording of patient appointment to facilitate this note. I attest to having reviewed and edited the generated note for accuracy, though some syntax or spelling errors may persist. Please contact the author of this note for any clarification.      1. Healthcare maintenance    2. Type 2 diabetes mellitus with other circulatory complications  - Diabetic Eye Screening Photo; Future    3. Hypertension associated with diabetes    4. Paroxysmal atrial fibrillation    5. Abnormal finding of blood chemistry, unspecified  - CBC Auto Differential; Future  - Comprehensive Metabolic Panel; Future  - Hemoglobin A1C; Future    6. Stage 3a chronic kidney disease    7. Chronic obstructive pulmonary disease, unspecified COPD type    8. Infrarenal abdominal aortic aneurysm (AAA) without rupture  - CT Chest Abdomen Pelvis Without Contrast (XPD); Future    9. Lung nodule  - CT Chest Abdomen Pelvis Without Contrast (XPD); Future    10. Nicotine dependence, cigarettes, with unspecified nicotine-induced disorders  - Ambulatory referral/consult to Smoking Cessation Program; Future      Larry Thompson MD   274}    If you are due for any health screening(s) below please notify me so we can arrange them to be ordered and scheduled. Most healthy patients at your age complete them, but you are free to accept or refuse.     If you can't do it, I'll definitely understand. If you can, I'd certainly appreciate it!   Tests to Keep You Healthy    Eye Exam: Met on 7/11/2024  Colon Cancer Screening: DUE  Last Blood Pressure <= 139/89  (4/21/2025): Yes  Last HbA1c < 8 (12/02/2024): Yes  Tobacco Cessation: NO

## 2025-04-24 ENCOUNTER — PATIENT OUTREACH (OUTPATIENT)
Dept: ADMINISTRATIVE | Facility: HOSPITAL | Age: 75
End: 2025-04-24
Payer: MEDICARE

## 2025-04-24 ENCOUNTER — RESULTS FOLLOW-UP (OUTPATIENT)
Dept: FAMILY MEDICINE | Facility: CLINIC | Age: 75
End: 2025-04-24

## 2025-04-24 NOTE — PROGRESS NOTES
Population Health Chart Review & Patient Outreach Details      Updates Requested / Reviewed:      Updated Care Coordination Note, Care Everywhere, , and Immunizations Reconciliation Completed or Queried: [facility] Topics Overdue:      [facility] Score: 0     Patient is not due for any topics at this time.    Shingles/Zoster Vaccine  RSV Vaccine                  Health Maintenance Topic(s) Outreach Outcomes & Actions Taken:    Colorectal Cancer Screening - Outreach Outcomes & Actions Taken  : External Records Uploaded, Care Team Updated, & History Updated if Applicable

## 2025-04-28 ENCOUNTER — PATIENT MESSAGE (OUTPATIENT)
Dept: URGENT CARE | Facility: CLINIC | Age: 75
End: 2025-04-28
Payer: MEDICARE

## 2025-04-28 ENCOUNTER — HOSPITAL ENCOUNTER (OUTPATIENT)
Dept: RADIOLOGY | Facility: HOSPITAL | Age: 75
Discharge: HOME | End: 2025-04-28
Attending: STUDENT IN AN ORGANIZED HEALTH CARE EDUCATION/TRAINING PROGRAM
Payer: MEDICARE

## 2025-04-28 ENCOUNTER — RESULTS FOLLOW-UP (OUTPATIENT)
Dept: FAMILY MEDICINE | Facility: CLINIC | Age: 75
End: 2025-04-28

## 2025-04-28 DIAGNOSIS — I71.43 INFRARENAL ABDOMINAL AORTIC ANEURYSM (AAA) WITHOUT RUPTURE: ICD-10-CM

## 2025-04-28 DIAGNOSIS — R91.1 LUNG NODULE: ICD-10-CM

## 2025-04-28 DIAGNOSIS — E87.5 HYPERKALEMIA: Primary | ICD-10-CM

## 2025-04-28 PROCEDURE — 74176 CT ABD & PELVIS W/O CONTRAST: CPT | Mod: 26,,, | Performed by: RADIOLOGY

## 2025-04-28 PROCEDURE — 74176 CT ABD & PELVIS W/O CONTRAST: CPT | Mod: TC

## 2025-04-28 PROCEDURE — 71250 CT THORAX DX C-: CPT | Mod: 26,,, | Performed by: RADIOLOGY

## 2025-04-29 ENCOUNTER — PATIENT OUTREACH (OUTPATIENT)
Dept: ADMINISTRATIVE | Facility: HOSPITAL | Age: 75
End: 2025-04-29
Payer: MEDICARE

## 2025-04-29 ENCOUNTER — TELEPHONE (OUTPATIENT)
Dept: FAMILY MEDICINE | Facility: CLINIC | Age: 75
End: 2025-04-29
Payer: MEDICARE

## 2025-04-29 NOTE — TELEPHONE ENCOUNTER
----- Message from Eri sent at 4/29/2025 11:49 AM CDT -----  Regarding: ADALID BROOKE [[id number]]  Type:  Patient Returning Call  Who Called: ADALID BROOKE [[id number]] Who Left Message for Patient: Jessica  Does the patient know what this is regarding?:rescheduling (patient not sure)   Would the patient rather a call back or a response via [facility]? Call back   Best Call Back Number:[phone]  Additional Information:

## 2025-04-29 NOTE — TELEPHONE ENCOUNTER
Pt informed it's okay to take the Valtessa and have his lab rechecked on May 8th; pt verbalized understanding

## 2025-04-29 NOTE — TELEPHONE ENCOUNTER
----- Message from Lina sent at 4/29/2025  1:29 PM CDT -----  Contact: Patient  Type:  Needs Medical AdviceWho Called:   PatientPharmacy name and phone #:    [id number] - [place], [place] [address] [place] [address]Cuupf: [phone] Fax: [phone] the patient rather a call back or a response via [facility]?  Call [facility] Call Back Number:   [phone] Information:   States he would like to speak with someone about the sodium zirconium cyclosilicate (LOKELMA) 5 gram packet prescription Dr Thompson called in yesterday - states he would like to know if he can use medication he has at home instead - states medication at home is veltassa - please call - thank you

## 2025-05-08 ENCOUNTER — LAB VISIT (OUTPATIENT)
Dept: LAB | Facility: HOSPITAL | Age: 75
End: 2025-05-08
Attending: STUDENT IN AN ORGANIZED HEALTH CARE EDUCATION/TRAINING PROGRAM
Payer: MEDICARE

## 2025-05-08 DIAGNOSIS — E87.5 HYPERKALEMIA: ICD-10-CM

## 2025-05-08 LAB
ANION GAP (OHS): 7 MMOL/L (ref 8–16)
BUN SERPL-MCNC: 15 MG/DL (ref 8–23)
CALCIUM SERPL-MCNC: 9.4 MG/DL (ref 8.7–10.5)
CHLORIDE SERPL-SCNC: 98 MMOL/L (ref 95–110)
CO2 SERPL-SCNC: 30 MMOL/L (ref 23–29)
CREAT SERPL-MCNC: 1.2 MG/DL (ref 0.5–1.4)
GFR SERPLBLD CREATININE-BSD FMLA CKD-EPI: >60 ML/MIN/1.73/M2
GLUCOSE SERPL-MCNC: 110 MG/DL (ref 70–110)
POTASSIUM SERPL-SCNC: 4.8 MMOL/L (ref 3.5–5.1)
SODIUM SERPL-SCNC: 135 MMOL/L (ref 136–145)

## 2025-05-08 PROCEDURE — 82565 ASSAY OF CREATININE: CPT

## 2025-05-08 PROCEDURE — 36415 COLL VENOUS BLD VENIPUNCTURE: CPT | Mod: PO

## 2025-05-12 ENCOUNTER — RESULTS FOLLOW-UP (OUTPATIENT)
Dept: FAMILY MEDICINE | Facility: CLINIC | Age: 75
End: 2025-05-12